# Patient Record
Sex: FEMALE | Race: WHITE | NOT HISPANIC OR LATINO | Employment: FULL TIME | URBAN - METROPOLITAN AREA
[De-identification: names, ages, dates, MRNs, and addresses within clinical notes are randomized per-mention and may not be internally consistent; named-entity substitution may affect disease eponyms.]

---

## 2017-02-14 ENCOUNTER — CONVERSION ENCOUNTER (OUTPATIENT)
Dept: GENERAL RADIOLOGY | Facility: HOSPITAL | Age: 62
End: 2017-02-14

## 2018-06-25 ENCOUNTER — OFFICE VISIT CONVERTED (OUTPATIENT)
Dept: INTERNAL MEDICINE | Facility: CLINIC | Age: 63
End: 2018-06-25
Attending: INTERNAL MEDICINE

## 2018-07-25 ENCOUNTER — OFFICE VISIT CONVERTED (OUTPATIENT)
Dept: INTERNAL MEDICINE | Facility: CLINIC | Age: 63
End: 2018-07-25
Attending: INTERNAL MEDICINE

## 2019-04-24 ENCOUNTER — HOSPITAL ENCOUNTER (OUTPATIENT)
Dept: URGENT CARE | Facility: CLINIC | Age: 64
Discharge: HOME | End: 2019-04-24
Attending: FAMILY MEDICINE

## 2019-05-30 ENCOUNTER — OFFICE VISIT CONVERTED (OUTPATIENT)
Dept: INTERNAL MEDICINE | Facility: CLINIC | Age: 64
End: 2019-05-30
Attending: INTERNAL MEDICINE

## 2019-06-03 ENCOUNTER — HOSPITAL ENCOUNTER (OUTPATIENT)
Dept: GENERAL RADIOLOGY | Facility: HOSPITAL | Age: 64
Discharge: HOME | End: 2019-06-03
Attending: INTERNAL MEDICINE

## 2019-06-03 LAB
CREAT BLD-MCNC: 1 MG/DL (ref 0.6–1.4)
GFR SERPLBLD BASED ON 1.73 SQ M-ARVRAT: >60 ML/MIN/{1.73_M2}

## 2019-07-16 ENCOUNTER — HOSPITAL ENCOUNTER (OUTPATIENT)
Dept: OTHER | Facility: HOSPITAL | Age: 64
Discharge: HOME | End: 2019-07-16
Attending: INTERNAL MEDICINE

## 2019-07-16 ENCOUNTER — OFFICE VISIT CONVERTED (OUTPATIENT)
Dept: INTERNAL MEDICINE | Facility: CLINIC | Age: 64
End: 2019-07-16
Attending: INTERNAL MEDICINE

## 2019-07-16 ENCOUNTER — CONVERSION ENCOUNTER (OUTPATIENT)
Dept: INTERNAL MEDICINE | Facility: CLINIC | Age: 64
End: 2019-07-16

## 2019-07-16 ENCOUNTER — CONVERSION ENCOUNTER (OUTPATIENT)
Dept: GASTROENTEROLOGY | Facility: CLINIC | Age: 64
End: 2019-07-16

## 2019-07-16 ENCOUNTER — OFFICE VISIT CONVERTED (OUTPATIENT)
Dept: GASTROENTEROLOGY | Facility: CLINIC | Age: 64
End: 2019-07-16
Attending: INTERNAL MEDICINE

## 2019-07-16 LAB
ALBUMIN SERPL-MCNC: 3.9 G/DL (ref 3.5–5)
ALBUMIN/GLOB SERPL: 1.1 {RATIO} (ref 1.4–2.6)
ALP SERPL-CCNC: 81 U/L (ref 43–160)
ALT SERPL-CCNC: 19 U/L (ref 10–40)
ANION GAP SERPL CALC-SCNC: 19 MMOL/L (ref 8–19)
AST SERPL-CCNC: 23 U/L (ref 15–50)
BASOPHILS # BLD AUTO: 0.03 10*3/UL (ref 0–0.2)
BASOPHILS NFR BLD AUTO: 0.5 % (ref 0–3)
BILIRUB SERPL-MCNC: 0.15 MG/DL (ref 0.2–1.3)
BUN SERPL-MCNC: 20 MG/DL (ref 5–25)
BUN/CREAT SERPL: 24 {RATIO} (ref 6–20)
CALCIUM SERPL-MCNC: 9.9 MG/DL (ref 8.7–10.4)
CHLORIDE SERPL-SCNC: 100 MMOL/L (ref 99–111)
CHOLEST SERPL-MCNC: 131 MG/DL (ref 107–200)
CHOLEST/HDLC SERPL: 2.8 {RATIO} (ref 3–6)
CONV ABS IMM GRAN: 0.03 10*3/UL (ref 0–0.2)
CONV CO2: 26 MMOL/L (ref 22–32)
CONV IMMATURE GRAN: 0.5 % (ref 0–1.8)
CONV TOTAL PROTEIN: 7.5 G/DL (ref 6.3–8.2)
CREAT UR-MCNC: 0.84 MG/DL (ref 0.5–0.9)
DEPRECATED RDW RBC AUTO: 49.4 FL (ref 36.4–46.3)
EOSINOPHIL # BLD AUTO: 0.21 10*3/UL (ref 0–0.7)
EOSINOPHIL # BLD AUTO: 3.5 % (ref 0–7)
ERYTHROCYTE [DISTWIDTH] IN BLOOD BY AUTOMATED COUNT: 15.8 % (ref 11.7–14.4)
GFR SERPLBLD BASED ON 1.73 SQ M-ARVRAT: >60 ML/MIN/{1.73_M2}
GLOBULIN UR ELPH-MCNC: 3.6 G/DL (ref 2–3.5)
GLUCOSE SERPL-MCNC: 63 MG/DL (ref 65–99)
HBA1C MFR BLD: 11.5 G/DL (ref 12–16)
HCT VFR BLD AUTO: 37.2 % (ref 37–47)
HDLC SERPL-MCNC: 47 MG/DL (ref 40–60)
LDLC SERPL CALC-MCNC: 55 MG/DL (ref 70–100)
LYMPHOCYTES # BLD AUTO: 2.43 10*3/UL (ref 1–5)
MCH RBC QN AUTO: 26.3 PG (ref 27–31)
MCHC RBC AUTO-ENTMCNC: 30.9 G/DL (ref 33–37)
MCV RBC AUTO: 85.1 FL (ref 81–99)
MONOCYTES # BLD AUTO: 0.5 10*3/UL (ref 0.2–1.2)
MONOCYTES NFR BLD AUTO: 8.2 % (ref 3–10)
NEUTROPHILS # BLD AUTO: 2.88 10*3/UL (ref 2–8)
NEUTROPHILS NFR BLD AUTO: 47.3 % (ref 30–85)
NRBC CBCN: 0 % (ref 0–0.7)
OSMOLALITY SERPL CALC.SUM OF ELEC: 291 MOSM/KG (ref 273–304)
PLATELET # BLD AUTO: 684 10*3/UL (ref 130–400)
PMV BLD AUTO: 9.3 FL (ref 9.4–12.3)
POTASSIUM SERPL-SCNC: 4.6 MMOL/L (ref 3.5–5.3)
RBC # BLD AUTO: 4.37 10*6/UL (ref 4.2–5.4)
SODIUM SERPL-SCNC: 140 MMOL/L (ref 135–147)
TRIGL SERPL-MCNC: 144 MG/DL (ref 40–150)
VARIANT LYMPHS NFR BLD MANUAL: 40 % (ref 20–45)
VLDLC SERPL-MCNC: 29 MG/DL (ref 5–37)
WBC # BLD AUTO: 6.08 10*3/UL (ref 4.8–10.8)

## 2019-09-03 ENCOUNTER — HOSPITAL ENCOUNTER (OUTPATIENT)
Dept: GENERAL RADIOLOGY | Facility: HOSPITAL | Age: 64
Discharge: HOME | End: 2019-09-03
Attending: INTERNAL MEDICINE

## 2019-09-09 ENCOUNTER — HOSPITAL ENCOUNTER (OUTPATIENT)
Dept: GASTROENTEROLOGY | Facility: HOSPITAL | Age: 64
Setting detail: HOSPITAL OUTPATIENT SURGERY
Discharge: HOME | End: 2019-09-09
Attending: INTERNAL MEDICINE

## 2019-09-30 ENCOUNTER — HOSPITAL ENCOUNTER (OUTPATIENT)
Dept: GENERAL RADIOLOGY | Facility: HOSPITAL | Age: 64
Discharge: HOME | End: 2019-09-30
Attending: INTERNAL MEDICINE

## 2020-03-08 ENCOUNTER — HOSPITAL ENCOUNTER (OUTPATIENT)
Dept: URGENT CARE | Facility: CLINIC | Age: 65
Discharge: HOME | End: 2020-03-08

## 2020-03-19 ENCOUNTER — OFFICE VISIT CONVERTED (OUTPATIENT)
Dept: INTERNAL MEDICINE | Facility: CLINIC | Age: 65
End: 2020-03-19
Attending: INTERNAL MEDICINE

## 2020-03-19 ENCOUNTER — HOSPITAL ENCOUNTER (OUTPATIENT)
Dept: OTHER | Facility: HOSPITAL | Age: 65
Discharge: HOME | End: 2020-03-19
Attending: INTERNAL MEDICINE

## 2020-03-19 LAB
ALBUMIN SERPL-MCNC: 3.7 G/DL (ref 3.5–5)
ALBUMIN/GLOB SERPL: 1 {RATIO} (ref 1.4–2.6)
ALP SERPL-CCNC: 75 U/L (ref 43–160)
ALT SERPL-CCNC: 14 U/L (ref 10–40)
ANION GAP SERPL CALC-SCNC: 20 MMOL/L (ref 8–19)
AST SERPL-CCNC: 21 U/L (ref 15–50)
BASOPHILS # BLD AUTO: 0.04 10*3/UL (ref 0–0.2)
BASOPHILS NFR BLD AUTO: 0.3 % (ref 0–3)
BILIRUB SERPL-MCNC: 0.24 MG/DL (ref 0.2–1.3)
BUN SERPL-MCNC: 11 MG/DL (ref 5–25)
BUN/CREAT SERPL: 15 {RATIO} (ref 6–20)
CALCIUM SERPL-MCNC: 10 MG/DL (ref 8.7–10.4)
CHLORIDE SERPL-SCNC: 100 MMOL/L (ref 99–111)
CONV ABS IMM GRAN: 0.07 10*3/UL (ref 0–0.2)
CONV CO2: 22 MMOL/L (ref 22–32)
CONV IMMATURE GRAN: 0.6 % (ref 0–1.8)
CONV TOTAL PROTEIN: 7.5 G/DL (ref 6.3–8.2)
CREAT UR-MCNC: 0.71 MG/DL (ref 0.5–0.9)
DEPRECATED RDW RBC AUTO: 45.7 FL (ref 36.4–46.3)
EOSINOPHIL # BLD AUTO: 0.08 10*3/UL (ref 0–0.7)
EOSINOPHIL # BLD AUTO: 0.6 % (ref 0–7)
ERYTHROCYTE [DISTWIDTH] IN BLOOD BY AUTOMATED COUNT: 14.6 % (ref 11.7–14.4)
GFR SERPLBLD BASED ON 1.73 SQ M-ARVRAT: >60 ML/MIN/{1.73_M2}
GLOBULIN UR ELPH-MCNC: 3.8 G/DL (ref 2–3.5)
GLUCOSE SERPL-MCNC: 83 MG/DL (ref 65–99)
HCT VFR BLD AUTO: 35.1 % (ref 37–47)
HGB BLD-MCNC: 11 G/DL (ref 12–16)
LYMPHOCYTES # BLD AUTO: 1.46 10*3/UL (ref 1–5)
LYMPHOCYTES NFR BLD AUTO: 11.6 % (ref 20–45)
MCH RBC QN AUTO: 27 PG (ref 27–31)
MCHC RBC AUTO-ENTMCNC: 31.3 G/DL (ref 33–37)
MCV RBC AUTO: 86 FL (ref 81–99)
MONOCYTES # BLD AUTO: 0.62 10*3/UL (ref 0.2–1.2)
MONOCYTES NFR BLD AUTO: 4.9 % (ref 3–10)
NEUTROPHILS # BLD AUTO: 10.27 10*3/UL (ref 2–8)
NEUTROPHILS NFR BLD AUTO: 82 % (ref 30–85)
NRBC CBCN: 0 % (ref 0–0.7)
OSMOLALITY SERPL CALC.SUM OF ELEC: 285 MOSM/KG (ref 273–304)
PLATELET # BLD AUTO: 671 10*3/UL (ref 130–400)
PMV BLD AUTO: 9.7 FL (ref 9.4–12.3)
POTASSIUM SERPL-SCNC: 3.9 MMOL/L (ref 3.5–5.3)
RBC # BLD AUTO: 4.08 10*6/UL (ref 4.2–5.4)
SODIUM SERPL-SCNC: 138 MMOL/L (ref 135–147)
T4 FREE SERPL-MCNC: 1.4 NG/DL (ref 0.9–1.8)
TSH SERPL-ACNC: 2.04 M[IU]/L (ref 0.27–4.2)
WBC # BLD AUTO: 12.54 10*3/UL (ref 4.8–10.8)

## 2020-03-30 ENCOUNTER — TELEMEDICINE CONVERTED (OUTPATIENT)
Dept: INTERNAL MEDICINE | Facility: CLINIC | Age: 65
End: 2020-03-30
Attending: PHYSICIAN ASSISTANT

## 2020-03-30 ENCOUNTER — HOSPITAL ENCOUNTER (OUTPATIENT)
Dept: OTHER | Facility: HOSPITAL | Age: 65
Discharge: HOME | End: 2020-03-30
Attending: PHYSICIAN ASSISTANT

## 2020-03-30 LAB
APPEARANCE UR: ABNORMAL
BILIRUB UR QL: NEGATIVE
COLOR UR: YELLOW
CONV BACTERIA: NEGATIVE
CONV COLLECTION SOURCE (UA): ABNORMAL
CONV CRYSTALS: ABNORMAL /[HPF]
CONV HYALINE CASTS IN URINE MICRO: ABNORMAL /[LPF]
CONV UROBILINOGEN IN URINE BY AUTOMATED TEST STRIP: 0.2 {EHRLICHU}/DL (ref 0.1–1)
GLUCOSE UR QL: NEGATIVE MG/DL
HGB UR QL STRIP: ABNORMAL
KETONES UR QL STRIP: NEGATIVE MG/DL
LEUKOCYTE ESTERASE UR QL STRIP: ABNORMAL
NITRITE UR QL STRIP: NEGATIVE
PH UR STRIP.AUTO: 5 [PH] (ref 5–8)
PROT UR QL: ABNORMAL MG/DL
RBC #/AREA URNS HPF: ABNORMAL /[HPF]
SP GR UR: 1.02 (ref 1–1.03)
SQUAMOUS SPT QL MICRO: ABNORMAL /[HPF]
WBC #/AREA URNS HPF: ABNORMAL /[HPF]

## 2020-04-01 ENCOUNTER — HOSPITAL ENCOUNTER (OUTPATIENT)
Dept: GENERAL RADIOLOGY | Facility: HOSPITAL | Age: 65
Discharge: HOME | End: 2020-04-01
Attending: INTERNAL MEDICINE

## 2020-04-01 LAB
BACTERIA UR CULT: NORMAL
CREAT BLD-MCNC: 1.1 MG/DL (ref 0.6–1.4)
GFR SERPLBLD BASED ON 1.73 SQ M-ARVRAT: 54 ML/MIN/{1.73_M2}

## 2020-04-08 ENCOUNTER — OFFICE VISIT CONVERTED (OUTPATIENT)
Dept: UROLOGY | Facility: CLINIC | Age: 65
End: 2020-04-08
Attending: SURGERY

## 2020-04-17 ENCOUNTER — HOSPITAL ENCOUNTER (OUTPATIENT)
Dept: GASTROENTEROLOGY | Facility: HOSPITAL | Age: 65
Setting detail: HOSPITAL OUTPATIENT SURGERY
Discharge: HOME | End: 2020-04-17
Attending: INTERNAL MEDICINE

## 2020-04-20 ENCOUNTER — HOSPITAL ENCOUNTER (OUTPATIENT)
Dept: OTHER | Facility: HOSPITAL | Age: 65
Discharge: HOME | End: 2020-04-20
Attending: INTERNAL MEDICINE

## 2020-04-20 ENCOUNTER — TELEMEDICINE CONVERTED (OUTPATIENT)
Dept: INTERNAL MEDICINE | Facility: CLINIC | Age: 65
End: 2020-04-20
Attending: INTERNAL MEDICINE

## 2020-04-22 ENCOUNTER — OFFICE VISIT CONVERTED (OUTPATIENT)
Dept: SURGERY | Facility: CLINIC | Age: 65
End: 2020-04-22
Attending: SURGERY

## 2020-05-15 ENCOUNTER — TELEMEDICINE CONVERTED (OUTPATIENT)
Dept: INTERNAL MEDICINE | Facility: CLINIC | Age: 65
End: 2020-05-15
Attending: INTERNAL MEDICINE

## 2020-05-20 ENCOUNTER — OFFICE VISIT CONVERTED (OUTPATIENT)
Dept: SURGERY | Facility: CLINIC | Age: 65
End: 2020-05-20
Attending: SURGERY

## 2020-05-20 ENCOUNTER — CONVERSION ENCOUNTER (OUTPATIENT)
Dept: SURGERY | Facility: CLINIC | Age: 65
End: 2020-05-20

## 2020-06-03 ENCOUNTER — OFFICE VISIT CONVERTED (OUTPATIENT)
Dept: SURGERY | Facility: CLINIC | Age: 65
End: 2020-06-03
Attending: SURGERY

## 2020-06-17 ENCOUNTER — OFFICE VISIT CONVERTED (OUTPATIENT)
Dept: SURGERY | Facility: CLINIC | Age: 65
End: 2020-06-17
Attending: SURGERY

## 2020-06-18 ENCOUNTER — CONVERSION ENCOUNTER (OUTPATIENT)
Dept: INTERNAL MEDICINE | Facility: CLINIC | Age: 65
End: 2020-06-18

## 2020-06-18 ENCOUNTER — OFFICE VISIT CONVERTED (OUTPATIENT)
Dept: INTERNAL MEDICINE | Facility: CLINIC | Age: 65
End: 2020-06-18
Attending: NURSE PRACTITIONER

## 2020-06-18 ENCOUNTER — HOSPITAL ENCOUNTER (OUTPATIENT)
Dept: OTHER | Facility: HOSPITAL | Age: 65
Discharge: HOME | End: 2020-06-18
Attending: NURSE PRACTITIONER

## 2020-07-14 ENCOUNTER — OFFICE VISIT CONVERTED (OUTPATIENT)
Dept: NEUROSURGERY | Facility: CLINIC | Age: 65
End: 2020-07-14
Attending: PHYSICIAN ASSISTANT

## 2020-08-04 ENCOUNTER — HOSPITAL ENCOUNTER (OUTPATIENT)
Dept: PREADMISSION TESTING | Facility: HOSPITAL | Age: 65
Discharge: HOME | End: 2020-08-04
Attending: NEUROLOGICAL SURGERY

## 2020-08-05 LAB — SARS-COV-2 RNA SPEC QL NAA+PROBE: NOT DETECTED

## 2020-08-07 ENCOUNTER — HOSPITAL ENCOUNTER (OUTPATIENT)
Dept: PERIOP | Facility: HOSPITAL | Age: 65
Setting detail: HOSPITAL OUTPATIENT SURGERY
Discharge: HOME | End: 2020-08-07
Attending: NEUROLOGICAL SURGERY

## 2020-08-07 LAB
ANION GAP SERPL CALC-SCNC: 18 MMOL/L (ref 8–19)
BUN SERPL-MCNC: 33 MG/DL (ref 5–25)
BUN/CREAT SERPL: 39 {RATIO} (ref 6–20)
CALCIUM SERPL-MCNC: 10.5 MG/DL (ref 8.7–10.4)
CHLORIDE SERPL-SCNC: 99 MMOL/L (ref 99–111)
CONV CO2: 26 MMOL/L (ref 22–32)
CREAT UR-MCNC: 0.85 MG/DL (ref 0.5–0.9)
GFR SERPLBLD BASED ON 1.73 SQ M-ARVRAT: >60 ML/MIN/{1.73_M2}
GLUCOSE SERPL-MCNC: 93 MG/DL (ref 65–99)
OSMOLALITY SERPL CALC.SUM OF ELEC: 295 MOSM/KG (ref 273–304)
POTASSIUM SERPL-SCNC: 3.8 MMOL/L (ref 3.5–5.3)
SODIUM SERPL-SCNC: 139 MMOL/L (ref 135–147)

## 2020-08-27 ENCOUNTER — OFFICE VISIT CONVERTED (OUTPATIENT)
Dept: NEUROSURGERY | Facility: CLINIC | Age: 65
End: 2020-08-27
Attending: PHYSICIAN ASSISTANT

## 2020-09-22 ENCOUNTER — HOSPITAL ENCOUNTER (OUTPATIENT)
Dept: OTHER | Facility: HOSPITAL | Age: 65
Setting detail: RECURRING SERIES
Discharge: HOME | End: 2021-01-05
Attending: NEUROLOGICAL SURGERY

## 2020-09-28 ENCOUNTER — OFFICE VISIT CONVERTED (OUTPATIENT)
Dept: INTERNAL MEDICINE | Facility: CLINIC | Age: 65
End: 2020-09-28
Attending: INTERNAL MEDICINE

## 2020-09-28 ENCOUNTER — CONVERSION ENCOUNTER (OUTPATIENT)
Dept: INTERNAL MEDICINE | Facility: CLINIC | Age: 65
End: 2020-09-28

## 2020-09-28 ENCOUNTER — HOSPITAL ENCOUNTER (OUTPATIENT)
Dept: OTHER | Facility: HOSPITAL | Age: 65
Discharge: HOME | End: 2020-09-28
Attending: INTERNAL MEDICINE

## 2020-09-28 LAB
ALBUMIN SERPL-MCNC: 4.3 G/DL (ref 3.5–5)
ALBUMIN/GLOB SERPL: 1.3 {RATIO} (ref 1.4–2.6)
ALP SERPL-CCNC: 87 U/L (ref 43–160)
ALT SERPL-CCNC: 19 U/L (ref 10–40)
ANION GAP SERPL CALC-SCNC: 19 MMOL/L (ref 8–19)
AST SERPL-CCNC: 22 U/L (ref 15–50)
BASOPHILS # BLD AUTO: 0.04 10*3/UL (ref 0–0.2)
BASOPHILS NFR BLD AUTO: 0.6 % (ref 0–3)
BILIRUB SERPL-MCNC: 0.29 MG/DL (ref 0.2–1.3)
BUN SERPL-MCNC: 25 MG/DL (ref 5–25)
BUN/CREAT SERPL: 28 {RATIO} (ref 6–20)
CALCIUM SERPL-MCNC: 9.8 MG/DL (ref 8.7–10.4)
CHLORIDE SERPL-SCNC: 100 MMOL/L (ref 99–111)
CHOLEST SERPL-MCNC: 163 MG/DL (ref 107–200)
CHOLEST/HDLC SERPL: 3 {RATIO} (ref 3–6)
CONV ABS IMM GRAN: 0.03 10*3/UL (ref 0–0.2)
CONV CO2: 25 MMOL/L (ref 22–32)
CONV IMMATURE GRAN: 0.4 % (ref 0–1.8)
CONV TOTAL PROTEIN: 7.5 G/DL (ref 6.3–8.2)
CREAT UR-MCNC: 0.9 MG/DL (ref 0.5–0.9)
DEPRECATED RDW RBC AUTO: 44.5 FL (ref 36.4–46.3)
EOSINOPHIL # BLD AUTO: 0.2 10*3/UL (ref 0–0.7)
EOSINOPHIL # BLD AUTO: 2.8 % (ref 0–7)
ERYTHROCYTE [DISTWIDTH] IN BLOOD BY AUTOMATED COUNT: 13.8 % (ref 11.7–14.4)
ERYTHROCYTE [SEDIMENTATION RATE] IN BLOOD: 20 MM/H (ref 0–30)
GFR SERPLBLD BASED ON 1.73 SQ M-ARVRAT: >60 ML/MIN/{1.73_M2}
GLOBULIN UR ELPH-MCNC: 3.2 G/DL (ref 2–3.5)
GLUCOSE SERPL-MCNC: 86 MG/DL (ref 65–99)
HCT VFR BLD AUTO: 39.7 % (ref 37–47)
HDLC SERPL-MCNC: 55 MG/DL (ref 40–60)
HGB BLD-MCNC: 12.3 G/DL (ref 12–16)
LDLC SERPL CALC-MCNC: 76 MG/DL (ref 70–100)
LYMPHOCYTES # BLD AUTO: 2.22 10*3/UL (ref 1–5)
LYMPHOCYTES NFR BLD AUTO: 31.4 % (ref 20–45)
MCH RBC QN AUTO: 27.5 PG (ref 27–31)
MCHC RBC AUTO-ENTMCNC: 31 G/DL (ref 33–37)
MCV RBC AUTO: 88.6 FL (ref 81–99)
MONOCYTES # BLD AUTO: 0.45 10*3/UL (ref 0.2–1.2)
MONOCYTES NFR BLD AUTO: 6.4 % (ref 3–10)
NEUTROPHILS # BLD AUTO: 4.14 10*3/UL (ref 2–8)
NEUTROPHILS NFR BLD AUTO: 58.4 % (ref 30–85)
NRBC CBCN: 0 % (ref 0–0.7)
OSMOLALITY SERPL CALC.SUM OF ELEC: 294 MOSM/KG (ref 273–304)
PLATELET # BLD AUTO: 401 10*3/UL (ref 130–400)
PMV BLD AUTO: 9.8 FL (ref 9.4–12.3)
POTASSIUM SERPL-SCNC: 4.1 MMOL/L (ref 3.5–5.3)
RBC # BLD AUTO: 4.48 10*6/UL (ref 4.2–5.4)
SODIUM SERPL-SCNC: 140 MMOL/L (ref 135–147)
TRIGL SERPL-MCNC: 159 MG/DL (ref 40–150)
URATE SERPL-MCNC: 8.3 MG/DL (ref 2.5–7.5)
VLDLC SERPL-MCNC: 32 MG/DL (ref 5–37)
WBC # BLD AUTO: 7.08 10*3/UL (ref 4.8–10.8)

## 2020-11-09 ENCOUNTER — OFFICE VISIT CONVERTED (OUTPATIENT)
Dept: INTERNAL MEDICINE | Facility: CLINIC | Age: 65
End: 2020-11-09
Attending: INTERNAL MEDICINE

## 2020-11-09 ENCOUNTER — HOSPITAL ENCOUNTER (OUTPATIENT)
Dept: OTHER | Facility: HOSPITAL | Age: 65
Discharge: HOME | End: 2020-11-09
Attending: INTERNAL MEDICINE

## 2020-11-16 ENCOUNTER — HOSPITAL ENCOUNTER (OUTPATIENT)
Dept: GENERAL RADIOLOGY | Facility: HOSPITAL | Age: 65
Discharge: HOME | End: 2020-11-16
Attending: INTERNAL MEDICINE

## 2020-11-19 ENCOUNTER — HOSPITAL ENCOUNTER (OUTPATIENT)
Dept: GENERAL RADIOLOGY | Facility: HOSPITAL | Age: 65
Discharge: HOME | End: 2020-11-19
Attending: INTERNAL MEDICINE

## 2020-12-03 ENCOUNTER — OFFICE VISIT CONVERTED (OUTPATIENT)
Dept: SURGERY | Facility: CLINIC | Age: 65
End: 2020-12-03
Attending: SURGERY

## 2020-12-03 ENCOUNTER — CONVERSION ENCOUNTER (OUTPATIENT)
Dept: SURGERY | Facility: CLINIC | Age: 65
End: 2020-12-03

## 2020-12-04 ENCOUNTER — OFFICE VISIT CONVERTED (OUTPATIENT)
Dept: ORTHOPEDIC SURGERY | Facility: CLINIC | Age: 65
End: 2020-12-04
Attending: ORTHOPAEDIC SURGERY

## 2020-12-16 ENCOUNTER — HOSPITAL ENCOUNTER (OUTPATIENT)
Dept: PREADMISSION TESTING | Facility: HOSPITAL | Age: 65
Discharge: HOME | End: 2020-12-16
Attending: SURGERY

## 2020-12-17 LAB — SARS-COV-2 RNA SPEC QL NAA+PROBE: NOT DETECTED

## 2020-12-21 ENCOUNTER — HOSPITAL ENCOUNTER (OUTPATIENT)
Dept: PERIOP | Facility: HOSPITAL | Age: 65
Setting detail: HOSPITAL OUTPATIENT SURGERY
Discharge: HOME | End: 2020-12-22
Attending: SURGERY

## 2020-12-21 LAB
ANION GAP SERPL CALC-SCNC: 13 MMOL/L (ref 8–19)
BUN SERPL-MCNC: 27 MG/DL (ref 5–25)
BUN/CREAT SERPL: 30 {RATIO} (ref 6–20)
CALCIUM SERPL-MCNC: 9.8 MG/DL (ref 8.7–10.4)
CHLORIDE SERPL-SCNC: 100 MMOL/L (ref 99–111)
CONV CO2: 27 MMOL/L (ref 22–32)
CREAT UR-MCNC: 0.91 MG/DL (ref 0.5–0.9)
GFR SERPLBLD BASED ON 1.73 SQ M-ARVRAT: >60 ML/MIN/{1.73_M2}
GLUCOSE SERPL-MCNC: 77 MG/DL (ref 65–99)
OSMOLALITY SERPL CALC.SUM OF ELEC: 286 MOSM/KG (ref 273–304)
POTASSIUM SERPL-SCNC: 3.9 MMOL/L (ref 3.5–5.3)
SODIUM SERPL-SCNC: 136 MMOL/L (ref 135–147)

## 2021-01-06 ENCOUNTER — OFFICE VISIT CONVERTED (OUTPATIENT)
Dept: SURGERY | Facility: CLINIC | Age: 66
End: 2021-01-06
Attending: SURGERY

## 2021-01-19 ENCOUNTER — HOSPITAL ENCOUNTER (OUTPATIENT)
Dept: GENERAL RADIOLOGY | Facility: HOSPITAL | Age: 66
Discharge: HOME | End: 2021-01-19
Attending: INTERNAL MEDICINE

## 2021-05-10 NOTE — H&P
History and Physical      Patient Name: India Gaytan   Patient ID: [id number]   Sex: Female   YOB: 1955    Primary Care Provider: Lisa Jones MD    Visit Date: April 8, 2020    Provider: Eliseo Cannon MD   Location: [facility]   Location Address: [address] [place] [address]  [id number]   Location Phone: [phone]          Chief Complaint  · Follow Up      History Of Present Illness  India Gaytan is a 64 year old /White female who presents to the office today for evaluation after hospitalization for diverticulitis. The patient reports since she has been taking the antibiotics, she does feel better. Her abdominal pain is less. She is still having some back pain but she also had a fall after her admission. She was readmitted and evaluated for syncope. She is being evaluated by her medical doctors for this. She now reports that she is having bloody and brown tinged discharge from her vagina. Her PCP initially thought this was from her urine and she did have a blood on a urinalysis but, based on her description, this is most likely contaminant. She reports that when she places paper or wipe up near the vagina, she gets return of blood. She also has some brown tinged discharge from the vagina but she reports it is not foul smelling. She is not having any air from her vagina. She is having regular bowel movements since discharge from the hospital.       Past Medical History  Anemia; Essential hypertension; Gout; High cholesterol; Hypertension, essential; Lower extremity edema; Mixed hyperlipidemia; Osteoarthritis of right hip; Pain of right hip joint; Radiculopathy Right Lower Extremity; Seasonal allergies         Past Surgical History  Colonoscopy; Hysterectomy; Ovaries Removed; Rotator Cuff repair         Medication List  Benicar HCT 20-12.5 mg oral tablet; Cipro 500 mg oral tablet; colchicine 0.6 mg oral tablet; dicyclomine 20 mg oral tablet; fenofibrate 150 mg  "oral capsule; Flagyl 500 mg oral tablet; fluticasone propionate 50 mcg/actuation nasal spray,suspension; furosemide 20 mg oral tablet; levofloxacin 750 mg oral tablet; potassium chloride 10 mEq oral capsule, extended release; Probiotic Acidophilus 1.5 mg (250 million cell) oral capsule; Singulair 10 mg oral tablet; Suprep Bowel Prep Kit 17.5-3.13-1.6 gram oral recon soln; Wellbutrin  mg oral tablet sustained-release 12 hr         Allergy List  NO KNOWN DRUG ALLERGIES         Family Medical History  Heart Disease; Family history of heart disease         Reproductive History   4 Para 4 0 0 0       Social History  Alcohol (Current some day); lives alone; Tobacco (Never); ; Working         Immunizations  Name Date Admin   Tdap          Review of Systems  · Constitutional  o Denies  o : chills, fever  · Gastrointestinal  o Denies  o : nausea, vomiting      Vitals  Date Time BP Position Site L\R Cuff Size HR RR TEMP (F) WT  HT  BMI kg/m2 BSA m2 O2 Sat        2020 09:11 AM       16  224lbs 2oz 5'  7\" 35.1 2.19           Physical Examination  · Constitutional  o Appearance  o : well developed, well-nourished, alert and in no acute distress  · Head and Face  o Head  o :   § Inspection  § : no deformities or lesions  · Eyes  o Conjunctivae  o : clear  o Sclerae  o : clear  · Neck  o Inspection/Palpation  o : normal appearance, no masses or tenderness, trachea midline  · Respiratory  o Respiratory Effort  o : breathing unlabored  o Inspection of Chest  o : normal appearance, no retractions  · Cardiovascular  o Heart  o : regular rate and rhythm  · Gastrointestinal  o Abdominal Examination  o : abdomen is soft  · Lymphatic  o Neck  o : no lymphadenopathy present  o Axilla  o : no lymphadenopathy present  o Groin  o : no lymphadenopathy present  · Skin and Subcutaneous Tissue  o General Inspection  o : no rashes present, no lesions present, no areas of discoloration  · Neurologic  o Cranial " Nerves  o : grossly intact  o Sensation  o : grossly intact  o Gait and Station  o :   § Gait Screening  § : normal gait, able to stand without diffculty  o Cerebellar Function  o : no obvious abnormalities  · Psychiatric  o Judgement and Insight  o : judgment and insight intact  o Mood and Affect  o : mood normal, affect appropriate          Assessment     Patient with recent hospitalization for microperfed diverticulitis with abscess and recent evaluation for syncope, now with complaints of vaginal discharge.     Problems Reconciled  Plan  · Medications  o Medications have been Reconciled  o Transition of Care or Provider Policy  · Instructions  o Electronically Identified Patient Education Materials Provided Electronically     Given her findings and symptoms, I think we have to seriously consider the potential that she could have or be developing a colovaginal fistula secondary to the diverticulitis. She seems to have recovered from any active infection with the diverticulitis. I don't think she requires further antibiotics at this time. Given the findings of the discharge from the vagina, I do think it is reasonable to have her talk with a GYN. She may need a vaginal exam or imaging. She has had a coloscopy within the last year or so and she reports that was normal. I do think it is reasonable to repeat her colonoscopy or at least a sigmoidoscopy to evaluate this area and see if there is any obvious evidence of fistula. I did discuss with her if we do find evidence of fistula, an operation would likely be the only way to treat the fistula although they do sometimes resolve on their own but an operation is the most reasonable recommendation. First, however, we need to establish that there is indeed a fistula there. I did discuss with her that this can sometimes be difficult to completely establish. Given the COVID restrictions, I cannot perform her sigmoidoscopy so we will try and get her in touch with  gastroenterology as well as GYN to see if we can get her evaluated for the diverticulitis as well as the possible fistula. I discussed all of this with the patient. All questions were answered.  She voiced understanding and agreed to proceed with the above plan.             Electronically Signed by: Ofelia Joiner-, -Author on April 13, 2020 09:43:09 AM  Electronically Co-signed by: Eliseo Cannon MD -Reviewer on April 13, 2020 11:24:44 AM

## 2021-05-10 NOTE — H&P
History and Physical      Patient Name: India Gaytan   Patient ID: [id number]   Sex: Female   YOB: 1955    Primary Care Provider: Lisa Jones MD    Visit Date: July 14, 2020    Provider: Sanam Sanderson PA-C   Location: [facility]   Location Address: [address] [place] [address]  [id number]   Location Phone: [phone]          Chief Complaint  · Back pain      History Of Present Illness  The patient is a 64 year old /White female, who presents on referral from Lisa Jones MD, for a neurosurgical evaluation T11 compression fracture. She had two syncopal episodes in March and back has been hurting since that time. Some pain around to the ribs and leg cramps worse at night.   She denies bowel or bladder dysfunction. The patient has no prior history of neck or back surgery.   RECENT INTERVENTIONS:  She has been previously treated with NSAIDs.   INFORMATION REVIEWED:  The following information was reviewed: radiology reports and images. MRI of the thoracic spine and from June 2020 at [facility] (loaded to PACS) revealed T11 compression fracture without retropulsion. Edema seen in the T11 vertebral body.      She had abdominal surgery in May 2020 and is not sure she would tolerate wearing a TLSO brace.       Past Medical History  Anemia; Essential hypertension; Gout; High cholesterol; Hyperlipidemia; Hypertension, Benign Essential; Hypertension, essential; Lower extremity edema; Mixed hyperlipidemia; Osteoarthritis of right hip; Pain of right hip joint; Radiculopathy Right Lower Extremity; Seasonal allergies         Past Surgical History  Colonoscopy; Hysterectomy; Ovaries Removed; Rotator Cuff repair         Medication List  cyclobenzaprine 7.5 mg oral tablet; fenofibrate 150 mg oral capsule; fluticasone propionate 50 mcg/actuation nasal spray,suspension; furosemide 20 mg oral tablet; olmesartan-hydrochlorothiazide 20-12.5 mg oral tablet; potassium  "chloride 10 mEq oral capsule, extended release; Singulair 10 mg oral tablet; TLSO Brace         Allergy List  levofloxacin       Allergies Reconciled  Family Medical History  Heart Disease; Family history of heart disease         Reproductive History   4 Para 4 0 0 0       Social History  Alcohol (Current some day); lives alone; Tobacco (Never); ; Working         Immunizations  Name Date Admin   Tdap          Review of Systems  · Constitutional  o Admits  o : sycope/passing out  o Denies  o : chills, excessive sweating, fatigue, fever, weight gain, weight loss  · Eyes  o Denies  o : changes in vision, blurry vision, double vision  · HENT  o Denies  o : loss of hearing, ringing in the ears, ear aches, sore throat, nasal congestion, sinus pain, nose bleeds, seasonal allergies  · Cardiovascular  o Denies  o : blood clots, swollen legs, anemia, easy burising or bleeding, transfusions  · Respiratory  o Denies  o : shortness of breath, dry cough, productive cough, pneumonia, COPD  · Gastrointestinal  o Denies  o : difficulty swallowing, reflux  · Genitourinary  o Denies  o : incontinence  · Neurologic  o Denies  o : headache, seizure, stroke, tremor, loss of balance, falls, dizziness/vertigo, difficulty with sleep, numbness/tingling/paresthesia , difficulty with coordination, difficulty with dexterity, weakness  · Musculoskeletal  o Admits  o : pain radiating in leg, low back pain, mid back pain  o Denies  o : neck stiffness/pain, swollen lymph nodes, muscle aches, joint pain, weakness, spasms, sciatica, pain radiating in arm  · Endocrine  o Denies  o : diabetes, thyroid disorder  · Psychiatric  o Denies  o : anxiety, depression  · All Others Negative      Vitals  Date Time BP Position Site L\R Cuff Size HR RR TEMP (F) WT  HT  BMI kg/m2 BSA m2 O2 Sat        2020 02:25 PM        97.3 228lbs 1oz 5'  7\" 35.72 2.21           Physical Examination  · Constitutional  o Appearance  o : well-nourished, well " developed, alert, in no acute distress  · Respiratory  o Respiratory Effort  o : breathing unlabored  · Cardiovascular  o Peripheral Vascular System  o :   § Extremities  § : no edema or cyanosis  · Musculoskeletal  o Spine  o :   § Inspection/Palpation  § : tenderness to palpation present in the midline around the T11 region  o Right Lower Extremity  o :   § Inspection/Palpation  § : no joint or limb tenderness to palpation, no edema present, no ecchymosis  § Joint Stability  § : joint stability within normal limits  § Range of Motion  § : range of motion normal, no joint crepitations present, no pain on motion, Yuan's test negative  o Left Lower Extremity  o :   § Inspection/Palpation  § : no joint or limb tenderness to palpation, no edema present, no ecchymosis  § Joint Stability  § : joint stability within normal limits  § Range of Motion  § : range of motion normal, no joint crepitations present, no pain on motion, Yuan's test negative  · Skin and Subcutaneous Tissue  o Extremities  o :   § Right Lower Extremity  § : no lesions or areas of discoloration  § Left Lower Extremity  § : no lesions or areas of discoloration  o Back  o : no lesions or areas of discoloration  · Neurologic  o Mental Status Examination  o :   § Orientation  § : alert and oriented to time, person, place and events  o Motor Examination  o :   § RLE Strength  § : strength normal  § RLE Motor Function  § : tone normal, no atrophy, no abnormal movements noted  § LLE Strength  § : strength normal  § LLE Motor Function  § : tone normal, no atrophy, no abnormal movements noted  o Reflexes  o :   § RLE  § : knee and ankle reflexes 2/4, SLR negative, no clonus  § LLE  § : knee and ankle reflexes 2/4, SLR negative, no clonus  o Sensation  o :   § Light Touch  § : sensation intact to light touch in extremities  o Gait and Station  o :   § Gait Screening  § : normal gait, able to stand without difficulty  · Psychiatric  o Mood and Affect  o :  mood normal, affect appropriate          Assessment  · Pre-op examination     V72.84/Z01.818  · Thoracic compression fracture     805.2/S22.000A  T11    Problems Reconciled  Plan  · Medications  o Medications have been Reconciled  o Transition of Care or Provider Policy  · Instructions  o ****Surgical Orders****  o Outpatient  o RISK AND BENEFITS:  o Possible risks/complications, benefits and alternatives to surgical or invasive procedure have been explained to the patient and/or legal guardian.  o Patient has been evaluated and can tolerate anesthesia and/or sedation. Risks, benefits, and alternatives to anesthesia and sedation have been explained to the patient and/or legal guardian.  o ***************  o PREP: Per protocol;  o IV: Per Anesthesia;  o *******************************************  o PRE- OP MEDICATION ORDER:  o *******************************************  o Kefzol 2 grams IV on call to OR.  o Date of Surgical Procedure:   o Please sign permit for: thoracic eleven vertebroplasty  o The above History and Physical must have been completed within 30 days of admission.  o Encouraged to follow-up with Primary Care Provider for preventative care.  o The ROS and the PFSH were reviewed at today's visit.  o Call or return to office if symptoms worsen or persist.  o Dr. Cid and myself discussed with her the option of vertebroplasty at T11. She would like to proceed with surgery. She is taking NSAIDS and having blood in urine. She is a  so would like to avoid narcotics.             Electronically Signed by: SYBIL Barnett-TENZIN -Author on July 14, 2020 03:50:40 PM  Electronically Co-signed by: Hung Cid MD -Reviewer on July 14, 2020 04:42:04 PM

## 2021-05-10 NOTE — H&P
History and Physical      Patient Name: India Gaytan   Patient ID: [id number]   Sex: Female   YOB: 1955    Primary Care Provider: Lisa Jones MD   Referring Provider: Lisa Jones MD    Visit Date: December 3, 2020    Provider: Eliseo Cannon MD   Location: [facility]   Location Address: [address] [place] [address]  [id number]   Location Phone: [phone]          Chief Complaint  · Hernia Consult      History Of Present Illness  India Gaytan is a 65 year old /White female who presents to the office today as a consult from Lisa Jones MD. She presents today for evaluation of an incisional hernia. I am familiar with Ms. 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 She had done well after the colovaginal fistula but she did have a postoperative wound infection. I believe that likely led to an incisional hernia. She was seen in Dr. Jones's office. A CT scan was obtained, which showed a hernia just above the umbilicus, which I believe is an incisional hernia. She has the sensation of bulging around the incision site. It is occasionally painful. She is not having any obstructive type symptoms. No overlying skin changes.       Past Medical History  Anemia; Essential hypertension; Gout; High cholesterol; Hyperlipidemia; Hypertension, Benign Essential; Hypertension, essential; Lower extremity edema; Mixed hyperlipidemia; Osteoarthritis of right hip; Pain of right hip joint; Radiculopathy Right Lower Extremity; Seasonal allergies         Past Surgical History  Colonoscopy; Hysterectomy; Ovaries Removed; Rotator Cuff repair; Vertebroplasty         Medication List  fluticasone propionate 50 mcg/actuation nasal spray,suspension; furosemide 20 mg oral tablet; olmesartan-hydrochlorothiazide 20-12.5 mg oral tablet; potassium chloride 10 mEq oral capsule, extended release; Singulair 10 mg oral tablet; Tricor 145 mg oral tablet  "        Allergy List  levofloxacin       Allergies Reconciled  Family Medical History  Heart Disease; Family history of heart disease         Reproductive History   4 Para 4 0 0 0       Social History  Alcohol (Current some day); lives alone; Tobacco (Never); ; Working         Immunizations  Name Date Admin   Tdap 2016         Review of Systems  · Gastrointestinal  o Denies  o : nausea, vomiting, diarrhea, constipation      Vitals  Date Time BP Position Site L\R Cuff Size HR RR TEMP (F) WT  HT  BMI kg/m2 BSA m2 O2 Sat FR L/min FiO2        2020 10:06 AM         238lbs 8oz 5'  7\" 37.35 2.26             Physical Examination  · Constitutional  o Appearance  o : well developed, well-nourished, alert and in no acute distress  · Head and Face  o Head  o :   § Inspection  § : no deformities or lesions  · Eyes  o Conjunctivae  o : clear  o Sclerae  o : clear  · Neck  o Inspection/Palpation  o : normal appearance, no masses or tenderness, trachea midline  · Respiratory  o Respiratory Effort  o : breathing unlabored  o Inspection of Chest  o : normal appearance, no retractions  · Cardiovascular  o Heart  o : regular rate and rhythm  · Gastrointestinal  o Abdominal Examination  o : abdomen is soft. She does seem to have a hernia in the upper portion of her incision. It is reducible and mildly tender.   · Lymphatic  o Neck  o : no lymphadenopathy present  o Axilla  o : no lymphadenopathy present  o Groin  o : no lymphadenopathy present  · Skin and Subcutaneous Tissue  o General Inspection  o : no rashes present, no lesions present, no areas of discoloration  · Neurologic  o Cranial Nerves  o : grossly intact  o Sensation  o : grossly intact  o Gait and Station  o :   § Gait Screening  § : normal gait, able to stand without diffculty  o Cerebellar Function  o : no obvious abnormalities  · Psychiatric  o Judgement and Insight  o : judgment and insight intact  o Mood and Affect  o : mood normal, affect " appropriate              Assessment  · Pre-Surgical Orders     V72.84  · Hernia, Incisional     553.21/K43.2  · Pre-op testing     V72.84/Z01.818       Patient with incisional hernia.       Plan  · Orders  o General Surgery Order (GENOR) - 553.21/K43.2 - 12/21/2020  o [facility] [place]Op Covid-19 Screening (61473) - V72.84/Z01.818 - 12/16/2020 12/16/20 @11:45am. [address]  · Medications  o Medications have been Reconciled  o Transition of Care or Provider Policy  · Instructions  o PLAN:   o Handouts Provided-Pre-Procedure Instructions including date and time and location of procedure.  o Surgical Facility: [facility]  o ****Patient Status****  o Outpatient  o ********************  o RISK AND BENEFITS:  o Consent for surgery: Given these options, the patient has verbally expressed an understanding of the risks of surgery and finds these risks acceptable. We will proceed with surgery as soon as possible.  o Consult Anesthesia for any post-operative block, or any pain management procedure deemed necessary by the anestesiologist for adequate post-operative pain control.   o O.R. PREP: Per protocol  o IV: Per Anesthesia  o IV: LR@ 75ml/hr  o PLEASE SIGN PERMIT FOR: Robotic Incisional Hernia Repair with possible mesh  o *__Kefzol 2 gram IV on call to OR.  o *___The above History and Physical Examination has been completed within 30 days of admission.  o Pre-Admission Testing Date: Phone Screen 12/16/20 @9:30am  o Electronically Identified Patient Education Materials Provided Electronically     I discussed options with the patient in regards to hernia repair including laparoscopic, open, and robotic hernia repairs. The pros and cons of all of the above were discussed extensively. I have recommended a robotic hernia repair, as I think I can get good primary closure as well as placement of a supplemental mesh in order to decrease the risk of recurrence. She has agreed to this plan. Risks, benefits, and  alternatives were discussed with the patient extensively. All questions were answered. The patient voiced understanding and agrees to proceed with the above plan.             Electronically Signed by: Ofelia Joiner-, -Author on December 4, 2020 09:06:03 AM  Electronically Co-signed by: Eliseo Cannon MD -Reviewer on December 5, 2020 12:19:57 PM

## 2021-05-10 NOTE — H&P
History and Physical      Patient Name: India Gaytan   Patient ID: [id number]   Sex: Female   YOB: 1955    Primary Care Provider: Lisa Jones MD   Referring Provider: Lisa Jones MD    Visit Date: December 4, 2020    Provider: Archana Tiwari MD   Location: [facility]   Location Address: [address] [place] [address]  [id number]   Location Phone: [phone]          Chief Complaint  · Left Knee Pain      History Of Present Illness  India Gaytan is a 65 year old /White female who presents today to [facility].      Patient presents today for an evaluation of left knee pain. Patient presents today with MRI results of her left knee. Patient was taking Levquin which caused her to call in the tub back in March. Ever since then she has been having left knee pain. She states she has had time where she has to assist picking up her knee because it felt heavy. The pain has improved some since then but she is still experiencing pain in her knee. Patient hasn't received any treatment for her left knee.       Past Medical History  Anemia; Essential hypertension; Gout; High cholesterol; Hyperlipidemia; Hypertension, Benign Essential; Hypertension, essential; Lower extremity edema; Mixed hyperlipidemia; Osteoarthritis of right hip; Pain of right hip joint; Radiculopathy Right Lower Extremity; Seasonal allergies         Past Surgical History  Colonoscopy; Hysterectomy; Ovaries Removed; Rotator Cuff repair; Vertebroplasty         Medication List  fluticasone propionate 50 mcg/actuation nasal spray,suspension; furosemide 20 mg oral tablet; olmesartan-hydrochlorothiazide 20-12.5 mg oral tablet; potassium chloride 10 mEq oral capsule, extended release; Singulair 10 mg oral tablet; Tricor 145 mg oral tablet; Voltaren 1 % topical gel         Allergy List  levofloxacin       Allergies Reconciled  Family Medical History  Heart Disease; Family history of heart disease  "        Reproductive History   4 Para 4 0 0 0       Social History  Alcohol (Current some day); lives alone; Tobacco (Never); ; Working         Immunizations  Name Date Admin   Tdap 2016         Review of Systems  · Constitutional  o Denies  o : fever, chills, weight loss  · Cardiovascular  o Denies  o : chest pain, shortness of breath  · Gastrointestinal  o Denies  o : liver disease, heartburn, nausea, blood in stools  · Genitourinary  o Denies  o : painful urination, blood in urine  · Integument  o Denies  o : rash, itching  · Neurologic  o Denies  o : headache, weakness, loss of consciousness  · Musculoskeletal  o Denies  o : painful, swollen joints  · Psychiatric  o Denies  o : drug/alcohol addiction, anxiety, depression      Vitals  Date Time BP Position Site L\R Cuff Size HR RR TEMP (F) WT  HT  BMI kg/m2 BSA m2 O2 Sat FR L/min FiO2        2020 10:10 AM         235lbs 0oz 5'  7\" 36.81 2.24             Physical Examination  · Constitutional  o Appearance  o : well developed, well-nourished, no obvious deformities present  · Head and Face  o Head  o :   § Inspection  § : normocephalic  o Face  o :   § Inspection  § : no facial lesions  · Eyes  o Conjunctivae  o : conjunctivae normal  o Sclerae  o : sclerae white  · Ears, Nose, Mouth and Throat  o Ears  o :   § External Ears  § : appearance within normal limits  § Hearing  § : intact  o Nose  o :   § External Nose  § : appearance normal  · Neck  o Inspection/Palpation  o : normal appearance  o Range of Motion  o : full range of motion  · Respiratory  o Respiratory Effort  o : breathing unlabored  o Inspection of Chest  o : normal appearance  o Auscultation of Lungs  o : no audible wheezing or rales  · Cardiovascular  o Heart  o : regular rate  · Gastrointestinal  o Abdominal Examination  o : soft and non-tender  · Skin and Subcutaneous Tissue  o General Inspection  o : intact, no rashes  · Psychiatric  o General  o : Alert and oriented " x3  o Judgement and Insight  o : judgment and insight intact  o Mood and Affect  o : mood normal, affect appropriate  · Left Knee  o Inspection  o : Sensation grossly intact. Neurovascular intact. Skin intact. Calf supple, non-tender. Dorsal Pedal Pulse 2+, posterior tibialis pulse 2+ . Tender medial joint line. Non-tender lateral joint line. No patellar tendon tenderness, no pain of MCL, no pain at LCL . Full weightbearing. Positive Apley's. Negative McMurrays. Negative Lachman. Mild swelling. No skin discoloration or atrophy. Full flexion and extension. Limping gait.   · Imaging  o Imaging  o : 11/20/20 MRI: 1. Fraying of the medial meniscus body and posterior horn inner free margin with a questionable small 3 mm posterior body meniscal flap flipped into the medial tibial gutter. 2. Moderate to advanced chondromalacia in the medial and patellofemoral compartments. 3. Small joint effusion with nonspecific anterior soft tissue edema and fluid.           Assessment  · Primary osteoarthritis of left knee     715.16/M17.12  · Left Knee MMT (medial meniscus tear)     836.0/S83.249A  · Left knee pain, unspecified chronicity     719.46/M25.562      Plan  · Medications  o Medications have been Reconciled  o Transition of Care or Provider Policy  · Instructions  o Dr. Tiwari saw and examined the patient and agrees with plan.   o MRI reviewed by Dr. Tiwari.  o Reviewed the patient's Past Medical, Social, and Family history as well as the ROS at today's visit, no changes.  o Call or return if worsening symptoms.  o Follow up in 6 weeks.  o This note was transcribed by Akosua Tse.   o Discussed diagnosis and treatment options with the patient. Discussed a scope vs conservative treatment. Patient has hernia surgery coming up on the 20th and wishes to try conservative treatments. Patient opted for PT            Electronically Signed by: Akosua Tse-, Other -Author on December 7, 2020 09:49:03  AM  Electronically Co-signed by: Archana Tiwari MD -Reviewer on December 8, 2020 08:00:14 AM

## 2021-05-10 NOTE — H&P
History and Physical      Patient Name: India Gaytan   Patient ID: [id number]   Sex: Female   YOB: 1955    Primary Care Provider: Lisa Jones MD    Visit Date: April 8, 2020    Provider: Mayela Massey MD   Location[facility]   Location Address: [address] [place] [address]  [id number]   Location Phone: [phone]          Chief Complaint  · [email or web address]      History Of Present Illness  The patient is a 64 year old /White female, who is a consultation from Eliseo Cannon MD and Lisa Jones MD, for the evaluation of microhematuria. The patient was found to have microhematuria on an urinalysis in 2020, this year.     She states the color of her urine has been grossly clear. The patient also reports vaginal bleeding or bleeding on the toilet paper after she wipes. It has been dark red and had some clots. She states she has never seen red urine in the toilet. She has microhematuria on urinalysis, however.. She denies frequency, urgency, and dysuria.     She reports she recently experienced trauma to the an episode of diverticulitis with a small contained ruptured diverticula with contained 1cm abscess. He was seen and treated for this at [facility] and is seeing Dr. Cannon today and Dr. Taylor tomorrow for follow up. The patient's past medical history is notable for hysterectomy and colon diverticuli and episodes of diverticulitis.     The patient has not been previously evaluated for hematuria.       Past Medical History  Anemia; Essential hypertension; Gout; High cholesterol; Hypertension, essential; Lower extremity edema; Mixed hyperlipidemia; Osteoarthritis of right hip; Pain of right hip joint; Radiculopathy Right Lower Extremity; Seasonal allergies         Past Surgical History  Colonoscopy; Hysterectomy; Ovaries Removed; Rotator Cuff repair         Medication List  Benicar HCT 20-12.5 mg oral tablet; Cipro 500 mg oral tablet; colchicine 0.6 mg oral tablet; dicyclomine  "20 mg oral tablet; fenofibrate 150 mg oral capsule; Flagyl 500 mg oral tablet; fluticasone propionate 50 mcg/actuation nasal spray,suspension; furosemide 20 mg oral tablet; levofloxacin 750 mg oral tablet; potassium chloride 10 mEq oral capsule, extended release; Probiotic Acidophilus 1.5 mg (250 million cell) oral capsule; Singulair 10 mg oral tablet; Wellbutrin  mg oral tablet sustained-release 12 hr         Allergy List  NO KNOWN DRUG ALLERGIES       Allergies Reconciled  Family Medical History  Heart Disease; Family history of heart disease         Reproductive History   4 Para 4 0 0 0       Social History  Alcohol (Current some day); lives alone; Tobacco (Never); ; Working         Immunizations  Name Date Admin   Tdap          Review of Systems  · Constitutional  o Denies  o : fever, chills  · Gastrointestinal  o Denies  o : nausea, vomiting      Vitals  Date Time BP Position Site L\R Cuff Size HR RR TEMP (F) WT  HT  BMI kg/m2 BSA m2 O2 Sat HC       2020 09:11 AM       16  224lbs 2oz 5'  7\" 35.1 2.19           Physical Examination  · Constitutional  o Appearance  o : Well nourished, well developed patient in no acute distress. Ambulating without difficulty.  · Head and Face  o Head  o :   § Inspection  § : atraumatic, normocephalic  o Face  o :   § Inspection  § : no facial lesions  · Eyes  o Sclerae  o : sclerae white  · Ears, Nose, Mouth and Throat  o Ears  o :   § External Ears  § : appearance within normal limits, no lesions present  o Nose  o :   § External Nose  § : appearance normal  · Neck  o Inspection/Palpation  o : normal appearance, no masses or tenderness, trachea midline  · Respiratory  o Respiratory Effort  o : breathing unlabored  · Gastrointestinal  o Abdominal Examination  o : abdomen nontender to palpation, tone normal without rigidity or guarding, no masses present, abdominal contour scaphoid  · Neurologic  o Mental Status Examination  o :   § Orientation  § : " grossly oriented to person, place and time  § Speech/Language  § : communication ability within normal limits  o Gait and Station  o : normal gait, able to stand without difficulty  · Psychiatric  o Judgement and Insight  o : judgment and insight intact, judgement for everyday activities and social situations within normal limits, insight intact  o Mood and Affect  o : mood normal, affect appropriate          Results  · In-Office Procedures  o Lab procedure  § Automated dipstick urinalysis with microscopy (72310)   § Color Ur: Yellow   § Clarity Ur: Slightly cloudy   § Glucose Ur Ql Strip: Negative   § Bilirub Ur Ql Strip: Negative   § Ketones Ur Ql Strip: Negative   § Sp Gr Ur Qn: 1.015   § Hgb Ur Ql Strip: Negative   § pH Ur-LsCnc: 5.5   § Prot Ur Ql Strip: Negative   § Urobilinogen Ur Strip-mCnc: 0.2 E.U./dL   § Nitrite Ur Ql Strip: Negative   § WBC Est Ur Ql Strip: Trace   § RBC UrnS Qn HPF: 5   § WBC UrnS Qn HPF: 0   § Bacteria UrnS Qn HPF: 0   § Crystals UrnS Qn HPF: 0   § Epithelial Cells (non renal): 0 /HPF  § Epithelial Cells (renal): 0       Assessment  · Microscopic hematuria     599.72/R31.29      Plan  · Orders  o Cystoscopy and bilateral retrograde pyelograms (91085) - 599.72/R31.29 - 06/08/2020  · Medications  o Medications have been Reconciled  o Transition of Care or Provider Policy  · Instructions  o DISCUSSION:  o We discussed that her gross blood when she wipes is likely vaginal more than coming from her bladder. However she has had trace blood in her urine on previous urinalysis. She is seeing Dr. Esthela Taylor tomorrow and will have a pelvic exam done then. I think this is likely to be more beneficial in terms of the source of her bleeding. However given that she is had microhematuria I do think it is prudent at some point to complete the work-up for hematuria. Obviously this may be deferred depending on what happens with her vaginal bleeding and/or diverticular abscess. We will plan on calling  her in a month or 2 to set up for cystoscopy and bilateral retrograde pyelograms at that time. She has had a CT scan which would did not reveal any kidney abnormalities.  o Schedule cystoscopy and bilateral retrograde pyelograms once were able to schedule procedures at the hospital.            Electronically Signed by: Mayela Massey MD -Author on April 8, 2020 12:19:34 PM

## 2021-05-10 NOTE — H&P
History and Physical      Patient Name: India Gaytan   Patient ID: [id number]   Sex: Female   YOB: 1955    Primary Care Provider: Lisa Jones MD    Visit Date: April 22, 2020    Provider: Eliseo Cannon MD   Location: [facility]   Location Address: [address] [place] [address]  [id number]   Location Phone: [phone]          Chief Complaint  · Outpatient History & Physical / Surgical Orders  · Colovaginal Fistula      History Of Present Illness  India Gaytan is a 64 year old /White female who recently underwent a colonoscopy by Dr. Rangel at . She had been having symptoms concerning for a colovaginal fistula secondary to diverticulitis. Dr. Rangel scoped her and didn't see any clear evidence of fistula but he did see a stricture in her sigmoid colon. She presents today for discussion of colectomy. Currently, her symptoms are slightly improved. She has been eating and drinking normally. She is having regular bowel movements but she is having some increased discharge from her vagina. She said over the course of the past week, it actually had gotten better but it is now slowly starting to worsen again. No fevers or chills. No nausea or vomiting.       Past Medical History  Anemia; Essential hypertension; Gout; High cholesterol; Hypertension, essential; Lower extremity edema; Mixed hyperlipidemia; Osteoarthritis of right hip; Pain of right hip joint; Radiculopathy Right Lower Extremity; Seasonal allergies         Past Surgical History  Colonoscopy; Hysterectomy; Ovaries Removed; Rotator Cuff repair         Medication List  Benicar HCT 20-12.5 mg oral tablet; fenofibrate 150 mg oral capsule; fluticasone propionate 50 mcg/actuation nasal spray,suspension; furosemide 20 mg oral tablet; metronidazole 500 mg oral tablet; neomycin 500 mg oral tablet; potassium chloride 10 mEq oral capsule, extended release; Singulair 10 mg oral tablet; Suprep Bowel Prep Kit  "17.5-3.13-1.6 gram oral recon soln         Allergy List  levofloxacin       Allergies Reconciled  Family Medical History  Heart Disease; Family history of heart disease         Reproductive History   4 Para 4 0 0 0       Social History  Alcohol (Current some day); lives alone; Tobacco (Never); ; Working         Immunizations  Name Date Admin   Tdap          Review of Systems  · Constitutional  o Denies  o : chills, fever  · Gastrointestinal  o Denies  o : nausea, vomiting      Vitals  Date Time BP Position Site L\R Cuff Size HR RR TEMP (F) WT  HT  BMI kg/m2 BSA m2 O2 Sat        2020 09:10 AM       12  222lbs 4oz 5'  7\" 34.81 2.18           Physical Examination  · Constitutional  o Appearance  o : well developed, well-nourished, alert and in no acute distress  · Head and Face  o Head  o :   § Inspection  § : no deformities or lesions  · Eyes  o Conjunctivae  o : clear  o Sclerae  o : clear  · Neck  o Inspection/Palpation  o : normal appearance, no masses or tenderness, trachea midline  · Respiratory  o Respiratory Effort  o : breathing unlabored  o Inspection of Chest  o : normal appearance, no retractions  · Cardiovascular  o Heart  o : regular rate and rhythm  · Gastrointestinal  o Abdominal Examination  o : abdomen is soft  · Lymphatic  o Neck  o : no lymphadenopathy present  o Axilla  o : no lymphadenopathy present  o Groin  o : no lymphadenopathy present  · Skin and Subcutaneous Tissue  o General Inspection  o : no rashes present, no lesions present, no areas of discoloration  · Neurologic  o Cranial Nerves  o : grossly intact  o Sensation  o : grossly intact  o Gait and Station  o :   § Gait Screening  § : normal gait, able to stand without diffculty  o Cerebellar Function  o : no obvious abnormalities  · Psychiatric  o Judgement and Insight  o : judgment and insight intact  o Mood and Affect  o : mood normal, affect " appropriate          Assessment  · Diverticulitis     562.11/K57.92  · Colovaginal fistula     619.1/N82.4       Patient with a likely colovaginal fistula, at the very least, a sigmoid stricture on colonoscopy.     Problems Reconciled  Plan  · Orders  o CMP (05202) - - 05/04/2020  o General Surgery Order (GENOR) - - 05/04/2020  · Medications  o neomycin 500 mg oral tablet   SIG: take 2 tab (1000 mg) at 1 P.M.; 2 tab (1000 mg) 2 PM and 2 tab (1000 mg) 11 PM the day before surgery   DISP: (6) tablets with 0 refills  Prescribed on 04/22/2020     o metronidazole 500 mg oral tablet   SIG: take 2 tab (1000 mg) 1 PM; 2 tab (1000 mg) 2pm; and 2 tab (1000mg) 11 PM-day before surgery   DISP: (6) tablets with 0 refills  Prescribed on 04/22/2020     o Medications have been Reconciled  o Transition of Care or Provider Policy  · Instructions  o PLAN: Sigmoid Colectomy and Takedown of Colovaginal Fistula   o ****Surgical Orders****  o Admit for INPATIENT services; Anticipated length of stay greater than two midnights  o RISK AND BENEFITS:  o Consent for surgery: Given these options, the patient has verbally expressed an understanding of the risks of surgery and finds these risks acceptable. We will proceed with surgery as soon as possible.  o Consult Anesthesia for any post-operative block, or any pain management procedure deemed necessary by the anestesiologist for adequate post-operative pain control.   o O.R. PREP: Per protocol  o PLEASE SIGN PERMIT FOR: Sigmoid Colectomy and Takedown of Colovaginal Fistula   o Entereg 12mg PO 30 min prior to PRE-OP.   o Cefazolin 2000mg IV x1 PRE-OP; Repeat Q4H during procedure AND Metronidazole 500 mg IV x 1 PRE-OP.   o *___The above History and Physical Examination has been completed within 30 days of admission.  o Pre-Admission Testing Date: April 29, 2020 @ 11am @ [facility]  o Evaluate for Pre-Op Epidural Placement  o Electronically Identified Patient Education Materials Provided  Electronically     I discussed with the patient I think it is quite likely she has a fistula. She has the potential to get ill from it. In addition to the stricture and episodes of diverticulitis, I think sigmoid colectomy is in her best interest. Risks and benefits of the procedure including bleeding, infection, injury to surrounding structures and leak as well as the possible need for a diverting ileostomy or colostomy were discussed with the patient extensively. All questions were answered. The patient voiced understanding and agrees to proceed with the above plan.             Electronically Signed by: Ofelia Joiner-, -Author on April 22, 2020 05:08:00 PM  Electronically Co-signed by: Eliseo Cannon MD -Reviewer on April 22, 2020 11:35:58 PM

## 2021-05-12 NOTE — PROGRESS NOTES
Quick Note      Patient Name: India Gaytan   Patient ID: [id number]   Sex: Female   YOB: 1955    Primary Care Provider: Lisa Jones MD    Visit Date: March 30, 2020    Provider: Suha Ferrell PA-C   Location: [facility]   Location Address: [address] [place] [address], [address]  [place], [place]  [id number]   Location Phone: [phone]          History Of Present Illness  TELEHEALTH VISIT  Chief Complaint: hematuria, constipation   India Gaytan is a 64 year old /White female who is presenting for evaluation via telehealth visit. Verbal consent obtained before beginning visit.   Provider spent 15 minutes with the patient during telehealth visit.   The following staff were present during this visit: Suha Ferrell PA-C   Past Medical History/Overview of Patient Symptoms     Patient was admitted 3/11/20 for diverticulitis with abscess and treated with IV antibiotics.  She was readmitted 3/25/20 for syncopal episodes likely secondary to Levaquin and dehydration.  She was switched to PO augmentin.  Patient states since that time she has had back pain, constipation and hematuria.  These symptoms have been present x 5 days.  She is having trouble walking due to the lower bilateral back pain.  It is constant.  She has also noticed light pink blood on the toilet paper when she urinates.  She has also had some urinary incontinence as well.  Patient has not had a bowel movement in 6 days.  She has taken a couple doses of miralax without any results.  No abdominal pain, no fever.     Patient does admit to direct exposure to COVID19 last week.  She has been self monitoring and quarantining.  She denies shortness of breath or fevers.    Admission records reviewed.           Assessment  · Hematuria     599.70/R31.9  · Constipation     564.00/K59.00  · Lumbago     724.2/M54.5  Will have patient walk in for KUB and u/a. Concern for UTI and possible bowel obstruction causing constipation.  Will likely treat for UTI and send urine for culture. Discussed with patient that I would have a low threshold for ordering a CT abd/pelvis but due to recent CT scans will hold off for now unless patient develops worsening symptoms, fevers or abd pain. Back pain likely secondary to fall, would expect this to improve in the next week. Continue pain medication/Tylenol/ibuprofen, make sure to drink plenty of water to stay hydrated. Patient will need to be evaluated in clinic if symptoms persist.    Problems Reconciled  Plan  · Orders  o Physician Telephone Evaluation, 11-20 minutes (50340) - 599.70/R31.9, 564.00/K59.00, 724.2/M54.5 - 03/30/2020  · Medications  o Bactrim -160 mg oral tablet   SIG: take 1 tablet by oral route every 12 hours for 5 days   DISP: (10) tablets with 0 refills  Prescribed on 03/30/2020     o Medications have been Reconciled  o Transition of Care or Provider Policy  · Instructions  o Plan Of Care:   o Chronic conditions reviewed and taken into consideration for today's treatment plan.  o Patient instructed to seek medical attention urgently for new or worsening symptoms.  o Patient was educated/instructed on their diagnosis, treatment and medications prior to discharge from the clinic today.  · Disposition  o Call or Return if symptoms worsen or persist.  o follow up as needed  o Discussed with Dr. Jones  o Medications sent electronically to pharmacy            Electronically Signed by: Suha Ferrell PA-C -Author on March 30, 2020 04:16:42 PM

## 2021-05-12 NOTE — PROGRESS NOTES
Progress Note      Patient Name: India Gaytan   Patient ID: [id number]   Sex: Female   YOB: 1955    Primary Care Provider: Lisa Jones MD    Visit Date: April 20, 2020    Provider: Lisa Jones MD   Location: [facility]   Location Address: [address] [place] [address], [address]  [place], [place]  [id number]   Location Phone: [phone]          Chief Complaint  · Left knee pain  · follow up      History Of Present Illness  Video Conferencing Visit  India Gaytan is a 64 year old /White female who is presenting for evaluation via video conferencing. Verbal consent obtained before beginning visit.   The following staff were present during this visit: Isabella Stack started call   India Gaytan is a 64 year old /White female who presents for evaluation and treatment of:      chronic issues.    Call done over facetime    states that she is doin gok right now  vaginal discharge has slightly improved    she isn't having abd pain  no fevers  finished abx about a week ago    reviewed note from urology agree with cytoscopy at a later date    she updated me on gyn visit where they told her they felt something firm       Past Medical History  Disease Name Date Onset Notes   Anemia --  --    Essential hypertension --  --    Gout 05/30/2019 greatly improved   High cholesterol --  --    Hypertension, essential --  --    Lower extremity edema 09/18/2017 cont compression stockings can do lymphedema therapy if she wishes in the future   Mixed hyperlipidemia --  --    Osteoarthritis of right hip 06/12/2015 --    Pain of right hip joint 09/18/2017 has chronic arthritis of that hip with tendonitis seen previously in MRI however she doesn't wish to do pt at this time, will try stretches that she has done in the past at home currently   Radiculopathy Right Lower Extremity 06/12/2015 --    Seasonal allergies --  --          Past Surgical History  Procedure Name Date Notes    Colonoscopy 2006 --    Hysterectomy  --    Ovaries Removed  --    Rotator Cuff repair  --          Medication List  Name Date Started Instructions   Benicar HCT 20-12.5 mg oral tablet 2020 take 1 tablet by oral route once daily   fenofibrate 150 mg oral capsule 2020 take 1 capsule (150 mg) by oral route once daily with food for 90 days   fluticasone propionate 50 mcg/actuation nasal spray,suspension 2019 spray 2 sprays (100 mcg) in each nostril by intranasal route once daily as needed for 30 days   furosemide 20 mg oral tablet 2020 take 1 tablet by oral route 2 times a day for 90 days   potassium chloride 10 mEq oral capsule, extended release 2019 take 1 capsule by oral route 2 times a day for 90 days   Singulair 10 mg oral tablet 2019 take 1 tablet (10 mg) by oral route once daily in the evening         Allergy List  Allergen Name Date Reaction Notes   levofloxacin --  --  --          Family Medical History  Disease Name Relative/Age Notes   Heart Disease Father/   --    Family history of heart disease Father/   Father         Reproductive History  Menstrual   Pregnancy Summary   Total Pregnancies: 4 Full Term: 4 Premature: 0   Ab Induced: 0 Ab Spontaneous: 0 Ectopics: 0   Multiples: 0 Livin         Social History  Finding Status Start/Stop Quantity Notes   Alcohol Current some day --/-- --  rarely drinks  social   lives alone --  --/-- --  --    Tobacco Never --/-- --  2017 -     --  --/-- --  --    Working --  --/-- --  --          Immunizations  NameDate Admin Mfg Trade Name Lot Number Route Inj VIS Given VIS Publication   Tdap2016 SKB Td K2D2T IM RD 2016   Comments: Patient tolerated well, left office in stable condition.         Review of Systems  · Constitutional  o Denies  o : fever, fatigue, weight loss, weight gain  · Cardiovascular  o Admits  o : lower extremity edema  o Denies  o : claudication, chest  pressure, palpitations  · Respiratory  o Denies  o : shortness of breath, wheezing, cough, hemoptysis, dyspnea on exertion  · Gastrointestinal  o Denies  o : nausea, vomiting, diarrhea, constipation, abdominal pain      Physical Examination     Gen: well-nourished, no acute distress  HENT: atraumatic, normocephalic  Eyes: extraocular movements intact, no scleral icterus  Lung: breathing comfortably, no cough  Skin: no visible rash, no lesions  Neuro: grossly oriented to person, place, and time. no facial droop   Psych: normal mood and affect    left leg does appear edematous  one small circular area of erythema anteriorly  most swelling is around knee rather than lower               Assessment  · Lower extremity edema     782.3/R60.0  · Knee pain, left     719.46/M25.562  · Diverticulitis     562.11/K57.92  · Colonic stricture     560.9/K56.699       will bring her in for knee xray  slightly worrisome for a blood clot  discussed this with her at [facility], as it is more around the knee and anterior rather than posterior and better today than yesterdya will hold off on VELE  however if symptoms worsen low threshold for venous evaluation  and or MRI as she did have trauma to the knee a few weeks ago    will call Dr. Cannon to see if we can get appt with him moved up a little    follow up with gyn and urology once gen surg addresses stricture    monitor very closely call back with any issues or concerns       Plan  · Orders  o ACO-39: Current medications updated and reviewed () - - 04/20/2020  o Knee (Left) 3 views X-Ray [facility] ([id number]) - 719.46/M25.562 - 04/20/2020  · Medications  o Medications have been Reconciled  o Transition of Care or Provider Policy  · Instructions  o Patient was educated/instructed on their diagnosis, treatment and medications prior to discharge from the clinic today.  o Electronically Identified Patient Education Materials Provided Electronically            Electronically  Signed by: Lisa Jones MD -Author on April 20, 2020 03:46:55 PM

## 2021-05-13 NOTE — PROGRESS NOTES
Progress Note      Patient Name: India Gaytan   Patient ID: [id number]   Sex: Female   YOB: 1955    Primary Care Provider: Lisa Jones MD    Visit Date: November 9, 2020    Provider: Lisa Jones MD   Location: [facility]   Location Address: [address] [place] [address], [address]  [place], [place]  [id number]   Location Phone: [phone]          Chief Complaint  · skin lesion removal left breast   · I want my flu, pneumonia and shingles shot today      History Of Present Illness  India Gaytan is a 65 year old /White female who presents for evaluation and treatment of:        Breast-reports skin lesion on left breast  would like lesion removed  it seems to be growing and darkening    GI-reports bulge in upper abdomen which is worse since her last diverticulitis flare  has an occasional stinging sensation, worse with lying back and sitting up       Past Medical History  Disease Name Date Onset Notes   Anemia --  --    Essential hypertension --  --    Gout 05/30/2019 greatly improved   High cholesterol --  --    Hyperlipidemia --  --    Hypertension, Benign Essential --  --    Hypertension, essential --  --    Lower extremity edema 09/18/2017 cont compression stockings can do lymphedema therapy if she wishes in the future   Mixed hyperlipidemia --  --    Osteoarthritis of right hip 06/12/2015 --    Pain of right hip joint 09/18/2017 has chronic arthritis of that hip with tendonitis seen previously in MRI however she doesn't wish to do pt at this time, will try stretches that she has done in the past at home currently   Radiculopathy Right Lower Extremity 06/12/2015 --    Seasonal allergies --  --          Past Surgical History  Procedure Name Date Notes   Colonoscopy 2006 2019 2020 --    Hysterectomy 1996 --    Ovaries Removed 1999 --    Rotator Cuff repair 2011 --    Vertebroplasty 8-7-20 T11         Medication List  Name Date Started Instructions   fluticasone  propionate 50 mcg/actuation nasal spray,suspension 2020 spray 2 sprays (100 mcg) in each nostril by intranasal route once daily as needed for 30 days   furosemide 20 mg oral tablet 2020 TAKE 1 TABLET BY MOUTH TWICE DAILY   olmesartan-hydrochlorothiazide 20-12.5 mg oral tablet 2020 TAKE 1 TABLET BY MOUTH EVERY DAY   potassium chloride 10 mEq oral capsule, extended release 2020 take 1 capsule by oral route 2 times a day for 90 days   Singulair 10 mg oral tablet 2020 take 1 tablet (10 mg) by oral route once daily in the evening   Tricor 145 mg oral tablet 2020 take 1 tablet (145 mg) by oral route once daily         Allergy List  Allergen Name Date Reaction Notes   levofloxacin --  --  --        Allergies Reconciled  Family Medical History  Disease Name Relative/Age Notes   Heart Disease Father/   --    Family history of heart disease Father/  Mother/   Mother; Father  Father         Reproductive History  Menstrual   Pregnancy Summary   Total Pregnancies: 4 Full Term: 4 Premature: 0   Ab Induced: 0 Ab Spontaneous: 0 Ectopics: 0   Multiples: 0 Livin         Social History  Finding Status Start/Stop Quantity Notes   Alcohol Current some day --/-- --  rarely drinks  rarely drinks  social   lives alone --  --/-- --  --    Tobacco Never --/-- --  never smoker  2017 -     --  --/-- --  --    Working --  --/-- --  --          Immunizations  NameDate Admin Mfg Trade Name Lot Number Route Inj VIS Given VIS Publication   Tdap2016 SKB Td K2D2T IM RD 2016   Comments: Patient tolerated well, left office in stable condition.         Review of Systems  · Constitutional  o Denies  o : fever, fatigue, weight loss, weight gain  · Breasts  o Admits  o : Other  · Cardiovascular  o Denies  o : lower extremity edema, claudication, chest pressure, palpitations  · Respiratory  o Denies  o : shortness of breath, wheezing, frequent cough, hemoptysis, dyspnea on  "exertion  · Gastrointestinal  o Admits  o : abdominal pain  o Denies  o : nausea, vomiting, diarrhea, constipation     reports skin lesion on left breast       Vitals  Date Time BP Position Site L\R Cuff Size HR RR TEMP (F) WT  HT  BMI kg/m2 BSA m2 O2 Sat FR L/min FiO2 HC       06/18/2020 03:00 /72 Sitting    55 - R  96.9 221lbs 4oz 5'  6\" 35.71 2.16 97 %  21%    09/28/2020 10:14 /78 Sitting    72 - R  97.1 232lbs 2oz 5'  7\" 36.36 2.23 97 %  21%    11/09/2020 10:55 /78 Sitting    61 - R 16 97.6 239lbs 4oz 5'  7\" 37.47 2.26 97 %  21%          Physical Examination  · Constitutional  o Appearance  o : no acute distress, well-nourished  · Head and Face  o Head  o :   § Inspection  § : atraumatic, normocephalic  · Eyes  o Eyes  o : extraocular movements intact, no scleral icterus, no conjunctival injection  · Respiratory  o Respiratory Effort  o : breathing comfortably, symmetric chest rise  o Auscultation of Lungs  o : clear to asculatation bilaterally, no wheezes, rales, or rhonchii  · Cardiovascular  o Heart  o :   § Auscultation of Heart  § : regular rate and rhythm, no murmurs, rubs, or gallops  o Peripheral Vascular System  o :   § Extremities  § : no edema  · Gastrointestinal  o Abdominal Examination  o :   § Abdomen  § : bulging in center, likely diastasis rectus  · Neurologic  o Mental Status Examination  o :   § Orientation  § : grossly oriented to person, place and time  o Gait and Station  o :   § Gait Screening  § : normal gait  · Psychiatric  o General  o : normal mood and affect     left breast with 1cm lesion  informred consent obtained  lesion injected with lidocaine about 2ml  removed by shave biopsy in two pieces, one large and the other small  minimal pinpoint bleeding               Assessment  · Screening for depression     V79.0/Z13.89  · Skin lesion of breast     611.9/N64.9  removed  will send for pathology  · Stomach pain     536.8/R10.9  will order ct abd as she does have " diverticulitis hx and has had several surgeries   differential is rectus diastasis, vs hernia at this point    Problems Reconciled  Plan  · Orders  o ACO-18: Negative screen for clinical depression using a standardized tool () - V79.0/Z13.89 - 11/09/2020  o ACO-39: Current medications updated and reviewed (, 1159F) - - 11/09/2020  o CT ABD&PELV with and without contrast (57372) - 536.8/R10.9 - 11/09/2020  o Shave biopsy of lesion of skin (31055) - 611.9/N64.9 - 11/09/2020   left breast  · Medications  o fluticasone propionate 50 mcg/actuation nasal spray,suspension   SIG: spray 2 sprays (100 mcg) in each nostril by intranasal route once daily as needed for 30 days   DISP: (1) Gram with 2 refills  Refilled on 11/09/2020     o furosemide 20 mg oral tablet   SIG: TAKE 1 TABLET BY MOUTH TWICE DAILY   DISP: (180) Tablet with 1 refills  Refilled on 11/09/2020     o olmesartan-hydrochlorothiazide 20-12.5 mg oral tablet   SIG: TAKE 1 TABLET BY MOUTH EVERY DAY   DISP: (90) Tablet with 1 refills  Refilled on 11/09/2020     o potassium chloride 10 mEq oral capsule, extended release   SIG: take 1 capsule by oral route 2 times a day for 90 days   DISP: (180) Capsule with 1 refills  Refilled on 11/09/2020     o Singulair 10 mg oral tablet   SIG: take 1 tablet (10 mg) by oral route once daily in the evening   DISP: (90) Tablet with 1 refills  Refilled on 11/09/2020     o Tricor 145 mg oral tablet   SIG: take 1 tablet (145 mg) by oral route once daily   DISP: (90) Tablet with 1 refills  Refilled on 11/09/2020     o indomethacin 50 mg oral capsule   SIG: take 1 capsule (50 mg) by oral route 3 times per day with food for 10 days   DISP: (30) Capsule with 0 refills  Discontinued on 11/09/2020     o Medications have been Reconciled  o Transition of Care or Provider Policy  · Instructions  o Depression Screen completed and scanned into the EMR under the designated folder within the patient's documents.  o Today's PHQ-9 result  is 0___  o Patient was educated/instructed on their diagnosis, treatment and medications prior to discharge from the clinic today.            Electronically Signed by: Lisa Jones MD -Author on November 9, 2020 02:30:15 PM

## 2021-05-13 NOTE — PROGRESS NOTES
Progress Note      Patient Name: India Gaytan   Patient ID: [id number]   Sex: Female   YOB: 1955    Primary Care Provider: Lisa Jones MD    Visit Date: 2020    Provider: Sanam Sanderson PA-C   Location: [facility]   Location Address: [address] [place] [address]  [id number]   Location Phone: [phone]          Chief Complaint  · Follow-up     2 week Post Op       History Of Present Illness  The patient is a 65 year old /White female who is in the office for followup appointment.      Here for first post op visit s/p T11 vertebroplasty.  Back pain has improved since surgery but back feels weak.       Past Medical History  Anemia; Essential hypertension; Gout; High cholesterol; Hyperlipidemia; Hypertension, Benign Essential; Hypertension, essential; Lower extremity edema; Mixed hyperlipidemia; Osteoarthritis of right hip; Pain of right hip joint; Radiculopathy Right Lower Extremity; Seasonal allergies         Past Surgical History  Colonoscopy; Hysterectomy; Ovaries Removed; Rotator Cuff repair; Vertebroplasty         Medication List  cyclobenzaprine 7.5 mg oral tablet; fluticasone propionate 50 mcg/actuation nasal spray,suspension; furosemide 20 mg oral tablet; olmesartan-hydrochlorothiazide 20-12.5 mg oral tablet; potassium chloride 10 mEq oral capsule, extended release; Singulair 10 mg oral tablet; TLSO Brace; Tricor 145 mg oral tablet         Allergy List  levofloxacin       Allergies Reconciled  Family Medical History  Heart Disease; Family history of heart disease         Reproductive History   4 Para 4 0 0 0       Social History  Alcohol (Current some day); lives alone; Tobacco (Never); ; Working         Immunizations  Name Date Admin   Tdap          Review of Systems  · Constitutional  o Denies  o : chills, excessive sweating, fatigue, fever, sycope/passing out, weight gain, weight loss  · Eyes  o Denies  o : changes in vision,  "blurry vision, double vision  · HENT  o Denies  o : loss of hearing, ringing in the ears, ear aches, sore throat, nasal congestion, sinus pain, nose bleeds, seasonal allergies  · Cardiovascular  o Denies  o : blood clots, swollen legs, anemia, easy burising or bleeding, transfusions  · Respiratory  o Denies  o : shortness of breath, dry cough, productive cough, pneumonia, COPD  · Gastrointestinal  o Denies  o : difficulty swallowing, reflux  · Genitourinary  o Denies  o : incontinence  · Neurologic  o Denies  o : headache, seizure, stroke, tremor, loss of balance, falls, dizziness/vertigo, difficulty with sleep, numbness/tingling/paresthesia , difficulty with coordination, difficulty with dexterity, weakness  · Musculoskeletal  o Admits  o : low back pain, mid back pain  o Denies  o : neck stiffness/pain, swollen lymph nodes, muscle aches, joint pain, weakness, spasms, sciatica, pain radiating in arm, pain radiating in leg  · Endocrine  o Denies  o : diabetes, thyroid disorder  · Psychiatric  o Denies  o : anxiety, depression  · All Others Negative      Vitals  Date Time BP Position Site L\R Cuff Size HR RR TEMP (F) WT  HT  BMI kg/m2 BSA m2 O2 Sat        08/27/2020 10:38 AM        97.5 229lbs 2oz 5'  7\" 35.89 2.22           Physical Examination  · Constitutional  o Appearance  o : well-nourished, well developed, alert, in no acute distress  · Respiratory  o Respiratory Effort  o : breathing unlabored  · Cardiovascular  o Peripheral Vascular System  o :   § Extremities  § : no cyanosis, clubbing or edema  · Neurologic  o Mental Status Examination  o :   § Orientation  § : grossly oriented to person, place and time  o Motor Examination  o :   § RLE Strength  § : strength normal  § RLE Motor Function  § : tone normal, no atrophy, no abnormal movements noted  § LLE Strength  § : strength normal  § LLE Motor Function  § : tone normal, no atrophy, no abnormal movements noted  o Reflexes  o :   § RLE  § : 2/4 knee and " ankle reflex  § LLE  § : 2/4 knee and ankle reflex  o Sensation  o :   § Light Touch  § : sensation intact to light touch in extremities  o Gait and Station  o :   § Gait Screening  § : gait within normal limits  · Psychiatric  o Mood and Affect  o : mood normal, affect appropriate     puncture wounds from procedure are healed in the lower thoracic region           Assessment  · Lumbago/low back pain     724.3/M54.40  · Thoracic compression fracture     805.2/S22.000A  T11 (now s/p vertebroplasty)    Problems Reconciled  Plan  · Orders  o PHYSICAL THERAPY CONSULTATION (PHYTH) - 805.2/S22.000A, 724.3/M54.40 - 08/27/2020   2 times a week x 4-6 weeks Evaluate and treat please focus on back strengthening She is s/p a T11 vertebroplasty  · Medications  o Medications have been Reconciled  o Transition of Care or Provider Policy  · Instructions  o The ROS and the PFSH were reviewed at today's visit.  o Call or return to office if symptoms worsen or persist.   o She is doing well s/p T11 vertebroplasty and I will order PT. F/U here as needed.             Electronically Signed by: Sanam Sanderson PA-C -Author on August 27, 2020 11:03:47 AM

## 2021-05-13 NOTE — PROGRESS NOTES
Progress Note      Patient Name: India Gaytan   Patient ID: [id number]   Sex: Female   YOB: 1955    Primary Care Provider: Lisa Jones MD    Visit Date: Mary 3, 2020    Provider: Eliseo Cannon MD   Location: [facility]   Location Address: [address] [place] [address]  [id number]   Location Phone: [phone]          Chief Complaint  · Follow Up Office Visit      History Of Present Illness  India Gaytan is a 64 year old /White female who presents today for a postoperative visit. 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 The patient reports she continues to do well. She is not reporting any abdominal pain. No nausea or vomiting. She is eating and drinking normally and having regular bowel movements. She is not having any drainage from her vagina. She continues to have an open wound in the lower portion of her abdomen that is getting packed and receiving local wound care but she doesn't report any signs of infection or other issues.       Past Medical History  Anemia; Essential hypertension; Gout; High cholesterol; Hypertension, essential; Lower extremity edema; Mixed hyperlipidemia; Osteoarthritis of right hip; Pain of right hip joint; Radiculopathy Right Lower Extremity; Seasonal allergies         Past Surgical History  Colonoscopy; Hysterectomy; Ovaries Removed; Rotator Cuff repair         Medication List  Benicar HCT 20-12.5 mg oral tablet; fenofibrate 150 mg oral capsule; fluticasone propionate 50 mcg/actuation nasal spray,suspension; furosemide 20 mg oral tablet; hydrocodone-acetaminophen 5-325 mg oral tablet; potassium chloride 10 mEq oral capsule, extended release; Singulair 10 mg oral tablet         Allergy List  levofloxacin       Allergies Reconciled  Family Medical History  Heart Disease; Family history of heart disease         Reproductive History   4 Para 4 0 0 0       Social History  Alcohol (Current some  "day); lives alone; Tobacco (Never); ; Working         Review of Systems  · Cardiovascular  o Denies  o : chest pain on exertion, shortness of breath, lower extremity swelling  · Respiratory  o Denies  o : shortness of breath, coughing up blood  · Gastrointestinal  o Denies  o : chronic abdominal pain      Vitals  Date Time BP Position Site L\R Cuff Size HR RR TEMP (F) WT  HT  BMI kg/m2 BSA m2 O2 Sat        06/03/2020 10:33 AM       12  216lbs 8oz 5'  6.5\" 34.42 2.15           Physical Examination  · Constitutional  o Appearance  o : well developed, well-nourished, alert and in no acute distress  · Head and Face  o Head  o :   § Inspection  § : no deformities or lesions  · Eyes  o Conjunctivae  o : clear  o Sclerae  o : nonicteric  · Neck  o Inspection/Palpation  o : normal appearance, no masses or tenderness, trachea midline  · Respiratory  o Respiratory Effort  o : breathing unlabored  o Inspection of Chest  o : normal appearance, no retractions  · Cardiovascular  o Heart  o : regular rate and rhythm  · Gastrointestinal  o Abdominal Examination  o :   § Abdomen  § : abdomen is soft. Incisions appear to be healing well. She still has a small cavity in the lower portion of the wound. The upper portion of the wound has a little bit of fibrinous material around the incision but it is very shallow. No signs of infection at either site.  · Lymphatic  o Neck  o : no lymphadenopathy present  o Axilla  o : no lymphadenopathy present  o Groin  o : no lymphadenopathy present  · Skin and Subcutaneous Tissue  o General Inspection  o : no rashes present, no lesions present, no areas of discoloration  · Neurologic  o Cranial Nerves  o : grossly intact  o Sensation  o : grossly intact  o Gait and Station  o :   § Gait Screening  § : normal gait, able to stand without diffculty  o Cerebellar Function  o : no obvious abnormalities  · Psychiatric  o Judgement and Insight  o : judgment and insight intact  o Mood and " Affect  o : mood normal, affect appropriate          Assessment  · Examination following surgery     V67.00/Z09       Patient status post resection of a colovaginal fistula with a midline wound.     Problems Reconciled  Plan  · Medications  o Medications have been Reconciled  o Transition of Care or Provider Policy     I think it is okay for the patient to stop packing her wound. I did address a couple of areas on the wounds with silver nitrate today. I will follow-up with her again in two weeks for reassessment but overall she looks great and is doing great. I think her wound should continue to improve and I don't think it requires further packing .  I discussed all of this with the patient. All questions were answered.  She voiced understanding and agreed to proceed with the above plan.             Electronically Signed by: Ofelia Joiner-, -Author on June 4, 2020 09:37:40 AM  Electronically Co-signed by: Eliseo Cannon MD -Reviewer on June 4, 2020 10:19:28 AM

## 2021-05-13 NOTE — PROGRESS NOTES
"   Progress Note      Patient Name: India Gaytan   Patient ID: [id number]   Sex: Female   YOB: 1955    Primary Care Provider: Lisa Jones MD    Visit Date: May 15, 2020    Provider: Lisa Jones MD   Location: [facility]   Location Address: [address] [place] [address], [address]  [place], [place]  [id number]   Location Phone: [phone]          Chief Complaint  · \"im doing a follow up from the hospital\"      History Of Present Illness  Video Conferencing Visit  India Gaytan is a 64 year old /White female who is presenting for evaluation via video conferencing. Verbal consent obtained before beginning visit.   The following staff were present during this visit: Lisa Jones MD   India Gaytan is a 64 year old /White female who presents for evaluation and treatment of:      done by zoom    doing well post op  states that her abdomen feels a lot better  no chest pain  no trouble breathing  is having some leg swelling, but the lasix seems to be helping with this    starting to get up and walk  having good bowel movements    bp running well  around 120's       Past Medical History  Disease Name Date Onset Notes   Anemia --  --    Essential hypertension --  --    Gout 05/30/2019 greatly improved   High cholesterol --  --    Hypertension, essential --  --    Lower extremity edema 09/18/2017 cont compression stockings can do lymphedema therapy if she wishes in the future   Mixed hyperlipidemia --  --    Osteoarthritis of right hip 06/12/2015 --    Pain of right hip joint 09/18/2017 has chronic arthritis of that hip with tendonitis seen previously in MRI however she doesn't wish to do pt at this time, will try stretches that she has done in the past at home currently   Radiculopathy Right Lower Extremity 06/12/2015 --    Seasonal allergies --  --          Past Surgical History  Procedure Name Date Notes   Colonoscopy 2006 2019 2020 --    Hysterectomy 1996 --  "   Ovaries Removed  --    Rotator Cuff repair  --          Medication List  Name Date Started Instructions   Benicar HCT 20-12.5 mg oral tablet 2020 take 1 tablet by oral route once daily   fenofibrate 150 mg oral capsule 2020 take 1 capsule (150 mg) by oral route once daily with food for 90 days   fluticasone propionate 50 mcg/actuation nasal spray,suspension 2019 spray 2 sprays (100 mcg) in each nostril by intranasal route once daily as needed for 30 days   furosemide 20 mg oral tablet 2020 take 1 tablet by oral route 2 times a day for 90 days   hydrocodone-acetaminophen 5-325 mg oral tablet  take 1-2 tablets by oral route every 4 hours as needed for pain   potassium chloride 10 mEq oral capsule, extended release 2019 take 1 capsule by oral route 2 times a day for 90 days   Singulair 10 mg oral tablet 2019 take 1 tablet (10 mg) by oral route once daily in the evening         Allergy List  Allergen Name Date Reaction Notes   levofloxacin --  --  --          Family Medical History  Disease Name Relative/Age Notes   Heart Disease Father/   --    Family history of heart disease Father/   Father         Reproductive History  Menstrual   Pregnancy Summary   Total Pregnancies: 4 Full Term: 4 Premature: 0   Ab Induced: 0 Ab Spontaneous: 0 Ectopics: 0   Multiples: 0 Livin         Social History  Finding Status Start/Stop Quantity Notes   Alcohol Current some day --/-- --  rarely drinks  social   lives alone --  --/-- --  --    Tobacco Never --/-- --  2017 -     --  --/-- --  --    Working --  --/-- --  --          Immunizations  NameDate Admin Mfg Trade Name Lot Number Route Inj VIS Given VIS Publication   Tdap2016 SKB Td K2D2T IM RD 2016   Comments: Patient tolerated well, left office in stable condition.         Review of Systems  · Constitutional  o Denies  o : fever, fatigue, weight loss, weight gain  · Cardiovascular  o Denies  o :  lower extremity edema, claudication, chest pressure, palpitations  · Respiratory  o Denies  o : shortness of breath, wheezing, cough, hemoptysis, dyspnea on exertion  · Gastrointestinal  o Denies  o : nausea, vomiting, diarrhea, constipation, abdominal pain      Physical Examination     Gen: well-nourished, no acute distress  HENT: atraumatic, normocephalic  Eyes: extraocular movements intact, no scleral icterus  Lung: breathing comfortably, no cough  Skin: no visible rash, no lesions  Neuro: grossly oriented to person, place, and time. no facial droop   Psych: normal mood and affect    abd with bandages, they have some serosanginous fluid, but overall appears to be healing well           Assessment  · Essential hypertension     401.9/I10  monitor closely, she will let me know if it is running high or low so we can adjust meds  · Lower extremity edema     782.3/R60.0  cont lasix prn with K+  · Colovaginal fistula     619.1/N82.4  doin much better  appears to be healin well    Problems Reconciled  Plan  · Orders  o ACO-39: Current medications updated and reviewed () - - 05/15/2020  · Medications  o Medications have been Reconciled  o Transition of Care or Provider Policy  · Instructions  o Patient was educated/instructed on their diagnosis, treatment and medications prior to discharge from the clinic today.  · Disposition  o 1 Month Follow Up            Electronically Signed by: Lisa Jones MD -Author on May 15, 2020 01:47:46 PM

## 2021-05-13 NOTE — PROGRESS NOTES
Progress Note      Patient Name: India Gaytan   Patient ID: [id number]   Sex: Female   YOB: 1955    Primary Care Provider: Lisa Jones MD    Visit Date: June 17, 2020    Provider: Eliseo Cannon MD   Location[facility]   Location Address: [address] [place] [address]  [id number]   Location Phone: [phone]          Chief Complaint  · Follow Up Office Visit S/P Surgery      History Of Present Illness  India Gaytan is a 64 year old /White female who presents today for a postoperative visit. She follows-up status post colectomy and takedown of a colovaginal fistula. The patient has been following up with me pretty consistently every couple of weeks and continues to do well. The last time I saw her, we removed all of her packing and I told her she didn't have to repack her wound anymore. She reports since then, her wounds have really begun to close. She has a very small amount of drainage at the wound near the umbilicus but otherwise everything is looking pretty good. She is eating and drinking normally and having regular bowel movements with no vaginal discharge.       Past Medical History  Anemia; Essential hypertension; Gout; High cholesterol; Hypertension, essential; Lower extremity edema; Mixed hyperlipidemia; Osteoarthritis of right hip; Pain of right hip joint; Radiculopathy Right Lower Extremity; Seasonal allergies         Past Surgical History  Colonoscopy; Hysterectomy; Ovaries Removed; Rotator Cuff repair         Medication List  Benicar HCT 20-12.5 mg oral tablet; fenofibrate 150 mg oral capsule; fluticasone propionate 50 mcg/actuation nasal spray,suspension; furosemide 20 mg oral tablet; hydrocodone-acetaminophen 5-325 mg oral tablet; olmesartan-hydrochlorothiazide 20-12.5 mg oral tablet; potassium chloride 10 mEq oral capsule, extended release; Singulair 10 mg oral tablet         Allergy List  levofloxacin       Allergies Reconciled  Family Medical  "History  Heart Disease; Family history of heart disease         Reproductive History   4 Para 4 0 0 0       Social History  Alcohol (Current some day); lives alone; Tobacco (Never); ; Working         Review of Systems  · Cardiovascular  o Denies  o : chest pain on exertion, shortness of breath, lower extremity swelling  · Respiratory  o Denies  o : shortness of breath, coughing up blood  · Gastrointestinal  o Denies  o : chronic abdominal pain      Vitals  Date Time BP Position Site L\R Cuff Size HR RR TEMP (F) WT  HT  BMI kg/m2 BSA m2 O2 Sat        2020 10:32 AM       14  216lbs 0oz 5'  6.5\" 34.34 2.14           Physical Examination  · Constitutional  o Appearance  o : well developed, well-nourished, alert and in no acute distress  · Head and Face  o Head  o :   § Inspection  § : no deformities or lesions  · Eyes  o Conjunctivae  o : clear  o Sclerae  o : nonicteric  · Neck  o Inspection/Palpation  o : normal appearance, no masses or tenderness, trachea midline  · Respiratory  o Respiratory Effort  o : breathing unlabored  o Inspection of Chest  o : normal appearance, no retractions  · Cardiovascular  o Heart  o : regular rate and rhythm  · Gastrointestinal  o Abdominal Examination  o :   § Abdomen  § : abdomen is soft. Incisions appear to be healing well. She does have a very minor amount of drainage from the upper portion of her open wound, which is closing.   · Lymphatic  o Neck  o : no lymphadenopathy present  o Axilla  o : no lymphadenopathy present  o Groin  o : no lymphadenopathy present  · Skin and Subcutaneous Tissue  o General Inspection  o : no rashes present, no lesions present, no areas of discoloration  · Neurologic  o Cranial Nerves  o : grossly intact  o Sensation  o : grossly intact  o Gait and Station  o :   § Gait Screening  § : normal gait, able to stand without diffculty  o Cerebellar Function  o : no obvious abnormalities  · Psychiatric  o Judgement and Insight  o : " judgment and insight intact  o Mood and Affect  o : mood normal, affect appropriate          Assessment  · Encounter for examination following surgery     V67.00/Z09       Patient status post colectomy for a colovaginal fistula.     Problems Reconciled  Plan  · Medications  o Medications have been Reconciled  o Transition of Care or Provider Policy     The patient is doing well and healing as expected. I am pleased with her overall progress. At this point in time, she doesn't require any further follow-up and she can follow-up with me as needed. We will send a reminder for repeat colonoscopy in about 18 months. She should call me if there area any concerning signs or issues sooner. I discussed all of this with the patient. All questions were answered.  She voiced understanding and agreed to proceed with the above plan.             Electronically Signed by: Ofelia Joiner-, -Author on June 17, 2020 01:55:00 PM  Electronically Co-signed by: Eliseo Cannon MD -Reviewer on June 17, 2020 05:46:11 PM

## 2021-05-13 NOTE — PROGRESS NOTES
"   Progress Note      Patient Name: India Gaytan   Patient ID: [id number]   Sex: Female   YOB: 1955    Primary Care Provider: Lisa Jones MD    Visit Date: September 28, 2020    Provider: Lisa Jones MD   Location: [facility]   Location Address: [address] [place] [address], [address]  [place], [place]  [id number]   Location Phone: [phone]          Chief Complaint  · \"Both of my knees hurt\"  · \"I have 2 spots on my left breat\"      History Of Present Illness  India Gaytan is a 65 year old /White female who presents for evaluation and treatment of:      chronic issues    Knee pain-has chronic bilateral knee pain  right knee has \"burning sensation\" that is new  had left knee injury in March 20 no improvement  takes ibuprofen for pain with on/off relief  left knee xray in spring showed arthritis    Breast-reports brown spot and knot on left breast, denies itching or pain  due for mammo    HTN-taking medication as prescribed  denies dizziness, chest pain  reports history of leg swelling unchanged    lesions between breasts, has been there a few weeks, in area of prior sebaceous cyst       Past Medical History  Disease Name Date Onset Notes   Anemia --  --    Essential hypertension --  --    Gout 05/30/2019 greatly improved   High cholesterol --  --    Hyperlipidemia --  --    Hypertension, Benign Essential --  --    Hypertension, essential --  --    Lower extremity edema 09/18/2017 cont compression stockings can do lymphedema therapy if she wishes in the future   Mixed hyperlipidemia --  --    Osteoarthritis of right hip 06/12/2015 --    Pain of right hip joint 09/18/2017 has chronic arthritis of that hip with tendonitis seen previously in MRI however she doesn't wish to do pt at this time, will try stretches that she has done in the past at home currently   Radiculopathy Right Lower Extremity 06/12/2015 --    Seasonal allergies --  --          Past Surgical " History  Procedure Name Date Notes   Colonoscopy 2006 --    Hysterectomy  --    Ovaries Removed  --    Rotator Cuff repair  --    Vertebroplasty 8-7-20 T11         Medication List  Name Date Started Instructions   fluticasone propionate 50 mcg/actuation nasal spray,suspension 2019 spray 2 sprays (100 mcg) in each nostril by intranasal route once daily as needed for 30 days   furosemide 20 mg oral tablet 2020 TAKE 1 TABLET BY MOUTH TWICE DAILY   olmesartan-hydrochlorothiazide 20-12.5 mg oral tablet 2020 TAKE 1 TABLET BY MOUTH EVERY DAY   potassium chloride 10 mEq oral capsule, extended release 2020 take 1 capsule by oral route 2 times a day for 90 days   Singulair 10 mg oral tablet 2019 take 1 tablet (10 mg) by oral route once daily in the evening   Tricor 145 mg oral tablet 2020 take 1 tablet (145 mg) by oral route once daily         Allergy List  Allergen Name Date Reaction Notes   levofloxacin --  --  --          Family Medical History  Disease Name Relative/Age Notes   Heart Disease Father/   --    Family history of heart disease Father/  Mother/   Mother; Father  Father         Reproductive History  Menstrual   Pregnancy Summary   Total Pregnancies: 4 Full Term: 4 Premature: 0   Ab Induced: 0 Ab Spontaneous: 0 Ectopics: 0   Multiples: 0 Livin         Social History  Finding Status Start/Stop Quantity Notes   Alcohol Current some day --/-- --  rarely drinks  rarely drinks  social   lives alone --  --/-- --  --    Tobacco Never --/-- --  never smoker  2017 -     --  --/-- --  --    Working --  --/-- --  --          Immunizations  NameDate Admin Mfg Trade Name Lot Number Route Inj VIS Given VIS Publication   Tdap2016 SKB Td K2D2T IM RD 2016   Comments: Patient tolerated well, left office in stable condition.         Review of Systems  · Constitutional  o Denies  o : fever, fatigue, weight loss, weight  "gain  · Cardiovascular  o Admits  o : lower extremity edema  o Denies  o : claudication, chest pressure, palpitations  · Respiratory  o Denies  o : shortness of breath, wheezing, frequent cough, hemoptysis, dyspnea on exertion  · Gastrointestinal  o Denies  o : nausea, vomiting, diarrhea, constipation, abdominal pain      Vitals  Date Time BP Position Site L\R Cuff Size HR RR TEMP (F) WT  HT  BMI kg/m2 BSA m2 O2 Sat HC       07/14/2020 02:25 PM        97.3 228lbs 1oz 5'  7\" 35.72 2.21     08/27/2020 10:38 AM        97.5 229lbs 2oz 5'  7\" 35.89 2.22     09/28/2020 10:14 /78 Sitting    72 - R  97.1 232lbs 2oz 5'  7\" 36.36 2.23 97 %          Physical Examination  · Constitutional  o Appearance  o : no acute distress, well-nourished  · Head and Face  o Head  o :   § Inspection  § : atraumatic, normocephalic  · Eyes  o Eyes  o : extraocular movements intact, no scleral icterus, no conjunctival injection  · Ears, Nose, Mouth and Throat  o Ears  o :   § External Ears  § : normal  o Nose  o :   § Intranasal Exam  § : nares patent  o Oral Cavity  o :   § Oral Mucosa  § : moist mucous membranes  · Respiratory  o Respiratory Effort  o : breathing comfortably, symmetric chest rise  o Auscultation of Lungs  o : clear to asculatation bilaterally, no wheezes, rales, or rhonchii  · Cardiovascular  o Heart  o :   § Auscultation of Heart  § : regular rate and rhythm, no murmurs, rubs, or gallops  o Peripheral Vascular System  o :   § Extremities  § : no edema  · Skin and Subcutaneous Tissue  o General Inspection  o : left outer breast with 1.5cm oval shaped brown lesion, left lower abd with 2.5cm oval brown shaped lesion  · Neurologic  o Mental Status Examination  o :   § Orientation  § : grossly oriented to person, place and time  o Gait and Station  o :   § Gait Screening  § : normal gait  · Psychiatric  o General  o : normal mood and affect     right knee with warmth, no erythema, slight swelling  left knee with crepitus, " but not warmth or erythema, slight swelling    left breast normal, other than skin lesion and 1.5cm somewhat compressible lesion between breasts               Assessment  · Gout     274.9/M10.9  · Mixed hyperlipidemia     272.2/E78.2  · Hypertension, essential     401.9/I10  stable  cont to monitor  · Knee pain     719.46/M25.569  worrisome for septic joing vs gout   will check labs  determine treatment vs ortho referral asap  · Screening mammogram, encounter for     V76.12/Z12.31  · Breast lesion     611.9/N64.9  skin lesion appears to be seb keratosis  will schedule for possible biopsy    central lesion likely sebaceous cyst, will get mammo first and proceed from there  · Right knee pain     719.46/M25.561    Problems Reconciled  Plan  · Orders  o CBC with Auto Diff [facility] (00737) - 274.9/M10.9, 719.46/M25.569 - 09/28/2020  o CMP [facility] (85061) - 401.9/I10 - 09/28/2020  o Lipid Panel [facility] (90662) - 272.2/E78.2 - 09/28/2020  o ACO-39: Current medications updated and reviewed (1159F, ) - - 09/28/2020  o Sed rate (38600) - 274.9/M10.9, 719.46/M25.569 - 09/28/2020  o Uric Acid Serum [facility] (70363) - 274.9/M10.9, 719.46/M25.569 - 09/28/2020  o Mammogram breast screening 3D digital bilateral (07448, , 10624) - V76.12/Z12.31 - 09/28/2020  o Knee (Right) 3 views X-Ray [facility] Preferred View (17316-JB) - 719.46/M25.561 - 09/28/2020   done in office  · Medications  o Medications have been Reconciled  o Transition of Care or Provider Policy  · Instructions  o Patient was educated/instructed on their diagnosis, treatment and medications prior to discharge from the clinic today.            Electronically Signed by: Lisa Jones MD -Author on September 28, 2020 10:36:23 AM

## 2021-05-13 NOTE — PROGRESS NOTES
Progress Note      Patient Name: India Gaytan   Patient ID: [id number]   Sex: Female   YOB: 1955    Primary Care Provider: Lisa Jones MD    Visit Date: May 20, 2020    Provider: Eliseo Cannon MD   Location[facility]   Location Address: [address] [place] [address]  [id number]   Location Phone: [phone]          Chief Complaint  · Follow Up Surgery      History Of Present Illness  India Gaytan is a 64 year old /White female who presents today for a postoperative visit. He presents today for a follow-up status post colon resection for a colovaginal fistula. The patient has done well after her procedure. She reports that her appetite is a little bit poor but seems to be getting better. She is not reporting any unexpected symptoms. No nausea or vomiting. No fevers or chills. Her abdominal pain is getting better. She still has some incisional pain but seems to be improving. She is not reporting any drainage from her vagina. She is having regular bowel movements.       Past Medical History  Anemia; Essential hypertension; Gout; High cholesterol; Hypertension, essential; Lower extremity edema; Mixed hyperlipidemia; Osteoarthritis of right hip; Pain of right hip joint; Radiculopathy Right Lower Extremity; Seasonal allergies         Past Surgical History  Colonoscopy; Hysterectomy; Ovaries Removed; Rotator Cuff repair         Medication List  Benicar HCT 20-12.5 mg oral tablet; fenofibrate 150 mg oral capsule; fluticasone propionate 50 mcg/actuation nasal spray,suspension; furosemide 20 mg oral tablet; hydrocodone-acetaminophen 5-325 mg oral tablet; potassium chloride 10 mEq oral capsule, extended release; Singulair 10 mg oral tablet         Allergy List  levofloxacin       Allergies Reconciled  Family Medical History  Heart Disease; Family history of heart disease         Reproductive History   4 Para 4 0 0 0       Social History  Alcohol (Current some  "day); lives alone; Tobacco (Never); ; Working         Immunizations  Name Date Admin   Tdap          Review of Systems  · Constitutional  o Denies  o : chills, fever  · Gastrointestinal  o Denies  o : nausea, vomiting      Vitals  Date Time BP Position Site L\R Cuff Size HR RR TEMP (F) WT  HT  BMI kg/m2 BSA m2 O2 Sat HC       05/20/2020 11:12 AM       16  214lbs 16oz 5'  6.5\" 34.18 2.14           Physical Examination  · Constitutional  o Appearance  o : well developed, well-nourished, alert and in no acute distress  · Head and Face  o Head  o :   § Inspection  § : no deformities or lesions  · Eyes  o Conjunctivae  o : clear  o Sclerae  o : nonicteric  · Neck  o Inspection/Palpation  o : normal appearance, no masses or tenderness, trachea midline  · Respiratory  o Respiratory Effort  o : breathing unlabored  o Inspection of Chest  o : normal appearance, no retractions  · Cardiovascular  o Heart  o : regular rate and rhythm  · Gastrointestinal  o Abdominal Examination  o :   § Abdomen  § : abdomen is soft. Incision appears to be healing well. She did suffer a minor wound infection in the hospital. She has been packing her midline wound and those areas appear to be healing well. Incision otherwise looks good. No signs of erythema or drainage. I removed her staples today and repacked the wound.  · Lymphatic  o Neck  o : no lymphadenopathy present  o Axilla  o : no lymphadenopathy present  o Groin  o : no lymphadenopathy present  · Skin and Subcutaneous Tissue  o General Inspection  o : no rashes present, no lesions present, no areas of discoloration  · Neurologic  o Cranial Nerves  o : grossly intact  o Sensation  o : grossly intact  o Gait and Station  o :   § Gait Screening  § : normal gait, able to stand without diffculty  o Cerebellar Function  o : no obvious abnormalities  · Psychiatric  o Judgement and Insight  o : judgment and insight intact  o Mood and Affect  o : mood normal, affect " appropriate          Assessment  · Postoperative Exam Following Surgery     V67.00       Patient status post excision of a colovaginal fistula. Her pathology revealed diverticulosis, serosal erosions, and viable margins with two benign lymph nodes.     Problems Reconciled  Plan  · Medications  o Medications have been Reconciled  o Transition of Care or Provider Policy  · Instructions  o Electronically Identified Patient Education Materials Provided Electronically     The patient is doing well and healing as expected. I am pleased with her overall progress. I will follow-up with her again in a couple of weeks to recheck the wound and make sure the areas that are getting packed are healing appropriately. Otherwise, I am very pleased with her progress. When I see her in two weeks, she will need a six month recommendation follow-up for a repeat colonoscopy. Otherwise, she is overall doing quite well. I discussed all of this with the patient. All questions were answered.  She voiced understanding and agreed to proceed with the above plan.             Electronically Signed by: Ofelia Joiner-, -Author on May 27, 2020 08:20:14 AM  Electronically Co-signed by: Eliseo Cannon MD -Reviewer on May 27, 2020 09:06:38 AM

## 2021-05-13 NOTE — PROGRESS NOTES
Progress Note      Patient Name: India Gaytan   Patient ID: [id number]   Sex: Female   YOB: 1955    Primary Care Provider: Lisa Jones MD    Visit Date: June 18, 2020    Provider: MARISELA Goff   Location: [facility]   Location Address: [address] [place] [address], [address]  [place], [place]  [id number]   Location Phone: [phone]          Chief Complaint  · Back pain       History Of Present Illness  India Gaytan is a 64 year old /White female who presents for evaluation and treatment of:      Patient reports a fall back in March where she injured her lower back. Patient was on Levaquin and passed out in the hospital at that time.     Pain is constant and diffuse across the lower back and feels like a deep pain.   no loss of bowel/bladder     Taking Tylenol 500 mg every 6 hours, Ibuprofen 800 mg   Iceyhot to the back.   Ice to the back every 3-4 hours.           Past Medical History  Disease Name Date Onset Notes   Anemia --  --    Essential hypertension --  --    Gout 05/30/2019 greatly improved   High cholesterol --  --    Hypertension, essential --  --    Lower extremity edema 09/18/2017 cont compression stockings can do lymphedema therapy if she wishes in the future   Mixed hyperlipidemia --  --    Osteoarthritis of right hip 06/12/2015 --    Pain of right hip joint 09/18/2017 has chronic arthritis of that hip with tendonitis seen previously in MRI however she doesn't wish to do pt at this time, will try stretches that she has done in the past at home currently   Radiculopathy Right Lower Extremity 06/12/2015 --    Seasonal allergies --  --          Past Surgical History  Procedure Name Date Notes   Colonoscopy 2006 2019 2020 --    Hysterectomy 1996 --    Ovaries Removed 1999 --    Rotator Cuff repair 2011 --          Medication List  Name Date Started Instructions   fenofibrate 150 mg oral capsule 04/16/2020 take 1 capsule (150 mg) by oral route once daily  with food for 90 days   fluticasone propionate 50 mcg/actuation nasal spray,suspension 2019 spray 2 sprays (100 mcg) in each nostril by intranasal route once daily as needed for 30 days   furosemide 20 mg oral tablet 06/15/2020 take 1 tablet by oral route 2 times a day for 90 days   olmesartan-hydrochlorothiazide 20-12.5 mg oral tablet 06/15/2020 TAKE 1 TABLET BY MOUTH EVERY DAY   potassium chloride 10 mEq oral capsule, extended release 2019 take 1 capsule by oral route 2 times a day for 90 days   Singulair 10 mg oral tablet 2019 take 1 tablet (10 mg) by oral route once daily in the evening         Allergy List  Allergen Name Date Reaction Notes   levofloxacin --  --  --          Family Medical History  Disease Name Relative/Age Notes   Heart Disease Father/   --    Family history of heart disease Father/   Father         Reproductive History  Menstrual   Pregnancy Summary   Total Pregnancies: 4 Full Term: 4 Premature: 0   Ab Induced: 0 Ab Spontaneous: 0 Ectopics: 0   Multiples: 0 Livin         Social History  Finding Status Start/Stop Quantity Notes   Alcohol Current some day --/-- --  rarely drinks  social   lives alone --  --/-- --  --    Tobacco Never --/-- --  2017 -     --  --/-- --  --    Working --  --/-- --  --          Immunizations  NameDate Admin Mfg Trade Name Lot Number Route Inj VIS Given VIS Publication   Tdap2016 SKB Td K2D2T IM RD 2016   Comments: Patient tolerated well, left office in stable condition.         Review of Systems  · Constitutional  o Denies  o : fever, fatigue, weight loss, weight gain  · Cardiovascular  o Denies  o : lower extremity edema, claudication, chest pressure, palpitations  · Respiratory  o Denies  o : shortness of breath, wheezing, cough, hemoptysis, dyspnea on exertion  · Gastrointestinal  o Denies  o : nausea, vomiting, diarrhea, constipation, abdominal pain  · Musculoskeletal  o Admits  o : back  "pain      Vitals  Date Time BP Position Site L\R Cuff Size HR RR TEMP (F) WT  HT  BMI kg/m2 BSA m2 O2 Sat HC       06/03/2020 10:33 AM       12  216lbs 8oz 5'  6.5\" 34.42 2.15     06/17/2020 10:32 AM       14  216lbs 0oz 5'  6.5\" 34.34 2.14     06/18/2020 03:00 /72 Sitting    55 - R  96.9 221lbs 4oz 5'  6\" 35.71 2.16 97 %          Physical Examination  · Constitutional  o Appearance  o : no acute distress, well-nourished  · Head and Face  o Head  o :   § Inspection  § : atraumatic, normocephalic  · Ears, Nose, Mouth and Throat  o Ears  o :   § External Ears  § : normal  o Nose  o :   § Intranasal Exam  § : nares patent  o Oral Cavity  o :   § Oral Mucosa  § : moist mucous membranes  · Respiratory  o Respiratory Effort  o : breathing comfortably, symmetric chest rise  o Auscultation of Lungs  o : clear to asculatation bilaterally, no wheezes, rales, or rhonchii  · Cardiovascular  o Heart  o :   § Auscultation of Heart  § : regular rate and rhythm, no murmurs, rubs, or gallops  o Peripheral Vascular System  o :   § Extremities  § : no edema  · Neurologic  o Mental Status Examination  o :   § Orientation  § : grossly oriented to person, place and time  o Gait and Station  o :   § Gait Screening  § : normal gait  · Back  o Inspection  o : no redness, no deformities  o Palpation  o : no tenderness, no swelling, midline NTTP, paraspinal NTTP  o Range of Motion  o : decreased ROM by 50% due to pain   o Reflexes  o : patellar reflex normal  o Gait  o : normal gait              Assessment  · Lumbago     724.2/M54.5  · Compression fracture of T11 vertebra     805.2/S22.080A  xray displays progression of compression fracture of T11. Discussed with patient. Will do MRI and order neurosurgery consult. Discussed correct dosing of ibuprofen and Tylenol and to ensure she is drinking plenty of fluids due to kidney function.     Problems Reconciled  Plan  · Orders  o Lumbar Spine Complete [facility] ([id number]) - 724.2/M54.5 - " 06/18/2020  o ACO-39: Current medications updated and reviewed () - - 06/18/2020  o MRI thoracic spine w/o contrst (56820) - 724.2/M54.5, 805.2/S22.080A - 06/18/2020   compression fracture T11, pt request this to be after July 6th  o NEUROSURGEON CONSULTATION ([facility]) - - 06/18/2020   , patient would like it to be after July 6th   · Medications  o cyclobenzaprine 7.5 mg oral tablet   SIG: take 1 tablet (7.5 mg) by oral route 3 times per day   DISP: (15) tablets with 0 refills  Prescribed on 06/18/2020     o Medications have been Reconciled  o Transition of Care or Provider Policy  · Instructions  o Patient was educated/instructed on their diagnosis, treatment and medications prior to discharge from the clinic today.  o Call the office with any concerns or questions.  o Electronically Identified Patient Education Materials Provided Electronically  · Disposition  o Call or Return if symptoms worsen or persist.  o Meds sent to pharmacy  o X-rays here  o Care Transition  o MEGAN Sent            Electronically Signed by: Georgia Herrera, APRN -Author on June 18, 2020 05:24:49 PM

## 2021-05-14 VITALS — HEIGHT: 67 IN | WEIGHT: 238.5 LBS | BODY MASS INDEX: 37.43 KG/M2

## 2021-05-14 VITALS
TEMPERATURE: 97.6 F | RESPIRATION RATE: 16 BRPM | SYSTOLIC BLOOD PRESSURE: 136 MMHG | HEART RATE: 61 BPM | HEIGHT: 67 IN | BODY MASS INDEX: 37.55 KG/M2 | OXYGEN SATURATION: 97 % | WEIGHT: 239.25 LBS | DIASTOLIC BLOOD PRESSURE: 78 MMHG

## 2021-05-14 VITALS — BODY MASS INDEX: 35.96 KG/M2 | HEIGHT: 67 IN | TEMPERATURE: 97.5 F | WEIGHT: 229.12 LBS

## 2021-05-14 VITALS
OXYGEN SATURATION: 97 % | SYSTOLIC BLOOD PRESSURE: 132 MMHG | DIASTOLIC BLOOD PRESSURE: 78 MMHG | TEMPERATURE: 97.1 F | BODY MASS INDEX: 36.43 KG/M2 | HEART RATE: 72 BPM | HEIGHT: 67 IN | WEIGHT: 232.12 LBS

## 2021-05-14 VITALS — WEIGHT: 238.37 LBS | HEIGHT: 66 IN | BODY MASS INDEX: 38.31 KG/M2

## 2021-05-14 VITALS — HEIGHT: 67 IN | BODY MASS INDEX: 36.88 KG/M2 | WEIGHT: 235 LBS

## 2021-05-14 NOTE — PROGRESS NOTES
Progress Note      Patient Name: India Gaytan   Patient ID: [id number]   Sex: Female   YOB: 1955    Primary Care Provider: Lisa Jones MD   Referring Provider: Lisa Jones MD    Visit Date: January 6, 2021    Provider: Eliseo Cannon MD   Location: [facility]   Location Address: [address] [place] [address]  [id number]   Location Phone: [phone]          Chief Complaint  · Follow Up Surgery      History Of Present Illness  India Gaytan is a 65 year old /White female who presents today for a postoperative visit. She follows-up status post incisional hernia repair. The patient reports she has done well after her procedure. She is not reporting any unexpected signs or symptoms. She is eating and drinking normally and having regular bowel movements. The pain is resolving. She seems to be doing better. She reports that one time when she sat down, she had a little pulling sensation at her upper port site incision. Otherwise, no complaints.       Past Medical History  Allergic rhinitis, chronic; Anemia; Essential hypertension; Gout; High cholesterol; Hyperlipidemia; Hypertension; Hypertension, Benign Essential; Hypertension, essential; Lower extremity edema; Mixed hyperlipidemia; Osteoarthritis of right hip; Pain of right hip joint; Radiculopathy Right Lower Extremity; Seasonal allergies         Past Surgical History  Back; Colon; Colonoscopy; Hernia; Hysterectomy; Hysterectomy-Abdominal; Ovaries Removed; Rotator Cuff repair; Vertebroplasty         Medication List  fluticasone propionate 50 mcg/actuation nasal spray,suspension; furosemide 20 mg oral tablet; olmesartan-hydrochlorothiazide 20-12.5 mg oral tablet; potassium chloride 10 mEq oral capsule, extended release; Singulair 10 mg oral tablet; Tricor 145 mg oral tablet; Voltaren 1 % topical gel         Allergy List  levofloxacin         Family Medical History  Heart Disease; Family history of  "heart disease         Reproductive History   4 Para 4 0 0 0       Social History  Alcohol Use; lives alone; Recreational Drug Use (Never); Tobacco (Never); ; ; Working         Immunizations  Name Date Admin   Tdap 2016         Review of Systems  · Cardiovascular  o Denies  o : chest pain on exertion, shortness of breath, lower extremity swelling  · Respiratory  o Denies  o : shortness of breath, coughing up blood  · Gastrointestinal  o Denies  o : chronic abdominal pain      Vitals  Date Time BP Position Site L\R Cuff Size HR RR TEMP (F) WT  HT  BMI kg/m2 BSA m2 O2 Sat FR L/min FiO2 HC       2021 01:40 PM         238lbs 6oz 5'  6\" 38.47 2.24             Physical Examination  · Constitutional  o Appearance  o : well developed, well-nourished, alert and in no acute distress  · Head and Face  o Head  o :   § Inspection  § : no deformities or lesions  · Eyes  o Conjunctivae  o : clear  o Sclerae  o : nonicteric  · Neck  o Inspection/Palpation  o : normal appearance, no masses or tenderness, trachea midline  · Respiratory  o Respiratory Effort  o : breathing unlabored  o Inspection of Chest  o : normal appearance, no retractions  · Cardiovascular  o Heart  o : regular rate and rhythm  · Gastrointestinal  o Abdominal Examination  o :   § Abdomen  § : abdomen is soft. Incisions appear to be healing appropriately. No signs of recurrence on exam.   · Lymphatic  o Neck  o : no lymphadenopathy present  o Axilla  o : no lymphadenopathy present  o Groin  o : no lymphadenopathy present  · Skin and Subcutaneous Tissue  o General Inspection  o : no rashes present, no lesions present, no areas of discoloration  · Neurologic  o Cranial Nerves  o : grossly intact  o Sensation  o : grossly intact  o Gait and Station  o :   § Gait Screening  § : normal gait, able to stand without diffculty  o Cerebellar Function  o : no obvious abnormalities  · Psychiatric  o Judgement and Insight  o : judgment and insight " intact  o Mood and Affect  o : mood normal, affect appropriate          Assessment  · Encounter for examination following surgery     V67.00/Z09       Patient status post incisional hernia repair.       Plan  · Medications  o Medications have been Reconciled  o Transition of Care or Provider Policy  · Instructions  o Follow up as needed.  o Electronically Identified Patient Education Materials Provided Electronically     The patient is doing well and healing as expected. I am pleased with her overall progress. At this point time, she can follow-up with me as needed. Call with any questions or concerns. I discussed all of this with the patient. All questions were answered.  She voiced understanding and agreed to proceed with the above plan.             Electronically Signed by: Ofelia Joiner-, -Author on January 7, 2021 10:54:44 AM  Electronically Co-signed by: Eliseo Cannon MD -Reviewer on January 8, 2021 05:18:27 PM

## 2021-05-15 VITALS
RESPIRATION RATE: 16 BRPM | WEIGHT: 228.37 LBS | HEART RATE: 73 BPM | DIASTOLIC BLOOD PRESSURE: 79 MMHG | HEIGHT: 67 IN | TEMPERATURE: 97.9 F | SYSTOLIC BLOOD PRESSURE: 123 MMHG | BODY MASS INDEX: 35.84 KG/M2 | OXYGEN SATURATION: 99 %

## 2021-05-15 VITALS
DIASTOLIC BLOOD PRESSURE: 74 MMHG | TEMPERATURE: 97.8 F | HEIGHT: 67 IN | OXYGEN SATURATION: 94 % | SYSTOLIC BLOOD PRESSURE: 132 MMHG | HEART RATE: 73 BPM | BODY MASS INDEX: 36.88 KG/M2 | WEIGHT: 235 LBS

## 2021-05-15 VITALS
DIASTOLIC BLOOD PRESSURE: 72 MMHG | WEIGHT: 221.25 LBS | BODY MASS INDEX: 35.56 KG/M2 | OXYGEN SATURATION: 97 % | SYSTOLIC BLOOD PRESSURE: 120 MMHG | TEMPERATURE: 96.9 F | HEART RATE: 55 BPM | HEIGHT: 66 IN

## 2021-05-15 VITALS — HEIGHT: 67 IN | TEMPERATURE: 97.3 F | WEIGHT: 228.06 LBS | BODY MASS INDEX: 35.8 KG/M2

## 2021-05-15 VITALS — WEIGHT: 216.5 LBS | HEIGHT: 66 IN | BODY MASS INDEX: 34.79 KG/M2 | RESPIRATION RATE: 12 BRPM

## 2021-05-15 VITALS
DIASTOLIC BLOOD PRESSURE: 64 MMHG | OXYGEN SATURATION: 99 % | SYSTOLIC BLOOD PRESSURE: 137 MMHG | RESPIRATION RATE: 16 BRPM | BODY MASS INDEX: 35.94 KG/M2 | HEIGHT: 67 IN | WEIGHT: 229 LBS | HEART RATE: 60 BPM

## 2021-05-15 VITALS — HEIGHT: 67 IN | WEIGHT: 224.12 LBS | BODY MASS INDEX: 35.18 KG/M2 | RESPIRATION RATE: 16 BRPM

## 2021-05-15 VITALS — RESPIRATION RATE: 12 BRPM | BODY MASS INDEX: 34.88 KG/M2 | HEIGHT: 67 IN | WEIGHT: 222.25 LBS

## 2021-05-15 VITALS
DIASTOLIC BLOOD PRESSURE: 72 MMHG | OXYGEN SATURATION: 95 % | HEIGHT: 67 IN | HEART RATE: 99 BPM | TEMPERATURE: 96.1 F | SYSTOLIC BLOOD PRESSURE: 142 MMHG | WEIGHT: 232.25 LBS | BODY MASS INDEX: 36.45 KG/M2

## 2021-05-15 VITALS — HEIGHT: 66 IN | RESPIRATION RATE: 14 BRPM | WEIGHT: 216 LBS | BODY MASS INDEX: 34.72 KG/M2

## 2021-05-15 VITALS — WEIGHT: 215 LBS | RESPIRATION RATE: 16 BRPM | BODY MASS INDEX: 34.55 KG/M2 | HEIGHT: 66 IN

## 2021-05-16 VITALS
HEIGHT: 67 IN | BODY MASS INDEX: 34.1 KG/M2 | RESPIRATION RATE: 16 BRPM | WEIGHT: 217.25 LBS | OXYGEN SATURATION: 97 % | DIASTOLIC BLOOD PRESSURE: 68 MMHG | TEMPERATURE: 97.2 F | SYSTOLIC BLOOD PRESSURE: 113 MMHG | HEART RATE: 85 BPM

## 2021-05-16 VITALS
HEIGHT: 67 IN | RESPIRATION RATE: 16 BRPM | OXYGEN SATURATION: 96 % | HEART RATE: 69 BPM | DIASTOLIC BLOOD PRESSURE: 68 MMHG | TEMPERATURE: 96.7 F | BODY MASS INDEX: 34.12 KG/M2 | SYSTOLIC BLOOD PRESSURE: 119 MMHG | WEIGHT: 217.37 LBS

## 2021-06-07 ENCOUNTER — CLINICAL SUPPORT (OUTPATIENT)
Dept: INTERNAL MEDICINE | Facility: CLINIC | Age: 66
End: 2021-06-07

## 2021-06-07 DIAGNOSIS — J30.9 ALLERGIC RHINITIS, UNSPECIFIED SEASONALITY, UNSPECIFIED TRIGGER: Primary | ICD-10-CM

## 2021-06-07 PROCEDURE — 95117 IMMUNOTHERAPY INJECTIONS: CPT | Performed by: INTERNAL MEDICINE

## 2021-06-09 ENCOUNTER — CLINICAL SUPPORT (OUTPATIENT)
Dept: INTERNAL MEDICINE | Facility: CLINIC | Age: 66
End: 2021-06-09

## 2021-06-09 DIAGNOSIS — Z29.8 IMMUNOTHERAPY: Primary | ICD-10-CM

## 2021-06-09 DIAGNOSIS — J30.9 ALLERGIC RHINITIS, UNSPECIFIED SEASONALITY, UNSPECIFIED TRIGGER: ICD-10-CM

## 2021-06-09 PROCEDURE — 95117 IMMUNOTHERAPY INJECTIONS: CPT | Performed by: INTERNAL MEDICINE

## 2021-06-11 ENCOUNTER — CLINICAL SUPPORT (OUTPATIENT)
Dept: INTERNAL MEDICINE | Facility: CLINIC | Age: 66
End: 2021-06-11

## 2021-06-11 DIAGNOSIS — J30.1 SEASONAL ALLERGIC RHINITIS DUE TO POLLEN: Primary | ICD-10-CM

## 2021-06-11 PROCEDURE — 95117 IMMUNOTHERAPY INJECTIONS: CPT | Performed by: INTERNAL MEDICINE

## 2021-06-21 ENCOUNTER — CLINICAL SUPPORT (OUTPATIENT)
Dept: INTERNAL MEDICINE | Facility: CLINIC | Age: 66
End: 2021-06-21

## 2021-06-21 DIAGNOSIS — J30.1 SEASONAL ALLERGIC RHINITIS DUE TO POLLEN: Primary | ICD-10-CM

## 2021-06-21 PROCEDURE — 95117 IMMUNOTHERAPY INJECTIONS: CPT | Performed by: INTERNAL MEDICINE

## 2021-06-23 ENCOUNTER — PRIOR AUTHORIZATION (OUTPATIENT)
Dept: INTERNAL MEDICINE | Facility: CLINIC | Age: 66
End: 2021-06-23

## 2021-06-23 ENCOUNTER — OFFICE VISIT (OUTPATIENT)
Dept: INTERNAL MEDICINE | Facility: CLINIC | Age: 66
End: 2021-06-23

## 2021-06-23 VITALS
DIASTOLIC BLOOD PRESSURE: 76 MMHG | HEIGHT: 66 IN | WEIGHT: 240.6 LBS | OXYGEN SATURATION: 97 % | BODY MASS INDEX: 38.67 KG/M2 | TEMPERATURE: 97 F | HEART RATE: 85 BPM | SYSTOLIC BLOOD PRESSURE: 120 MMHG

## 2021-06-23 DIAGNOSIS — I10 ESSENTIAL HYPERTENSION: Primary | ICD-10-CM

## 2021-06-23 DIAGNOSIS — E66.9 OBESITY (BMI 35.0-39.9 WITHOUT COMORBIDITY): ICD-10-CM

## 2021-06-23 DIAGNOSIS — J30.9 ALLERGIC RHINITIS, UNSPECIFIED SEASONALITY, UNSPECIFIED TRIGGER: ICD-10-CM

## 2021-06-23 DIAGNOSIS — K43.2 INCISIONAL HERNIA, WITHOUT OBSTRUCTION OR GANGRENE: ICD-10-CM

## 2021-06-23 DIAGNOSIS — Z11.59 NEED FOR HEPATITIS C SCREENING TEST: ICD-10-CM

## 2021-06-23 DIAGNOSIS — E78.2 MIXED HYPERLIPIDEMIA: ICD-10-CM

## 2021-06-23 DIAGNOSIS — J30.2 SEASONAL ALLERGIC RHINITIS, UNSPECIFIED TRIGGER: ICD-10-CM

## 2021-06-23 PROBLEM — M10.9 GOUT: Status: ACTIVE | Noted: 2019-05-30

## 2021-06-23 PROBLEM — R60.0 LOWER EXTREMITY EDEMA: Status: ACTIVE | Noted: 2017-09-18

## 2021-06-23 PROBLEM — D64.9 ANEMIA: Status: ACTIVE | Noted: 2021-06-23

## 2021-06-23 LAB
ALBUMIN SERPL-MCNC: 4.4 G/DL (ref 3.5–5.2)
ALBUMIN/GLOB SERPL: 2.3 G/DL
ALP SERPL-CCNC: 87 U/L (ref 39–117)
ALT SERPL W P-5'-P-CCNC: 23 U/L (ref 1–33)
ANION GAP SERPL CALCULATED.3IONS-SCNC: 11.3 MMOL/L (ref 5–15)
AST SERPL-CCNC: 25 U/L (ref 1–32)
BASOPHILS # BLD AUTO: 0.05 10*3/MM3 (ref 0–0.2)
BASOPHILS NFR BLD AUTO: 0.9 % (ref 0–1.5)
BILIRUB SERPL-MCNC: 0.2 MG/DL (ref 0–1.2)
BUN SERPL-MCNC: 26 MG/DL (ref 8–23)
BUN/CREAT SERPL: 28.3 (ref 7–25)
CALCIUM SPEC-SCNC: 9.7 MG/DL (ref 8.6–10.5)
CHLORIDE SERPL-SCNC: 101 MMOL/L (ref 98–107)
CHOLEST SERPL-MCNC: 144 MG/DL (ref 0–200)
CO2 SERPL-SCNC: 27.7 MMOL/L (ref 22–29)
CREAT SERPL-MCNC: 0.92 MG/DL (ref 0.57–1)
DEPRECATED RDW RBC AUTO: 43.7 FL (ref 37–54)
EOSINOPHIL # BLD AUTO: 0.28 10*3/MM3 (ref 0–0.4)
EOSINOPHIL NFR BLD AUTO: 5.1 % (ref 0.3–6.2)
ERYTHROCYTE [DISTWIDTH] IN BLOOD BY AUTOMATED COUNT: 13.9 % (ref 12.3–15.4)
GFR SERPL CREATININE-BSD FRML MDRD: 62 ML/MIN/1.73
GLOBULIN UR ELPH-MCNC: 1.9 GM/DL
GLUCOSE SERPL-MCNC: 97 MG/DL (ref 65–99)
HCT VFR BLD AUTO: 40.6 % (ref 34–46.6)
HCV AB SER DONR QL: NORMAL
HDLC SERPL-MCNC: 47 MG/DL (ref 40–60)
HGB BLD-MCNC: 13.1 G/DL (ref 12–15.9)
IMM GRANULOCYTES # BLD AUTO: 0.02 10*3/MM3 (ref 0–0.05)
IMM GRANULOCYTES NFR BLD AUTO: 0.4 % (ref 0–0.5)
LDLC SERPL CALC-MCNC: 72 MG/DL (ref 0–100)
LDLC/HDLC SERPL: 1.46 {RATIO}
LYMPHOCYTES # BLD AUTO: 1.89 10*3/MM3 (ref 0.7–3.1)
LYMPHOCYTES NFR BLD AUTO: 34.2 % (ref 19.6–45.3)
MCH RBC QN AUTO: 27.7 PG (ref 26.6–33)
MCHC RBC AUTO-ENTMCNC: 32.3 G/DL (ref 31.5–35.7)
MCV RBC AUTO: 85.8 FL (ref 79–97)
MONOCYTES # BLD AUTO: 0.45 10*3/MM3 (ref 0.1–0.9)
MONOCYTES NFR BLD AUTO: 8.2 % (ref 5–12)
NEUTROPHILS NFR BLD AUTO: 2.83 10*3/MM3 (ref 1.7–7)
NEUTROPHILS NFR BLD AUTO: 51.2 % (ref 42.7–76)
NRBC BLD AUTO-RTO: 0 /100 WBC (ref 0–0.2)
PLATELET # BLD AUTO: 389 10*3/MM3 (ref 140–450)
PMV BLD AUTO: 10.6 FL (ref 6–12)
POTASSIUM SERPL-SCNC: 4.7 MMOL/L (ref 3.5–5.2)
PROT SERPL-MCNC: 6.3 G/DL (ref 6–8.5)
RBC # BLD AUTO: 4.73 10*6/MM3 (ref 3.77–5.28)
SODIUM SERPL-SCNC: 140 MMOL/L (ref 136–145)
TRIGL SERPL-MCNC: 143 MG/DL (ref 0–150)
VLDLC SERPL-MCNC: 25 MG/DL (ref 5–40)
WBC # BLD AUTO: 5.52 10*3/MM3 (ref 3.4–10.8)

## 2021-06-23 PROCEDURE — 80053 COMPREHEN METABOLIC PANEL: CPT | Performed by: INTERNAL MEDICINE

## 2021-06-23 PROCEDURE — 85025 COMPLETE CBC W/AUTO DIFF WBC: CPT | Performed by: INTERNAL MEDICINE

## 2021-06-23 PROCEDURE — 80061 LIPID PANEL: CPT | Performed by: INTERNAL MEDICINE

## 2021-06-23 PROCEDURE — 86803 HEPATITIS C AB TEST: CPT | Performed by: INTERNAL MEDICINE

## 2021-06-23 PROCEDURE — 99214 OFFICE O/P EST MOD 30 MIN: CPT | Performed by: INTERNAL MEDICINE

## 2021-06-23 PROCEDURE — 95117 IMMUNOTHERAPY INJECTIONS: CPT | Performed by: INTERNAL MEDICINE

## 2021-06-23 RX ORDER — FUROSEMIDE 20 MG/1
20 TABLET ORAL 2 TIMES DAILY
COMMUNITY
Start: 2021-03-25 | End: 2021-07-08 | Stop reason: SDUPTHER

## 2021-06-23 RX ORDER — LEVOCETIRIZINE DIHYDROCHLORIDE 5 MG/1
5 TABLET, FILM COATED ORAL DAILY
COMMUNITY
Start: 2021-05-19 | End: 2021-07-22

## 2021-06-23 RX ORDER — ALBUTEROL SULFATE 90 UG/1
2 AEROSOL, METERED RESPIRATORY (INHALATION) EVERY 4 HOURS PRN
COMMUNITY
Start: 2021-05-19

## 2021-06-23 RX ORDER — FENOFIBRATE 145 MG/1
145 TABLET, COATED ORAL DAILY
COMMUNITY
Start: 2021-05-13 | End: 2021-08-30

## 2021-06-23 RX ORDER — OLMESARTAN MEDOXOMIL AND HYDROCHLOROTHIAZIDE 20/12.5 20; 12.5 MG/1; MG/1
1 TABLET ORAL DAILY
COMMUNITY
Start: 2021-03-25 | End: 2021-06-23 | Stop reason: SDUPTHER

## 2021-06-23 RX ORDER — AZELASTINE 1 MG/ML
SPRAY, METERED NASAL
COMMUNITY
Start: 2021-05-19

## 2021-06-23 RX ORDER — MONTELUKAST SODIUM 10 MG/1
TABLET ORAL
COMMUNITY
Start: 2021-05-13

## 2021-06-23 RX ORDER — OLOPATADINE HYDROCHLORIDE 7 MG/ML
1 SOLUTION OPHTHALMIC DAILY PRN
COMMUNITY
Start: 2021-05-19

## 2021-06-23 RX ORDER — FLUTICASONE PROPIONATE 50 MCG
SPRAY, SUSPENSION (ML) NASAL
COMMUNITY
Start: 2021-05-19

## 2021-06-23 RX ORDER — POTASSIUM CHLORIDE 750 MG/1
10 CAPSULE, EXTENDED RELEASE ORAL 2 TIMES DAILY
COMMUNITY
Start: 2021-03-25 | End: 2021-07-06 | Stop reason: SDUPTHER

## 2021-06-23 RX ORDER — OLMESARTAN MEDOXOMIL AND HYDROCHLOROTHIAZIDE 20/12.5 20; 12.5 MG/1; MG/1
1 TABLET ORAL DAILY
Qty: 90 TABLET | Refills: 0 | Status: SHIPPED | OUTPATIENT
Start: 2021-06-23 | End: 2021-09-28

## 2021-06-23 NOTE — ASSESSMENT & PLAN NOTE
Will send in another referral for Dr Cannon for possible recurrent abdominal hernia  Will order CT abdomen and pelvis without contrast per his request after discussing with him

## 2021-06-23 NOTE — ASSESSMENT & PLAN NOTE
She has been working on her diet changes and exercising. She is still having difficulty losing weight. Will try Saxenda for added help.   Concerned extra weight is related to her recurrent abdominal hernias.

## 2021-06-23 NOTE — PROGRESS NOTES
"Chief Complaint  possible hernia    Subjective          India Gaytan presents to [facility]  History of Present Illness    HTN-  Feels fine  No lows    Leg swelling is improved  Mostly swell when sitting with her feet down  Swelling decreases with walking     Possible Hernia -   When she eats she has abdominal discomfort (feels like something is sticking in her stomach)   Just noticed her upper stomach being distended  Feels like she has a \"pooch\" on her upper abdomen  Had a hernia repair in December, with Dr Alexandra  Feels like it did with the prior hernia  No severe abdominal pain  Having BMs regularly    Still struggling getting her weight off -  Has been working out to try to lose weight at the gym as well as walking  Started in February   Has been counting calories, and decreasing her sugar intake    No chest pain  Occasional trouble breathing with bending over due to pressure on her upper abdomen    Spot on back of her left leg -  Not bothering her  Just noticed it   Doesn't catch it on anything    Objective   Vital Signs:   /76   Pulse 85   Temp 97 °F (36.1 °C)   Ht 167.6 cm (66\")   Wt 109 kg (240 lb 9.6 oz)   SpO2 97%   BMI 38.83 kg/m²     Physical Exam  Vitals reviewed.   Constitutional:       Appearance: Normal appearance. She is well-developed.   HENT:      Head: Normocephalic and atraumatic.      Right Ear: External ear normal.      Left Ear: External ear normal.      Mouth/Throat:      Pharynx: No oropharyngeal exudate.   Eyes:      Conjunctiva/sclera: Conjunctivae normal.      Pupils: Pupils are equal, round, and reactive to light.   Cardiovascular:      Rate and Rhythm: Normal rate and regular rhythm.      Heart sounds: No murmur heard.   No friction rub. No gallop.    Pulmonary:      Effort: Pulmonary effort is normal.      Breath sounds: Normal breath sounds. No wheezing or rhonchi.   Abdominal:      Comments: Approximately 5 cm x " 4 cm ventral hernia in central abdomen just below prior incision site for prior hernia   Skin:     General: Skin is warm and dry.   Neurological:      Mental Status: She is alert and oriented to person, place, and time.      Cranial Nerves: No cranial nerve deficit.   Psychiatric:         Mood and Affect: Affect normal.         Behavior: Behavior normal.         Thought Content: Thought content normal.     Skin lesion looks like a seborrheic keratosis    Result Review :     Common labs    Common Labsle 9/28/20 9/28/20 9/28/20 9/28/20 12/21/20 6/23/21 6/23/21 6/23/21    1018 1018 1018 1018  1000 1000 1000   Glucose        97   Glucose   86  77      BUN   25  27 (A)   26 (A)   Creatinine   0.90  0.91 (A)   0.92   eGFR Non African Am        62   Sodium   140  136   140   Potassium   4.1  3.9   4.7   Chloride   100  100   101   Calcium   9.8  9.8   9.7   Albumin   4.3     4.40   Total Bilirubin   0.29     0.2   Alkaline Phosphatase   87     87   AST (SGOT)   22     25   ALT (SGPT)   19     23   WBC 7.08     5.52     Hemoglobin 12.3     13.1     Hematocrit 39.7     40.6     Platelets 401 (A)     389     Total Cholesterol       144    Total Cholesterol    163       Triglycerides    159 (A)   143    HDL Cholesterol    55   47    LDL Cholesterol     76   72    Uric Acid  8.3 (A)         (A) Abnormal value       Comments are available for some flowsheets but are not being displayed.              Procedures      Assessment and Plan    Diagnoses and all orders for this visit:    1. Essential hypertension (Primary)  Assessment & Plan:  Doing well on current medications  No leg cramps    Orders:  -     olmesartan-hydrochlorothiazide (BENICAR HCT) 20-12.5 MG per tablet; Take 1 tablet by mouth Daily.  Dispense: 90 tablet; Refill: 0  -     CBC w AUTO Differential; Future  -     Comprehensive metabolic panel; Future  -     CBC w AUTO Differential  -     Comprehensive metabolic panel    2. Incisional hernia, without obstruction or  gangrene  Assessment & Plan:  Will send in another referral for Dr Cannon for possible recurrent abdominal hernia  Will order CT abdomen and pelvis without contrast per his request after discussing with him    Orders:  -     Ambulatory Referral to General Surgery  -     Liraglutide (SAXENDA) 18 MG/3ML injection pen; Inject 0.6mg under skin daily for week one, THEN 1.2mg daily for week two, THEN 1.8mg daily for week three, then 2.4mg daily for week four.  Dispense: 3 pen; Refill: 0  -     CT Abdomen Pelvis Without Contrast; Future    3. Body mass index (BMI) of 38.0 to 38.9 in adult  Assessment & Plan:  She has been working on her diet changes and exercising. She is still having difficulty losing weight. Will try Saxenda for added help.   Concerned extra weight is related to her recurrent abdominal hernias.     Orders:  -     Liraglutide (SAXENDA) 18 MG/3ML injection pen; Inject 0.6mg under skin daily for week one, THEN 1.2mg daily for week two, THEN 1.8mg daily for week three, then 2.4mg daily for week four.  Dispense: 3 pen; Refill: 0  -     CBC w AUTO Differential; Future  -     CBC w AUTO Differential    4. Need for hepatitis C screening test  -     Hepatitis C Antibody; Future  -     Hepatitis C Antibody    5. Mixed hyperlipidemia  Assessment & Plan:  Will draw labs this visit  Still taking Tricor    Orders:  -     Lipid panel; Future  -     Lipid panel    6. Allergic rhinitis, unspecified seasonality, unspecified trigger  -     patient supplied allergy injection    7. Seasonal allergic rhinitis, unspecified trigger    8. Obesity (BMI 35.0-39.9 without comorbidity)      Follow Up   Return in about 3 months (around 9/23/2021).  Patient was given instructions and counseling regarding her condition or for health maintenance advice. Please see specific information pulled into the AVS if appropriate.

## 2021-06-25 ENCOUNTER — CLINICAL SUPPORT (OUTPATIENT)
Dept: INTERNAL MEDICINE | Facility: CLINIC | Age: 66
End: 2021-06-25

## 2021-06-25 DIAGNOSIS — J30.9 ALLERGIC RHINITIS, UNSPECIFIED SEASONALITY, UNSPECIFIED TRIGGER: Primary | ICD-10-CM

## 2021-06-25 PROCEDURE — 95117 IMMUNOTHERAPY INJECTIONS: CPT | Performed by: INTERNAL MEDICINE

## 2021-06-28 ENCOUNTER — CLINICAL SUPPORT (OUTPATIENT)
Dept: INTERNAL MEDICINE | Facility: CLINIC | Age: 66
End: 2021-06-28

## 2021-06-28 ENCOUNTER — TELEPHONE (OUTPATIENT)
Dept: INTERNAL MEDICINE | Facility: CLINIC | Age: 66
End: 2021-06-28

## 2021-06-28 DIAGNOSIS — J30.9 ALLERGIC RHINITIS, UNSPECIFIED SEASONALITY, UNSPECIFIED TRIGGER: Primary | ICD-10-CM

## 2021-06-28 PROCEDURE — 95117 IMMUNOTHERAPY INJECTIONS: CPT | Performed by: INTERNAL MEDICINE

## 2021-06-28 NOTE — TELEPHONE ENCOUNTER
Caller: India Gaytan    Relationship: Self    Best call back number: [phone]     What is the best time to reach you: ANY    What was the call regarding: PATIENT CALLED TO FIND OUT WHEN HER CATSCAN WILL BE SCHEDULED, PLEASE ADVISE, THANK YOU.    Do you require a callback: YES

## 2021-06-29 ENCOUNTER — TELEPHONE (OUTPATIENT)
Dept: INTERNAL MEDICINE | Facility: CLINIC | Age: 66
End: 2021-06-29

## 2021-06-30 RX ORDER — PEN NEEDLE, DIABETIC 30 GX3/16"
1 NEEDLE, DISPOSABLE MISCELLANEOUS AS NEEDED
COMMUNITY
End: 2021-06-30 | Stop reason: SDUPTHER

## 2021-07-01 RX ORDER — PEN NEEDLE, DIABETIC 30 GX3/16"
1 NEEDLE, DISPOSABLE MISCELLANEOUS AS NEEDED
Qty: 50 EACH | Refills: 2 | Status: SHIPPED | OUTPATIENT
Start: 2021-07-01 | End: 2021-07-22

## 2021-07-02 ENCOUNTER — CLINICAL SUPPORT (OUTPATIENT)
Dept: INTERNAL MEDICINE | Facility: CLINIC | Age: 66
End: 2021-07-02

## 2021-07-02 ENCOUNTER — HOSPITAL ENCOUNTER (OUTPATIENT)
Dept: CT IMAGING | Facility: HOSPITAL | Age: 66
Discharge: HOME | End: 2021-07-02
Admitting: INTERNAL MEDICINE

## 2021-07-02 DIAGNOSIS — K43.2 INCISIONAL HERNIA, WITHOUT OBSTRUCTION OR GANGRENE: ICD-10-CM

## 2021-07-02 DIAGNOSIS — J30.9 ALLERGIC RHINITIS, UNSPECIFIED SEASONALITY, UNSPECIFIED TRIGGER: Primary | ICD-10-CM

## 2021-07-02 PROCEDURE — 95117 IMMUNOTHERAPY INJECTIONS: CPT | Performed by: INTERNAL MEDICINE

## 2021-07-02 PROCEDURE — 74176 CT ABD & PELVIS W/O CONTRAST: CPT

## 2021-07-06 RX ORDER — POTASSIUM CHLORIDE 750 MG/1
10 CAPSULE, EXTENDED RELEASE ORAL 2 TIMES DAILY
Qty: 180 CAPSULE | Refills: 0 | Status: SHIPPED | OUTPATIENT
Start: 2021-07-06 | End: 2021-11-05 | Stop reason: SDUPTHER

## 2021-07-07 ENCOUNTER — PREP FOR SURGERY (OUTPATIENT)
Dept: OTHER | Facility: HOSPITAL | Age: 66
End: 2021-07-07

## 2021-07-07 ENCOUNTER — CLINICAL SUPPORT (OUTPATIENT)
Dept: INTERNAL MEDICINE | Facility: CLINIC | Age: 66
End: 2021-07-07

## 2021-07-07 ENCOUNTER — OFFICE VISIT (OUTPATIENT)
Dept: SURGERY | Facility: CLINIC | Age: 66
End: 2021-07-07

## 2021-07-07 VITALS — RESPIRATION RATE: 14 BRPM | HEIGHT: 67 IN | BODY MASS INDEX: 37.57 KG/M2 | WEIGHT: 239.4 LBS

## 2021-07-07 DIAGNOSIS — K43.2 VENTRAL INCISIONAL HERNIA: Primary | ICD-10-CM

## 2021-07-07 DIAGNOSIS — J30.9 ALLERGIC RHINITIS, UNSPECIFIED SEASONALITY, UNSPECIFIED TRIGGER: Primary | ICD-10-CM

## 2021-07-07 PROCEDURE — 99213 OFFICE O/P EST LOW 20 MIN: CPT | Performed by: SURGERY

## 2021-07-07 PROCEDURE — 95117 IMMUNOTHERAPY INJECTIONS: CPT | Performed by: INTERNAL MEDICINE

## 2021-07-07 RX ORDER — SODIUM CHLORIDE 0.9 % (FLUSH) 0.9 %
10 SYRINGE (ML) INJECTION AS NEEDED
Status: CANCELLED | OUTPATIENT
Start: 2021-07-07

## 2021-07-07 RX ORDER — SODIUM CHLORIDE, SODIUM LACTATE, POTASSIUM CHLORIDE, CALCIUM CHLORIDE 600; 310; 30; 20 MG/100ML; MG/100ML; MG/100ML; MG/100ML
100 INJECTION, SOLUTION INTRAVENOUS CONTINUOUS
Status: CANCELLED | OUTPATIENT
Start: 2021-07-07

## 2021-07-07 RX ORDER — CEFAZOLIN SODIUM 2 G/100ML
2 INJECTION, SOLUTION INTRAVENOUS ONCE
Status: CANCELLED | OUTPATIENT
Start: 2021-07-07 | End: 2021-07-07

## 2021-07-07 RX ORDER — SODIUM CHLORIDE 0.9 % (FLUSH) 0.9 %
10 SYRINGE (ML) INJECTION EVERY 12 HOURS SCHEDULED
Status: CANCELLED | OUTPATIENT
Start: 2021-07-07

## 2021-07-07 NOTE — PROGRESS NOTES
Chief Complaint: Hernia    Subjective         History of Present Illness  India Gaytan is a 65 y.o. female presents to [facility] to be seen for follow-up after hernia repair.  I am familiar with Ms. Gaytan after multiple surgeries and most recently a ventral hernia repair.  She reports she has been having a lot of bulging at the upper portion of her prior hernia repair.  She reports the feeling of some bloating.  She reports no overlying skin changes or signs of infection.  She has no obstructive type symptoms.  She went to her PCP with these complaints and a CT scan was obtained.  The CT shows no evidence of buddy recurrence of the hernia but some bulging in her upper abdomen.    Objective     Past Medical History:   Diagnosis Date   • Anemia    • Benign essential hypertension    • Chronic allergic rhinitis    • Essential hypertension    • Gout 05/30/2019    greatly improved   • High cholesterol    • Hyperlipidemia    • Lower extremity edema 09/18/2017    cont compression stockings can do lymphedema therapy if she wishes in the future   • Lumbar back pain with radiculopathy affecting right lower extremity 06/12/2015   • Mixed hyperlipidemia    • Osteoarthritis of right hip 06/12/2015   • Pain of right hip joint 09/18/2017    has chronic arthritis of that hip with tendonitits seen previously in MRI however she doesn't wish to do pt at this time, will try stretches that she has done in the past at home currently   • Seasonal allergies        Past Surgical History:   Procedure Laterality Date   • ABDOMINAL HYSTERECTOMY     • BACK SURGERY     • COLON SURGERY     • COLONOSCOPY  2006, 2019, 2020   • HERNIA REPAIR     • HYSTERECTOMY  1996   • OOPHORECTOMY  1999   • ROTATOR CUFF REPAIR  2011   • VERTEBROPLASTY  08/07/2020    T11         Current Outpatient Medications:   •  albuterol sulfate  (90 Base) MCG/ACT inhaler, inhale TWO puffs BY MOUTH EVERY FOUR TO SIX Hours AS  NEEDED for cough, wheeze, SHORTNESS OF BREATH. USE WITH A spacer, Disp: , Rfl:   •  azelastine (ASTELIN) 0.1 % nasal spray, SPRAY ONE TO TWO SPRAYS IN each nostril TWICE DAILY, Disp: , Rfl:   •  fenofibrate (TRICOR) 145 MG tablet, take 1 tablet (145 mg) by oral route once daily, Disp: , Rfl:   •  fluticasone (FLONASE) 50 MCG/ACT nasal spray, SPRAY TWO SPRAY IN each nostril EVERY DAY, Disp: , Rfl:   •  furosemide (LASIX) 20 MG tablet, Take 20 mg by mouth 2 (Two) Times a Day., Disp: , Rfl:   •  Insulin Pen Needle (Pen Needles) 31G X 5 MM misc, 1 each As Needed (subcu injection). To be used with Saxenda., Disp: 50 each, Rfl: 2  •  levocetirizine (XYZAL) 5 MG tablet, Take 5 mg by mouth Daily., Disp: , Rfl:   •  montelukast (SINGULAIR) 10 MG tablet, take 1 tablet (10 mg) by oral route once daily in the evening, Disp: , Rfl:   •  olmesartan-hydrochlorothiazide (BENICAR HCT) 20-12.5 MG per tablet, Take 1 tablet by mouth Daily., Disp: 90 tablet, Rfl: 0  •  Pataday 0.7 % solution, instill ONE drop IN THE affected EYE EVERY DAY, Disp: , Rfl:   •  potassium chloride (MICRO-K) 10 MEQ CR capsule, Take 1 capsule by mouth 2 (Two) Times a Day., Disp: 180 capsule, Rfl: 0  •  Liraglutide (SAXENDA) 18 MG/3ML injection pen, Inject 0.6mg under skin daily for week one, THEN 1.2mg daily for week two, THEN 1.8mg daily for week three, then 2.4mg daily for week four., Disp: 3 pen, Rfl: 0  No current facility-administered medications for this visit.    Allergies   Allergen Reactions   • Levofloxacin Itching        Family History   Problem Relation Age of Onset   • Heart disease Mother    • Heart disease Father        Social History     Socioeconomic History   • Marital status:      Spouse name: Not on file   • Number of children: Not on file   • Years of education: Not on file   • Highest education level: Not on file   Tobacco Use   • Smoking status: Never Smoker   • Smokeless tobacco: Never Used   Substance and Sexual Activity   •  Alcohol use: Yes     Comment: Rarely drinks, less than 1 drink per day   • Drug use: Never        Physical Exam  Constitutional:       Appearance: Normal appearance.   Cardiovascular:      Rate and Rhythm: Normal rate.   Pulmonary:      Effort: Pulmonary effort is normal.   Abdominal:      Palpations: Abdomen is soft.        Patient with some bulging in the upper abdomen no signs of buddy recurrence.  No overlying skin changes      Result Review :               Assessment and Plan    Diagnoses and all orders for this visit:    1. Ventral incisional hernia (Primary)        Follow Up   No follow-ups on file.  Patient was given instructions and counseling regarding her condition or for health maintenance advice. Please see specific information pulled into the AVS if appropriate.     CT shows no evidence of buddy recurrence of the hernia.  But she does have definite bulging in the upper abdomen.  I suspect that she has had her fascial closure disrupted but the intraperitoneal mesh is intact.  I feel it is likely that the intraperitoneal mesh is bulging into the open fascia.  This is not a true hernia but she is having symptoms.  Unfortunately the only way to repair this is to try and get another fascial closure.  I discussed doing this with the robot.  We would have to likely excise the previously placed mesh shortly is part of it, reattempt fascial closure I then replaced the mesh.  Risks benefits of the operation were discussed extensively including the possibility that this happens again.  All questions were answered patient does desire to proceed with operation.  We will plan for operation in the near future    Discussed with the patient - all questions were answered they voiced understanding and agreed to proceed with above plan

## 2021-07-08 NOTE — TELEPHONE ENCOUNTER
Caller: India Gaytan    Relationship: Self    Best call back number: [phone]    Medication needed:   Requested Prescriptions     Pending Prescriptions Disp Refills   • furosemide (LASIX) 20 MG tablet       Sig: Take 1 tablet by mouth 2 (Two) Times a Day.       When do you need the refill by: ASAP    What additional details did the patient provide when requesting the medication:   PHARMACY INFORMED PATIENT THAT THEY REQUESTED THIS PRESCRIPTION FOR HER AND THEY HAVE NOT RECEIVED IT YET    Does the patient have less than a 3 day supply:  [] Yes  [x] No    What is the patient's preferred pharmacy: [facility] - [place], [place] - [address] [facility] - [phone]  - [phone] FX

## 2021-07-09 ENCOUNTER — CLINICAL SUPPORT (OUTPATIENT)
Dept: INTERNAL MEDICINE | Facility: CLINIC | Age: 66
End: 2021-07-09

## 2021-07-09 DIAGNOSIS — J30.9 ALLERGIC RHINITIS, UNSPECIFIED SEASONALITY, UNSPECIFIED TRIGGER: Primary | ICD-10-CM

## 2021-07-09 PROCEDURE — 95117 IMMUNOTHERAPY INJECTIONS: CPT | Performed by: INTERNAL MEDICINE

## 2021-07-09 RX ORDER — FUROSEMIDE 20 MG/1
20 TABLET ORAL 2 TIMES DAILY
Qty: 180 TABLET | Refills: 0 | Status: SHIPPED | OUTPATIENT
Start: 2021-07-09 | End: 2021-09-29 | Stop reason: SDUPTHER

## 2021-07-14 ENCOUNTER — CLINICAL SUPPORT (OUTPATIENT)
Dept: INTERNAL MEDICINE | Facility: CLINIC | Age: 66
End: 2021-07-14

## 2021-07-14 DIAGNOSIS — J30.9 ALLERGIC RHINITIS, UNSPECIFIED SEASONALITY, UNSPECIFIED TRIGGER: Primary | ICD-10-CM

## 2021-07-14 PROCEDURE — 95117 IMMUNOTHERAPY INJECTIONS: CPT | Performed by: INTERNAL MEDICINE

## 2021-07-16 ENCOUNTER — CLINICAL SUPPORT (OUTPATIENT)
Dept: INTERNAL MEDICINE | Facility: CLINIC | Age: 66
End: 2021-07-16

## 2021-07-16 DIAGNOSIS — J30.9 ALLERGIC RHINITIS, UNSPECIFIED SEASONALITY, UNSPECIFIED TRIGGER: Primary | ICD-10-CM

## 2021-07-16 PROCEDURE — 95117 IMMUNOTHERAPY INJECTIONS: CPT | Performed by: INTERNAL MEDICINE

## 2021-07-21 ENCOUNTER — CLINICAL SUPPORT (OUTPATIENT)
Dept: INTERNAL MEDICINE | Facility: CLINIC | Age: 66
End: 2021-07-21

## 2021-07-21 DIAGNOSIS — J30.9 ALLERGIC RHINITIS, UNSPECIFIED SEASONALITY, UNSPECIFIED TRIGGER: Primary | ICD-10-CM

## 2021-07-21 PROCEDURE — 95117 IMMUNOTHERAPY INJECTIONS: CPT | Performed by: INTERNAL MEDICINE

## 2021-07-23 ENCOUNTER — CLINICAL SUPPORT (OUTPATIENT)
Dept: INTERNAL MEDICINE | Facility: CLINIC | Age: 66
End: 2021-07-23

## 2021-07-23 DIAGNOSIS — J30.9 ALLERGIC RHINITIS, UNSPECIFIED SEASONALITY, UNSPECIFIED TRIGGER: Primary | ICD-10-CM

## 2021-07-23 PROCEDURE — 95117 IMMUNOTHERAPY INJECTIONS: CPT | Performed by: INTERNAL MEDICINE

## 2021-07-27 ENCOUNTER — CLINICAL SUPPORT (OUTPATIENT)
Dept: INTERNAL MEDICINE | Facility: CLINIC | Age: 66
End: 2021-07-27

## 2021-07-27 DIAGNOSIS — J30.89 ALLERGIC RHINITIS DUE TO OTHER ALLERGIC TRIGGER, UNSPECIFIED SEASONALITY: Primary | ICD-10-CM

## 2021-07-27 PROCEDURE — 95117 IMMUNOTHERAPY INJECTIONS: CPT | Performed by: INTERNAL MEDICINE

## 2021-08-01 ENCOUNTER — ANESTHESIA EVENT (OUTPATIENT)
Dept: PERIOP | Facility: HOSPITAL | Age: 66
End: 2021-08-01

## 2021-08-01 NOTE — ANESTHESIA PREPROCEDURE EVALUATION
Anesthesia Evaluation     Patient summary reviewed and Nursing notes reviewed   no history of anesthetic complications:  NPO Solid Status: > 8 hours  NPO Liquid Status: > 2 hours           Airway   No difficulty expected  Dental      Pulmonary - negative pulmonary ROS and normal exam    breath sounds clear to auscultation  Cardiovascular - normal exam  Exercise tolerance: good (4-7 METS)    Rhythm: regular    (+) hypertension, hyperlipidemia,       Neuro/Psych- negative ROS  GI/Hepatic/Renal/Endo - negative ROS     Musculoskeletal (-) negative ROS    Abdominal    Substance History - negative use     OB/GYN negative ob/gyn ROS         Other - negative ROS                       Anesthesia Plan    ASA 2     general   (Patient understands anesthesia not responsible for dental damage.)  intravenous induction     Anesthetic plan, all risks, benefits, and alternatives have been provided, discussed and informed consent has been obtained with: patient.  Use of blood products discussed with patient .   Plan discussed with CRNA.

## 2021-08-02 ENCOUNTER — HOSPITAL ENCOUNTER (OUTPATIENT)
Facility: HOSPITAL | Age: 66
Setting detail: HOSPITAL OUTPATIENT SURGERY
Discharge: HOME | End: 2021-08-02
Attending: SURGERY | Admitting: SURGERY

## 2021-08-02 ENCOUNTER — TELEPHONE (OUTPATIENT)
Dept: SURGERY | Facility: CLINIC | Age: 66
End: 2021-08-02

## 2021-08-02 ENCOUNTER — ANESTHESIA (OUTPATIENT)
Dept: PERIOP | Facility: HOSPITAL | Age: 66
End: 2021-08-02

## 2021-08-02 VITALS
OXYGEN SATURATION: 95 % | RESPIRATION RATE: 12 BRPM | TEMPERATURE: 97.4 F | DIASTOLIC BLOOD PRESSURE: 68 MMHG | SYSTOLIC BLOOD PRESSURE: 144 MMHG | HEIGHT: 66 IN | HEART RATE: 68 BPM | WEIGHT: 234.13 LBS | BODY MASS INDEX: 37.63 KG/M2

## 2021-08-02 DIAGNOSIS — K43.2 INCISIONAL HERNIA, WITHOUT OBSTRUCTION OR GANGRENE: Primary | ICD-10-CM

## 2021-08-02 DIAGNOSIS — K43.2 VENTRAL INCISIONAL HERNIA: ICD-10-CM

## 2021-08-02 DIAGNOSIS — R11.2 NAUSEA AND VOMITING, INTRACTABILITY OF VOMITING NOT SPECIFIED, UNSPECIFIED VOMITING TYPE: Primary | ICD-10-CM

## 2021-08-02 LAB
ANION GAP SERPL CALCULATED.3IONS-SCNC: 9.9 MMOL/L (ref 5–15)
BUN SERPL-MCNC: 31 MG/DL (ref 8–23)
BUN/CREAT SERPL: 31 (ref 7–25)
CALCIUM SPEC-SCNC: 9.9 MG/DL (ref 8.6–10.5)
CHLORIDE SERPL-SCNC: 102 MMOL/L (ref 98–107)
CO2 SERPL-SCNC: 27.1 MMOL/L (ref 22–29)
CREAT SERPL-MCNC: 1 MG/DL (ref 0.57–1)
GFR SERPL CREATININE-BSD FRML MDRD: 56 ML/MIN/1.73
GLUCOSE SERPL-MCNC: 121 MG/DL (ref 65–99)
POTASSIUM SERPL-SCNC: 3.8 MMOL/L (ref 3.5–5.2)
SODIUM SERPL-SCNC: 139 MMOL/L (ref 136–145)

## 2021-08-02 PROCEDURE — 25010000003 LIDOCAINE 1 % SOLUTION 20 ML VIAL: Performed by: SURGERY

## 2021-08-02 PROCEDURE — 25010000002 HYDROMORPHONE 1 MG/ML SOLUTION: Performed by: NURSE ANESTHETIST, CERTIFIED REGISTERED

## 2021-08-02 PROCEDURE — C1889 IMPLANT/INSERT DEVICE, NOC: HCPCS | Performed by: SURGERY

## 2021-08-02 PROCEDURE — 25010000002 MIDAZOLAM PER 1MG: Performed by: ANESTHESIOLOGY

## 2021-08-02 PROCEDURE — 80048 BASIC METABOLIC PNL TOTAL CA: CPT | Performed by: ANESTHESIOLOGY

## 2021-08-02 PROCEDURE — 49656 PR LAP, RECURRENT INCISIONAL HERNIA REPAIR,REDUCIBLE: CPT | Performed by: SURGERY

## 2021-08-02 PROCEDURE — 25010000002 KETOROLAC TROMETHAMINE PER 15 MG: Performed by: NURSE ANESTHETIST, CERTIFIED REGISTERED

## 2021-08-02 PROCEDURE — 93005 ELECTROCARDIOGRAM TRACING: CPT | Performed by: ANESTHESIOLOGY

## 2021-08-02 PROCEDURE — 25010000002 ONDANSETRON PER 1 MG: Performed by: NURSE ANESTHETIST, CERTIFIED REGISTERED

## 2021-08-02 PROCEDURE — 25010000002 DEXAMETHASONE PER 1 MG: Performed by: NURSE ANESTHETIST, CERTIFIED REGISTERED

## 2021-08-02 PROCEDURE — 25010000003 CEFAZOLIN IN DEXTROSE 2-4 GM/100ML-% SOLUTION: Performed by: SURGERY

## 2021-08-02 PROCEDURE — 49656 PR LAP, RECURRENT INCISIONAL HERNIA REPAIR,REDUCIBLE: CPT | Performed by: SPECIALIST/TECHNOLOGIST, OTHER

## 2021-08-02 PROCEDURE — C1781 MESH (IMPLANTABLE): HCPCS | Performed by: SURGERY

## 2021-08-02 PROCEDURE — 25010000002 PROPOFOL 10 MG/ML EMULSION: Performed by: NURSE ANESTHETIST, CERTIFIED REGISTERED

## 2021-08-02 PROCEDURE — 25010000002 FENTANYL CITRATE (PF) 50 MCG/ML SOLUTION: Performed by: NURSE ANESTHETIST, CERTIFIED REGISTERED

## 2021-08-02 DEVICE — ABSORBABLE WOUND CLOSURE DEVICE
Type: IMPLANTABLE DEVICE | Site: ABDOMEN | Status: FUNCTIONAL
Brand: V-LOC 180

## 2021-08-02 DEVICE — VENTRALIGHT ST MESH
Type: IMPLANTABLE DEVICE | Site: ABDOMEN | Status: FUNCTIONAL
Brand: VENTRALIGHT ST

## 2021-08-02 RX ORDER — KETOROLAC TROMETHAMINE 30 MG/ML
INJECTION, SOLUTION INTRAMUSCULAR; INTRAVENOUS AS NEEDED
Status: DISCONTINUED | OUTPATIENT
Start: 2021-08-02 | End: 2021-08-02 | Stop reason: SURG

## 2021-08-02 RX ORDER — LIDOCAINE HYDROCHLORIDE 20 MG/ML
INJECTION, SOLUTION INFILTRATION; PERINEURAL AS NEEDED
Status: DISCONTINUED | OUTPATIENT
Start: 2021-08-02 | End: 2021-08-02 | Stop reason: SURG

## 2021-08-02 RX ORDER — SODIUM CHLORIDE, SODIUM LACTATE, POTASSIUM CHLORIDE, CALCIUM CHLORIDE 600; 310; 30; 20 MG/100ML; MG/100ML; MG/100ML; MG/100ML
100 INJECTION, SOLUTION INTRAVENOUS CONTINUOUS
Status: DISCONTINUED | OUTPATIENT
Start: 2021-08-02 | End: 2021-08-02 | Stop reason: HOSPADM

## 2021-08-02 RX ORDER — FENTANYL CITRATE 50 UG/ML
INJECTION, SOLUTION INTRAMUSCULAR; INTRAVENOUS AS NEEDED
Status: DISCONTINUED | OUTPATIENT
Start: 2021-08-02 | End: 2021-08-02 | Stop reason: SURG

## 2021-08-02 RX ORDER — ROCURONIUM BROMIDE 10 MG/ML
INJECTION, SOLUTION INTRAVENOUS AS NEEDED
Status: DISCONTINUED | OUTPATIENT
Start: 2021-08-02 | End: 2021-08-02 | Stop reason: SURG

## 2021-08-02 RX ORDER — SODIUM CHLORIDE 0.9 % (FLUSH) 0.9 %
10 SYRINGE (ML) INJECTION EVERY 12 HOURS SCHEDULED
Status: DISCONTINUED | OUTPATIENT
Start: 2021-08-02 | End: 2021-08-02 | Stop reason: HOSPADM

## 2021-08-02 RX ORDER — HYDROCODONE BITARTRATE AND ACETAMINOPHEN 5; 325 MG/1; MG/1
1 TABLET ORAL ONCE AS NEEDED
Status: DISCONTINUED | OUTPATIENT
Start: 2021-08-02 | End: 2021-08-02 | Stop reason: HOSPADM

## 2021-08-02 RX ORDER — HYDROCODONE BITARTRATE AND ACETAMINOPHEN 5; 325 MG/1; MG/1
1-2 TABLET ORAL EVERY 4 HOURS PRN
Qty: 20 TABLET | Refills: 0 | Status: SHIPPED | OUTPATIENT
Start: 2021-08-02 | End: 2021-10-27

## 2021-08-02 RX ORDER — DOCUSATE SODIUM 100 MG/1
100 CAPSULE, LIQUID FILLED ORAL DAILY PRN
Qty: 10 CAPSULE | Refills: 1 | Status: SHIPPED | OUTPATIENT
Start: 2021-08-02 | End: 2021-09-29

## 2021-08-02 RX ORDER — PROMETHAZINE HYDROCHLORIDE 12.5 MG/1
25 TABLET ORAL ONCE AS NEEDED
Status: DISCONTINUED | OUTPATIENT
Start: 2021-08-02 | End: 2021-08-02 | Stop reason: HOSPADM

## 2021-08-02 RX ORDER — PROPOFOL 10 MG/ML
VIAL (ML) INTRAVENOUS AS NEEDED
Status: DISCONTINUED | OUTPATIENT
Start: 2021-08-02 | End: 2021-08-02 | Stop reason: SURG

## 2021-08-02 RX ORDER — GLYCOPYRROLATE 0.2 MG/ML
INJECTION INTRAMUSCULAR; INTRAVENOUS AS NEEDED
Status: DISCONTINUED | OUTPATIENT
Start: 2021-08-02 | End: 2021-08-02 | Stop reason: SURG

## 2021-08-02 RX ORDER — OXYCODONE HYDROCHLORIDE 5 MG/1
5 TABLET ORAL
Status: DISCONTINUED | OUTPATIENT
Start: 2021-08-02 | End: 2021-08-02 | Stop reason: HOSPADM

## 2021-08-02 RX ORDER — MEPERIDINE HYDROCHLORIDE 25 MG/ML
12.5 INJECTION INTRAMUSCULAR; INTRAVENOUS; SUBCUTANEOUS
Status: DISCONTINUED | OUTPATIENT
Start: 2021-08-02 | End: 2021-08-02 | Stop reason: HOSPADM

## 2021-08-02 RX ORDER — DEXAMETHASONE SODIUM PHOSPHATE 4 MG/ML
INJECTION, SOLUTION INTRA-ARTICULAR; INTRALESIONAL; INTRAMUSCULAR; INTRAVENOUS; SOFT TISSUE AS NEEDED
Status: DISCONTINUED | OUTPATIENT
Start: 2021-08-02 | End: 2021-08-02 | Stop reason: SURG

## 2021-08-02 RX ORDER — ONDANSETRON 4 MG/1
4 TABLET, FILM COATED ORAL EVERY 8 HOURS PRN
Qty: 15 TABLET | Refills: 0 | Status: SHIPPED | OUTPATIENT
Start: 2021-08-02 | End: 2021-08-11 | Stop reason: SDUPTHER

## 2021-08-02 RX ORDER — ACETAMINOPHEN 500 MG
1000 TABLET ORAL ONCE
Status: COMPLETED | OUTPATIENT
Start: 2021-08-02 | End: 2021-08-02

## 2021-08-02 RX ORDER — ONDANSETRON 2 MG/ML
INJECTION INTRAMUSCULAR; INTRAVENOUS AS NEEDED
Status: DISCONTINUED | OUTPATIENT
Start: 2021-08-02 | End: 2021-08-02 | Stop reason: SURG

## 2021-08-02 RX ORDER — MIDAZOLAM HYDROCHLORIDE 2 MG/2ML
2 INJECTION, SOLUTION INTRAMUSCULAR; INTRAVENOUS ONCE
Status: COMPLETED | OUTPATIENT
Start: 2021-08-02 | End: 2021-08-02

## 2021-08-02 RX ORDER — SODIUM CHLORIDE, SODIUM LACTATE, POTASSIUM CHLORIDE, CALCIUM CHLORIDE 600; 310; 30; 20 MG/100ML; MG/100ML; MG/100ML; MG/100ML
9 INJECTION, SOLUTION INTRAVENOUS CONTINUOUS PRN
Status: DISCONTINUED | OUTPATIENT
Start: 2021-08-02 | End: 2021-08-02 | Stop reason: HOSPADM

## 2021-08-02 RX ORDER — CEFAZOLIN SODIUM 2 G/100ML
2 INJECTION, SOLUTION INTRAVENOUS ONCE
Status: COMPLETED | OUTPATIENT
Start: 2021-08-02 | End: 2021-08-02

## 2021-08-02 RX ORDER — PROMETHAZINE HYDROCHLORIDE 25 MG/1
25 SUPPOSITORY RECTAL ONCE AS NEEDED
Status: DISCONTINUED | OUTPATIENT
Start: 2021-08-02 | End: 2021-08-02 | Stop reason: HOSPADM

## 2021-08-02 RX ORDER — SODIUM CHLORIDE 0.9 % (FLUSH) 0.9 %
10 SYRINGE (ML) INJECTION AS NEEDED
Status: DISCONTINUED | OUTPATIENT
Start: 2021-08-02 | End: 2021-08-02 | Stop reason: HOSPADM

## 2021-08-02 RX ORDER — ONDANSETRON 2 MG/ML
4 INJECTION INTRAMUSCULAR; INTRAVENOUS ONCE AS NEEDED
Status: DISCONTINUED | OUTPATIENT
Start: 2021-08-02 | End: 2021-08-02 | Stop reason: HOSPADM

## 2021-08-02 RX ADMIN — ROCURONIUM BROMIDE 50 MG: 10 INJECTION INTRAVENOUS at 07:19

## 2021-08-02 RX ADMIN — PROPOFOL 160 MG: 10 INJECTION, EMULSION INTRAVENOUS at 07:19

## 2021-08-02 RX ADMIN — CEFAZOLIN SODIUM 2 G: 2 INJECTION, SOLUTION INTRAVENOUS at 07:16

## 2021-08-02 RX ADMIN — SODIUM CHLORIDE, POTASSIUM CHLORIDE, SODIUM LACTATE AND CALCIUM CHLORIDE: 600; 310; 30; 20 INJECTION, SOLUTION INTRAVENOUS at 08:35

## 2021-08-02 RX ADMIN — FENTANYL CITRATE 100 MCG: 50 INJECTION INTRAMUSCULAR; INTRAVENOUS at 07:19

## 2021-08-02 RX ADMIN — MIDAZOLAM HYDROCHLORIDE 2 MG: 1 INJECTION, SOLUTION INTRAMUSCULAR; INTRAVENOUS at 07:08

## 2021-08-02 RX ADMIN — FENTANYL CITRATE 50 MCG: 50 INJECTION INTRAMUSCULAR; INTRAVENOUS at 07:45

## 2021-08-02 RX ADMIN — ONDANSETRON 4 MG: 2 INJECTION INTRAMUSCULAR; INTRAVENOUS at 09:16

## 2021-08-02 RX ADMIN — HYDROCODONE BITARTRATE AND ACETAMINOPHEN 1 TABLET: 5; 325 TABLET ORAL at 11:52

## 2021-08-02 RX ADMIN — HYDROMORPHONE HYDROCHLORIDE 0.5 MG: 1 INJECTION, SOLUTION INTRAMUSCULAR; INTRAVENOUS; SUBCUTANEOUS at 09:58

## 2021-08-02 RX ADMIN — DEXAMETHASONE SODIUM PHOSPHATE 8 MG: 4 INJECTION INTRA-ARTICULAR; INTRALESIONAL; INTRAMUSCULAR; INTRAVENOUS; SOFT TISSUE at 07:25

## 2021-08-02 RX ADMIN — ACETAMINOPHEN 1000 MG: 500 TABLET ORAL at 07:08

## 2021-08-02 RX ADMIN — OXYCODONE HYDROCHLORIDE 5 MG: 5 TABLET ORAL at 10:11

## 2021-08-02 RX ADMIN — ROCURONIUM BROMIDE 10 MG: 10 INJECTION INTRAVENOUS at 09:10

## 2021-08-02 RX ADMIN — GLYCOPYRROLATE 0.2 MCG: 0.2 INJECTION INTRAMUSCULAR; INTRAVENOUS at 07:30

## 2021-08-02 RX ADMIN — FENTANYL CITRATE 50 MCG: 50 INJECTION INTRAMUSCULAR; INTRAVENOUS at 08:12

## 2021-08-02 RX ADMIN — HYDROMORPHONE HYDROCHLORIDE 0.5 MG: 1 INJECTION, SOLUTION INTRAMUSCULAR; INTRAVENOUS; SUBCUTANEOUS at 09:51

## 2021-08-02 RX ADMIN — LIDOCAINE HYDROCHLORIDE 80 MG: 20 INJECTION, SOLUTION INFILTRATION; PERINEURAL at 07:19

## 2021-08-02 RX ADMIN — SODIUM CHLORIDE, POTASSIUM CHLORIDE, SODIUM LACTATE AND CALCIUM CHLORIDE 9 ML/HR: 600; 310; 30; 20 INJECTION, SOLUTION INTRAVENOUS at 07:04

## 2021-08-02 RX ADMIN — KETOROLAC TROMETHAMINE 30 MG: 30 INJECTION, SOLUTION INTRAMUSCULAR; INTRAVENOUS at 09:21

## 2021-08-02 RX ADMIN — SUGAMMADEX 200 MG: 100 INJECTION, SOLUTION INTRAVENOUS at 09:18

## 2021-08-02 NOTE — OP NOTE
Preoperative diagnosis: Recurrent ventral hernia    Postoperative diagnosis: Same    Procedure: Robotic recurrent ventral hernia repair    Surgeon: Davis    Anesthesia: General    Assistant: Milla Lopez    EBL: 11    Specimens: None    Complications: None    Indications: 66-year-old female with bulging and pain in the upper abdomen consistent with recurrent ventral hernia    Procedures     Patient was identified in the preoperative holding area, risks, benefits, alternatives were again discussed patient voiced understanding and agreed to proceed.     Patient was brought back to the operating room, placed supine on the operating room table.  Monitoring devices Dilip stockings were placed.  After induction anesthesia patient was successfully intubated.     After intubation, patient was prepped draped in standard surgical fashion.  After prepping and draping timeout was performed to verify both correct patient and correct procedure.     We began by injecting local anesthesia in the left upper quadrant, a small incision was made to accommodate a 12 mm trocar.  The Veress needle was inserted through this incision, intra-abdominal placement was verified with a normal saline drop test.  After insufflation, we removed the Veress needle and then using the Visiport technique the abdomen was entered in the left upper quadrant.    Once the abdomen was entered we reinserted the laparoscope looked underneath her Visiport and Veress needle entry site and saw no obvious underlying injury.  We inspected the abdomen.  She had multiple adhesions to the anterior abdominal wall from her previous hernia repair.  We then went about placing her subsequent trochars.  After injection of local anesthesia and under direct visualization an 8 mm trochars placed in the left upper, left mid, and left lower abdomen.  We then brought in and docked the robot.  Once the robot was in appropriate position we began by taking down the adhesions to  the anterior abdominal wall.  These adhesions were all omental adhesions.  They came down without difficulty, we did have some small areas of bleeding.  The lysis of adhesions took about 20 to 30 minutes.  Once we had the mesh completely exposed and the anterior abdominal wall completely exposed, we were able to see that she had a recurrent hernia at the superior portion of her prior hernia repair.  The superior portion of the mesh had invaginated into the hernia.  At this point in time I felt it necessary to go ahead and remove her previously placed mesh.  We excised the mesh with blunt dissection and electrocautery it was completely removed.  The mesh was then removed from the body.  I then closed the hernia defect with multiple running 0 V-Loc sutures.  Once the hernia defects were closed, we brought in and another piece of mesh.  This mesh was a ventral light ST and based on the size of abdominal wall was cut to 24 x 16 cm.  The mesh was then sewn in circumferentially 360 degrees.  Once the mesh was completely sewn in with no gaps or folds, we inspected the abdomen.  We had no obvious signs of injury and the operative field appeared to be hemostatic.  We then remove the 12 mm trocar in that site was closed with a Javier Huffman device and 0 Vicryl suture.  The remaining trochars were removed under direct visualization and the abdomen was desufflated.    Skin incisions were closed with subcuticular 4-0 Vicryl stitch and dressed with skin affix skin glue.  Patient was then aroused anesthesia taken off the OR table and taken to PACU in stable condition.  Sponge needle instrument counts were correct x2.  Milla Lopez was present and assisted in all portions of the procedure.          Diagnoses and all orders for this visit:    1. Incisional hernia, without obstruction or gangrene (Primary)  Overview:  Followed by Dr Cannon    Orders:  -     HYDROcodone-acetaminophen (NORCO) 5-325 MG per tablet; Take 1-2 tablets by  mouth Every 4 (Four) Hours As Needed (Pain).  Dispense: 20 tablet; Refill: 0    2. Ventral incisional hernia  Overview:  Added automatically from request for surgery [id number]    Orders:  -     lactated ringers infusion  -     sodium chloride 0.9 % flush 10 mL  -     sodium chloride 0.9 % flush 10 mL  -     ceFAZolin in dextrose (ANCEF) IVPB solution 2 g    Other orders  -     Basic Metabolic Panel; Standing  -     ECG 12 Lead; Standing  -     Vital Signs - Per Anesthesia Protocol; Standing  -     Oxygen Therapy- Nasal Cannula; Titrate for SPO2: equal to or greater than, 90%; Standing  -     Pulse Oximetry, Continuous; Standing  -     Notify Anesthesiologist - Acute Changes in Patient Condition; Standing  -     Notify Anesthesiologist - Unrelieved Pain; Standing  -     Insert Peripheral IV; Standing  -     Cancel: Saline Lock & Maintain IV Access; Standing  -     sodium chloride 0.9 % flush 10 mL  -     sodium chloride 0.9 % flush 10 mL  -     lactated ringers infusion  -     acetaminophen (TYLENOL) tablet 1,000 mg  -     Midazolam HCl (PF) (VERSED) injection 2 mg  -     Follow Anesthesia Guidelines / Protocol; Standing  -     Insert Peripheral IV; Standing  -     Saline Lock & Maintain IV Access; Standing  -     Verify / Perform Chlorhexidine Skin Prep; Standing  -     Verify / Perform Chlorhexidine Skin Prep if Indicated (If Not Already Completed); Standing  -     Basic Metabolic Panel  -     ECG 12 Lead  -     Oxygen Therapy- Nasal Cannula; Titrate for SPO2: equal to or greater than, 90%  -     Pulse Oximetry, Continuous  -     Notify Anesthesiologist - Acute Changes in Patient Condition  -     Notify Anesthesiologist - Unrelieved Pain  -     Insert Peripheral IV  -     Cancel: Saline Lock & Maintain IV Access  -     Follow Anesthesia Guidelines / Protocol  -     Insert Peripheral IV  -     Saline Lock & Maintain IV Access  -     Verify / Perform Chlorhexidine Skin Prep  -     Verify / Perform Chlorhexidine Skin  Prep if Indicated (If Not Already Completed)  -     bupivacaine-EPINEPHrine PF 20 mL, lidocaine 1 % 20 mL mixture  -     Oxygen Therapy- Nasal Cannula; Titrate for SPO2: 90% - 95%; Standing  -     Pulse Oximetry, Continuous; Standing  -     POC Glucose STAT; Standing  -     Cardiac Monitoring; Standing  -     Vital Signs Every 5 Minutes for 15 Minutes, Every 15 Minutes Thereafter.; Standing  -     Apply Warming [place]; Standing  -     Suction; Standing  -     Notify Anesthesia of Any Acute Changes in Patient Condition; Standing  -     Notify Anesthesia for Unrelieved Pain; Standing  -     oxyCODONE (ROXICODONE) immediate release tablet 5 mg  -     HYDROmorphone (DILAUDID) injection 0.25 mg  -     HYDROmorphone (DILAUDID) injection 0.5 mg  -     ondansetron (ZOFRAN) injection 4 mg  -     promethazine (PHENERGAN) suppository 25 mg  -     promethazine (PHENERGAN) tablet 25 mg  -     meperidine (DEMEROL) injection 12.5 mg  -     Once Discharge Criteria to Floor Met, Follow Surgeon Orders; Standing  -     Discharge Patient From PACU When Discharge Criteria Met; Standing  -     Discharge patient; Standing  -     Discharge to Care of  When Patient Meets Criteria; Standing  -     Activity As Tolerated; Standing  -     Vital Signs; Standing  -     HYDROcodone-acetaminophen (NORCO) 5-325 MG per tablet 1 tablet  -     Discharge Follow-up with Specified Provider: Davis; 2 Weeks  -     docusate sodium (Colace) 100 MG capsule; Take 1 capsule by mouth Daily As Needed for Constipation.  Dispense: 10 capsule; Refill: 1  -     Maintain IV Access Until Discharge; Standing  -     Other Instructions:; Standing  -     Ambulate Patient Prior to Discharge; Standing  -     Give Patient Post-Op Discharge Instructions Per Surgeon; Standing  -     Notify Physician or Go To The ED For the Following Conditions  -     Other Restrictions (Specify)  -     Wound Care  -     Discharge patient  -     Discharge to Care of  When  Patient Meets Criteria            This document has been electronically signed by Eliseo Cannon MD  August 2, 2021 09:42 EDT

## 2021-08-02 NOTE — DISCHARGE INSTRUCTIONS
DISCHARGE INSTRUCTIONS  HERNIA      ? For your surgery you had:  ? General anesthesia (you may have a sore throat for the first 24 hours)    ? You received a medicated patch for nausea prevention today (behind your ear). It is recommended that you remove it 24-48 hours post-operatively. It must be removed within 72 hours.   ? You received an anesthesia medication today that can cause hormonal forms of birth control to be ineffective. You should use a different form of birth control (to prevent pregnancy) for 7 days.  ? You may experience dizziness, drowsiness, or light-headedness for several hours following surgery/procedure.  ? Do not stay alone today or tonight.  ? Limit your activity for 24 hours.  ? Resume your diet slowly.  Follow whatever special dietary instructions you may have been given by your doctor.  ? You should not drive, operate machinery, drink alcohol, or sign legally binding documents for 24 hours or while you are taking pain medication.  Last dose of pain medication was given at: 1152  .  NOTIFY YOUR DOCTOR IF YOU EXPERIENCE ANY OF THE FOLLOWING:  ? Temperature greater than 101 degrees Fahrenheit  ? Shaking Chills  ? Redness or excessive drainage from incision  ? Nausea, vomiting and/or pain that is not controlled by prescribed medications  ? Increase in bleeding or bleeding that is excessive  ? Unable to urinate in 6 hours after surgery  ? If unable to reach your doctor, please go to the closest Emergency Room [] You may remove dressing:      [] Other: leave skin glue in place   [x] You may shower or bathe: 24 hours   ? Apply an ice pack for 24-48 hours.  ? Do not do any heavy lifting, pushing or pulling.  ? You may walk up and down stairs.  ? You may ride in a car but do not drive until instructed by your physician.  ? Avoid constipation.  ? If unable to urinate in 6 to 8 hours after surgery or urinating frequently in small amounts, notify your doctor or go to the nearest Emergency  Room.  ? Medications per physician instructions as indicated on Discharge Medication Information Sheet.  You should see   for follow-up care   on call office tomorrow for 2 week follow up  .  Phone number:  [phone]     SPECIAL INSTRUCTIONS:                 I have read and received the above instructions.     Patient/Responsible Party's Signature Date/Time     RN Signature Date/Time

## 2021-08-02 NOTE — BRIEF OP NOTE
VENTRAL HERNIA REPAIR LAPAROSCOPIC WITH DAVINCI ROBOT  Progress Note    India Gaytan  8/2/2021    Pre-op Diagnosis:   Ventral incisional hernia [K43.2]- recurrent       Post-Op Diagnosis Codes:     * Ventral incisional hernia [K43.2]    Procedure/CPT® Codes:        Procedure(s):  VENTRAL HERNIA REPAIR LAPAROSCOPIC WITH DAVINCI ROBOT    Surgeon(s):  Eliseo Cannon MD    Anesthesia: General    Staff:   Circulator: Paula Chan RN  Scrub Person: Yaniv Harvey  Assistant: Milla Lopez CSA  Other: Yadiel Real RN  Assistant: Milla Lopez CSA      Estimated Blood Loss: 11    Urine Voided: * No values recorded between 8/2/2021  7:16 AM and 8/2/2021  9:29 AM *    Specimens:                None          Drains: * No LDAs found *    Findings: Recurrent ventral hernia    Complications: None    Assistant: Milla Lopez CSA  was responsible for performing the following activities: Retraction, Suction, Irrigation, Suturing, Closing, Placing Dressing and Held/Positioned Camera and their skilled assistance was necessary for the success of this case.    Eliseo Cannon MD     Date: 8/2/2021  Time: 09:35 EDT

## 2021-08-02 NOTE — ANESTHESIA POSTPROCEDURE EVALUATION
Patient: India Gaytan    Procedure Summary     Date: 08/02/21 Room / Location: [facility] OR 08 / [facility] MAIN OR    Anesthesia Start: 0716 Anesthesia Stop: 0944    Procedure: VENTRAL HERNIA REPAIR LAPAROSCOPIC WITH DAVINCI ROBOT (N/A Abdomen) Diagnosis:       Ventral incisional hernia      (Ventral incisional hernia [K43.2])    Surgeons: Eliseo Cannon MD Provider: Goldberg, Michael, MD    Anesthesia Type: general ASA Status: 2          Anesthesia Type: general    Vitals  Vitals Value Taken Time   /69 08/02/21 0946   Temp 36.6 °C (97.9 °F) 08/02/21 0938   Pulse 86 08/02/21 0948   Resp 18 08/02/21 0938   SpO2 99 % 08/02/21 0948   Vitals shown include unvalidated device data.        Post Anesthesia Care and Evaluation    Patient location during evaluation: bedside  Patient participation: complete - patient participated  Level of consciousness: awake  Pain score: 0  Pain management: adequate  Airway patency: patent  Anesthetic complications: No anesthetic complications  PONV Status: none  Cardiovascular status: acceptable and stable  Respiratory status: acceptable and room air  Hydration status: acceptable    Comments: An Anesthesiologist personally participated in the most demanding procedures (including induction and emergence if applicable) in the anesthesia plan, monitored the course of anesthesia administration at frequent intervals and remained physically present and available for immediate diagnosis and treatment of emergencies.

## 2021-08-02 NOTE — ADDENDUM NOTE
Addendum  created 08/02/21 0954 by Goldberg, Michael, MD    Attestation recorded in Intraprocedure, Intraprocedure Attestations filed

## 2021-08-02 NOTE — TELEPHONE ENCOUNTER
Patient daughter called asking for nausea medication for patient please. Pharmacy correct in chart.

## 2021-08-06 ENCOUNTER — CLINICAL SUPPORT (OUTPATIENT)
Dept: INTERNAL MEDICINE | Facility: CLINIC | Age: 66
End: 2021-08-06

## 2021-08-06 DIAGNOSIS — J30.89 ALLERGIC RHINITIS DUE TO OTHER ALLERGIC TRIGGER, UNSPECIFIED SEASONALITY: Primary | ICD-10-CM

## 2021-08-06 PROCEDURE — 95117 IMMUNOTHERAPY INJECTIONS: CPT | Performed by: INTERNAL MEDICINE

## 2021-08-08 ENCOUNTER — HOSPITAL ENCOUNTER (EMERGENCY)
Facility: HOSPITAL | Age: 66
Discharge: LEFT BEFORE DISCHARGE | End: 2021-08-08

## 2021-08-08 VITALS
BODY MASS INDEX: 36.26 KG/M2 | RESPIRATION RATE: 18 BRPM | WEIGHT: 231.04 LBS | HEART RATE: 57 BPM | DIASTOLIC BLOOD PRESSURE: 59 MMHG | HEIGHT: 67 IN | TEMPERATURE: 98.5 F | OXYGEN SATURATION: 98 % | SYSTOLIC BLOOD PRESSURE: 147 MMHG

## 2021-08-08 PROCEDURE — 99211 OFF/OP EST MAY X REQ PHY/QHP: CPT

## 2021-08-08 RX ORDER — SODIUM CHLORIDE 0.9 % (FLUSH) 0.9 %
10 SYRINGE (ML) INJECTION AS NEEDED
Status: DISCONTINUED | OUTPATIENT
Start: 2021-08-08 | End: 2021-08-08 | Stop reason: HOSPADM

## 2021-08-08 RX ORDER — SODIUM CHLORIDE 0.9 % (FLUSH) 0.9 %
10 SYRINGE (ML) INJECTION AS NEEDED
Status: DISCONTINUED | OUTPATIENT
Start: 2021-08-08 | End: 2021-08-08

## 2021-08-09 ENCOUNTER — TELEPHONE (OUTPATIENT)
Dept: INTERNAL MEDICINE | Facility: CLINIC | Age: 66
End: 2021-08-09

## 2021-08-09 ENCOUNTER — HOSPITAL ENCOUNTER (OUTPATIENT)
Dept: CT IMAGING | Facility: HOSPITAL | Age: 66
Discharge: HOME | End: 2021-08-09
Admitting: NURSE PRACTITIONER

## 2021-08-09 ENCOUNTER — HOSPITAL ENCOUNTER (EMERGENCY)
Facility: HOSPITAL | Age: 66
Discharge: HOME | End: 2021-08-10
Attending: EMERGENCY MEDICINE | Admitting: EMERGENCY MEDICINE

## 2021-08-09 ENCOUNTER — TELEPHONE (OUTPATIENT)
Dept: SURGERY | Facility: CLINIC | Age: 66
End: 2021-08-09

## 2021-08-09 DIAGNOSIS — R10.9 ABDOMINAL PAIN, UNSPECIFIED ABDOMINAL LOCATION: ICD-10-CM

## 2021-08-09 DIAGNOSIS — K29.80 DUODENITIS: Primary | ICD-10-CM

## 2021-08-09 DIAGNOSIS — Z98.890 S/P REPAIR OF VENTRAL HERNIA: Primary | ICD-10-CM

## 2021-08-09 DIAGNOSIS — Z87.19 S/P REPAIR OF VENTRAL HERNIA: Primary | ICD-10-CM

## 2021-08-09 DIAGNOSIS — R11.2 NON-INTRACTABLE VOMITING WITH NAUSEA, UNSPECIFIED VOMITING TYPE: ICD-10-CM

## 2021-08-09 DIAGNOSIS — R11.2 NAUSEA AND VOMITING, INTRACTABILITY OF VOMITING NOT SPECIFIED, UNSPECIFIED VOMITING TYPE: ICD-10-CM

## 2021-08-09 LAB
BACTERIA UR QL AUTO: ABNORMAL /HPF
BILIRUB UR QL STRIP: NEGATIVE
CLARITY UR: CLEAR
COLOR UR: YELLOW
GLUCOSE UR STRIP-MCNC: NEGATIVE MG/DL
HGB UR QL STRIP.AUTO: NEGATIVE
HYALINE CASTS UR QL AUTO: ABNORMAL /LPF
KETONES UR QL STRIP: ABNORMAL
LEUKOCYTE ESTERASE UR QL STRIP.AUTO: ABNORMAL
MUCOUS THREADS URNS QL MICRO: ABNORMAL /HPF
NITRITE UR QL STRIP: NEGATIVE
PH UR STRIP.AUTO: 5.5 [PH] (ref 5–8)
PROT UR QL STRIP: NEGATIVE
RBC # UR: ABNORMAL /HPF
REF LAB TEST METHOD: ABNORMAL
SP GR UR STRIP: >=1.03 (ref 1–1.03)
SQUAMOUS #/AREA URNS HPF: ABNORMAL /HPF
UROBILINOGEN UR QL STRIP: ABNORMAL
WBC UR QL AUTO: ABNORMAL /HPF

## 2021-08-09 PROCEDURE — 81001 URINALYSIS AUTO W/SCOPE: CPT

## 2021-08-09 PROCEDURE — 96374 THER/PROPH/DIAG INJ IV PUSH: CPT

## 2021-08-09 PROCEDURE — 96375 TX/PRO/DX INJ NEW DRUG ADDON: CPT

## 2021-08-09 PROCEDURE — 99283 EMERGENCY DEPT VISIT LOW MDM: CPT

## 2021-08-09 PROCEDURE — 74176 CT ABD & PELVIS W/O CONTRAST: CPT

## 2021-08-09 RX ORDER — ONDANSETRON 2 MG/ML
4 INJECTION INTRAMUSCULAR; INTRAVENOUS ONCE
Status: COMPLETED | OUTPATIENT
Start: 2021-08-09 | End: 2021-08-10

## 2021-08-09 RX ORDER — SODIUM CHLORIDE 0.9 % (FLUSH) 0.9 %
10 SYRINGE (ML) INJECTION AS NEEDED
Status: DISCONTINUED | OUTPATIENT
Start: 2021-08-09 | End: 2021-08-10 | Stop reason: HOSPADM

## 2021-08-09 NOTE — TELEPHONE ENCOUNTER
April has ordered  Patient a Ct Scan ab and pelvis W & W/O Contrast. CT Scheduled today @[facility] @1:15pm Arrive @12:15pm spoke to elli. Patient has been notified and auth#[id number] good from 8/9/21 to 9/7/21 given to john in scheduling.

## 2021-08-09 NOTE — TELEPHONE ENCOUNTER
Dora ANTONIO already placed orders for  A CT abd with and without contrast pelvis and without contrast. Wanted you aware and what we needed to do or follow up with patient.  Please advise.

## 2021-08-09 NOTE — TELEPHONE ENCOUNTER
I have called and spoke with this patient and informed her that her ct scan showed some thickening of duodenum. Nothing r/t to her hernia repair. I offered an appt. With Dr. cannon for tomorrow and patient stated she could not wait that long. I instructed her to proceed to the   ER, if she feels like she cant wait till tomorrow to see Dr. Cannon. Patient verbalizes understanding.

## 2021-08-09 NOTE — TELEPHONE ENCOUNTER
Hernia repair 08/02.  Nausea and vomiting the past 3 - 4 days.  Hard to eat anything as she vomit, and vomits liquids. Chills and shaking. Incisions look fine.  Able to have bowel movements.  Went to ER at [facility] last night, and left after 6 hours.  Taking Zofran, but it only helps a short while.

## 2021-08-09 NOTE — TELEPHONE ENCOUNTER
Patient called and said she is still having vomiting and nausea, she had talked to you over the weekend after sitting in the ED for a prolonged period of time. She has placed a call into Dr. Cannon's office also. Please advise what CT you are wanting for this patient or if you are wanting to wait and see what Dr. Cannon says. Please advise.

## 2021-08-09 NOTE — TELEPHONE ENCOUNTER
Caller: PRADEEP RAMÍREZ    Relationship to patient: Emergency Contact    Best call back number: [phone]  Patient is needing: TO BE SEEN AS SOON AS POSSIBLE.  DAUGHTER CALLED AND FEELS LIKE SHE IS EXTREMELY DEHYDRATED.  WAITED AT ER YESTERDAY FOR 6 HOURS AND NEVER WAS SEEN.

## 2021-08-10 ENCOUNTER — TELEPHONE (OUTPATIENT)
Dept: INTERNAL MEDICINE | Facility: CLINIC | Age: 66
End: 2021-08-10

## 2021-08-10 VITALS
SYSTOLIC BLOOD PRESSURE: 134 MMHG | TEMPERATURE: 99.3 F | HEART RATE: 55 BPM | BODY MASS INDEX: 36.02 KG/M2 | WEIGHT: 229.5 LBS | RESPIRATION RATE: 18 BRPM | HEIGHT: 67 IN | DIASTOLIC BLOOD PRESSURE: 64 MMHG | OXYGEN SATURATION: 95 %

## 2021-08-10 LAB
ALBUMIN SERPL-MCNC: 4 G/DL (ref 3.5–5.2)
ALBUMIN/GLOB SERPL: 1.3 G/DL
ALP SERPL-CCNC: 74 U/L (ref 39–117)
ALT SERPL W P-5'-P-CCNC: 28 U/L (ref 1–33)
ANION GAP SERPL CALCULATED.3IONS-SCNC: 11.5 MMOL/L (ref 5–15)
AST SERPL-CCNC: 23 U/L (ref 1–32)
BASOPHILS # BLD AUTO: 0.03 10*3/MM3 (ref 0–0.2)
BASOPHILS NFR BLD AUTO: 0.3 % (ref 0–1.5)
BILIRUB SERPL-MCNC: 0.2 MG/DL (ref 0–1.2)
BUN SERPL-MCNC: 23 MG/DL (ref 8–23)
BUN/CREAT SERPL: 28 (ref 7–25)
CALCIUM SPEC-SCNC: 9.4 MG/DL (ref 8.6–10.5)
CHLORIDE SERPL-SCNC: 103 MMOL/L (ref 98–107)
CO2 SERPL-SCNC: 25.5 MMOL/L (ref 22–29)
CREAT SERPL-MCNC: 0.82 MG/DL (ref 0.57–1)
DEPRECATED RDW RBC AUTO: 45.2 FL (ref 37–54)
EOSINOPHIL # BLD AUTO: 0.45 10*3/MM3 (ref 0–0.4)
EOSINOPHIL NFR BLD AUTO: 4.3 % (ref 0.3–6.2)
ERYTHROCYTE [DISTWIDTH] IN BLOOD BY AUTOMATED COUNT: 14.6 % (ref 12.3–15.4)
GFR SERPL CREATININE-BSD FRML MDRD: 70 ML/MIN/1.73
GLOBULIN UR ELPH-MCNC: 3 GM/DL
GLUCOSE SERPL-MCNC: 103 MG/DL (ref 65–99)
HCT VFR BLD AUTO: 34.2 % (ref 34–46.6)
HGB BLD-MCNC: 11.1 G/DL (ref 12–15.9)
HOLD SPECIMEN: NORMAL
HOLD SPECIMEN: NORMAL
IMM GRANULOCYTES # BLD AUTO: 0.05 10*3/MM3 (ref 0–0.05)
IMM GRANULOCYTES NFR BLD AUTO: 0.5 % (ref 0–0.5)
LIPASE SERPL-CCNC: 23 U/L (ref 13–60)
LYMPHOCYTES # BLD AUTO: 2.23 10*3/MM3 (ref 0.7–3.1)
LYMPHOCYTES NFR BLD AUTO: 21.5 % (ref 19.6–45.3)
MCH RBC QN AUTO: 27.6 PG (ref 26.6–33)
MCHC RBC AUTO-ENTMCNC: 32.5 G/DL (ref 31.5–35.7)
MCV RBC AUTO: 85.1 FL (ref 79–97)
MONOCYTES # BLD AUTO: 0.75 10*3/MM3 (ref 0.1–0.9)
MONOCYTES NFR BLD AUTO: 7.2 % (ref 5–12)
NEUTROPHILS NFR BLD AUTO: 6.84 10*3/MM3 (ref 1.7–7)
NEUTROPHILS NFR BLD AUTO: 66.2 % (ref 42.7–76)
NRBC BLD AUTO-RTO: 0 /100 WBC (ref 0–0.2)
PLATELET # BLD AUTO: 420 10*3/MM3 (ref 140–450)
PMV BLD AUTO: 9.2 FL (ref 6–12)
POTASSIUM SERPL-SCNC: 3.9 MMOL/L (ref 3.5–5.2)
PROT SERPL-MCNC: 7 G/DL (ref 6–8.5)
QT INTERVAL: 422 MS
RBC # BLD AUTO: 4.02 10*6/MM3 (ref 3.77–5.28)
SODIUM SERPL-SCNC: 140 MMOL/L (ref 136–145)
WBC # BLD AUTO: 10.35 10*3/MM3 (ref 3.4–10.8)
WHOLE BLOOD HOLD SPECIMEN: NORMAL

## 2021-08-10 PROCEDURE — 80053 COMPREHEN METABOLIC PANEL: CPT | Performed by: EMERGENCY MEDICINE

## 2021-08-10 PROCEDURE — 25010000002 DIPHENHYDRAMINE PER 50 MG: Performed by: NURSE PRACTITIONER

## 2021-08-10 PROCEDURE — 25010000002 DROPERIDOL PER 5 MG: Performed by: NURSE PRACTITIONER

## 2021-08-10 PROCEDURE — 85025 COMPLETE CBC W/AUTO DIFF WBC: CPT | Performed by: EMERGENCY MEDICINE

## 2021-08-10 PROCEDURE — 25010000002 ONDANSETRON PER 1 MG: Performed by: NURSE PRACTITIONER

## 2021-08-10 PROCEDURE — 83690 ASSAY OF LIPASE: CPT | Performed by: EMERGENCY MEDICINE

## 2021-08-10 RX ORDER — DIPHENHYDRAMINE HYDROCHLORIDE 50 MG/ML
25 INJECTION INTRAMUSCULAR; INTRAVENOUS ONCE
Status: COMPLETED | OUTPATIENT
Start: 2021-08-10 | End: 2021-08-10

## 2021-08-10 RX ORDER — FAMOTIDINE 10 MG/ML
20 INJECTION, SOLUTION INTRAVENOUS ONCE
Status: COMPLETED | OUTPATIENT
Start: 2021-08-10 | End: 2021-08-10

## 2021-08-10 RX ORDER — DROPERIDOL 2.5 MG/ML
2.5 INJECTION, SOLUTION INTRAMUSCULAR; INTRAVENOUS ONCE
Status: COMPLETED | OUTPATIENT
Start: 2021-08-10 | End: 2021-08-10

## 2021-08-10 RX ORDER — SUCRALFATE 1 G/1
1 TABLET ORAL ONCE
Status: COMPLETED | OUTPATIENT
Start: 2021-08-10 | End: 2021-08-10

## 2021-08-10 RX ORDER — PROMETHAZINE HYDROCHLORIDE 25 MG/1
25 TABLET ORAL EVERY 6 HOURS PRN
Qty: 15 TABLET | Refills: 0 | Status: SHIPPED | OUTPATIENT
Start: 2021-08-10 | End: 2021-09-29

## 2021-08-10 RX ORDER — PANTOPRAZOLE SODIUM 20 MG/1
20 TABLET, DELAYED RELEASE ORAL DAILY
Qty: 30 TABLET | Refills: 0 | Status: SHIPPED | OUTPATIENT
Start: 2021-08-10 | End: 2021-08-11 | Stop reason: SDUPTHER

## 2021-08-10 RX ORDER — FAMOTIDINE 40 MG/1
40 TABLET, FILM COATED ORAL DAILY
Qty: 30 TABLET | Refills: 0 | Status: SHIPPED | OUTPATIENT
Start: 2021-08-10 | End: 2021-09-29

## 2021-08-10 RX ADMIN — SUCRALFATE 1 G: 1 TABLET ORAL at 02:07

## 2021-08-10 RX ADMIN — SODIUM CHLORIDE 1000 ML: 9 INJECTION, SOLUTION INTRAVENOUS at 00:33

## 2021-08-10 RX ADMIN — FAMOTIDINE 20 MG: 10 INJECTION INTRAVENOUS at 00:44

## 2021-08-10 RX ADMIN — DIPHENHYDRAMINE HYDROCHLORIDE 25 MG: 50 INJECTION, SOLUTION INTRAMUSCULAR; INTRAVENOUS at 01:03

## 2021-08-10 RX ADMIN — ONDANSETRON 4 MG: 2 INJECTION INTRAMUSCULAR; INTRAVENOUS at 00:33

## 2021-08-10 RX ADMIN — DROPERIDOL 2.5 MG: 2.5 INJECTION, SOLUTION INTRAMUSCULAR; INTRAVENOUS at 01:01

## 2021-08-10 NOTE — TELEPHONE ENCOUNTER
Patient called and had CT done yesterday but still having nausea and vomiting. Was in the ED last night and they said she needed to follow up with you on the potential of of a an ulcer and get started on medications. Is there a time or day you can squeeze her in for a telehealth or do you want to just start meds? Please advise.

## 2021-08-10 NOTE — ED PROVIDER NOTES
Subjective   The patient presents to the emergency department complaining of vomiting she states she has been doing for 3 days.  She states that she had a ventral hernia repair done on August 2.  She states that she was told that the surgery went well and she states for the first few days she was recovering okay.  She states after that she started having vomiting.  She states today that she started having pain in her mid abdomen.  She reports that she has had no recent fevers.  She does have some tenderness with palpation with no rebound or guarding.  She denies any urinary symptoms.  She states that Dr. Cannon was who done her surgery on the second and he sent her for CAT scan today outpatient.  She denies any blood or mucus in her stools.            Review of Systems   Constitutional: Negative for chills and fever.   HENT: Negative for congestion, ear pain and sore throat.    Eyes: Negative for pain.   Respiratory: Negative for cough, chest tightness and shortness of breath.    Cardiovascular: Negative for chest pain.   Gastrointestinal: Positive for abdominal pain, nausea and vomiting. Negative for diarrhea.   Genitourinary: Negative for flank pain and hematuria.   Musculoskeletal: Negative for joint swelling.   Skin: Negative for pallor.   Neurological: Negative for seizures and headaches.   All other systems reviewed and are negative.      Past Medical History:   Diagnosis Date   • Anemia     ASYMPTOMATIC   • Anesthesia     SLOW TO WAKE UP POSTOP    • Benign essential hypertension    • Chronic allergic rhinitis    • Essential hypertension    • Gout 05/30/2019    greatly improved   • High cholesterol    • Hyperlipidemia    • Lower extremity edema 09/18/2017    cont compression stockings can do lymphedema therapy if she wishes in the future   • Lumbar back pain with radiculopathy affecting right lower extremity 06/12/2015   • Mixed hyperlipidemia    • Osteoarthritis of right hip 06/12/2015   • Pain of right  hip joint 09/18/2017    has chronic arthritis of that hip with tendonitits seen previously in MRI however she doesn't wish to do pt at this time, will try stretches that she has done in the past at home currently   • Seasonal allergies    • Ventral hernia CURRENTLY       Allergies   Allergen Reactions   • Levofloxacin Nausea And Vomiting     Pt reported also caused her to pass out        Past Surgical History:   Procedure Laterality Date   • ABDOMINAL HYSTERECTOMY     • BACK SURGERY     • COLON SURGERY      RELATED TO ABSCESS   • COLONOSCOPY  2006, 2019, 2020   • HERNIA REPAIR  12/2020    VENTRAL HERNIA    • HYSTERECTOMY  1996   • OOPHORECTOMY  1999   • ROTATOR CUFF REPAIR Left 2011   • VENTRAL HERNIA REPAIR N/A 8/2/2021    Procedure: VENTRAL HERNIA REPAIR LAPAROSCOPIC WITH [employer] ROBOT;  Surgeon: Eliseo Cannon MD;  Location: [facility] MAIN OR;  Service: Robotics - DaVinci;  Laterality: N/A;   • VERTEBROPLASTY  08/07/2020    T11       Family History   Problem Relation Age of Onset   • Heart disease Mother    • Heart disease Father        Social History     Socioeconomic History   • Marital status:      Spouse name: Not on file   • Number of children: Not on file   • Years of education: Not on file   • Highest education level: Not on file   Tobacco Use   • Smoking status: Never Smoker   • Smokeless tobacco: Never Used   Vaping Use   • Vaping Use: Never used   Substance and Sexual Activity   • Alcohol use: Yes     Comment: Rarely drinks   • Drug use: Never   • Sexual activity: Defer           Objective   Physical Exam  Vitals and nursing note reviewed.   Constitutional:       General: She is not in acute distress.     Appearance: Normal appearance. She is not toxic-appearing.   HENT:      Head: Normocephalic and atraumatic.   Cardiovascular:      Rate and Rhythm: Normal rate and regular rhythm.      Pulses: Normal pulses.   Pulmonary:      Effort: Pulmonary effort is normal. No respiratory distress.    Abdominal:      General: Abdomen is flat.      Palpations: Abdomen is soft.      Tenderness: There is abdominal tenderness.   Musculoskeletal:         General: Normal range of motion.      Cervical back: Normal range of motion and neck supple.   Skin:     General: Skin is warm and dry.      Capillary Refill: Capillary refill takes less than 2 seconds.   Neurological:      General: No focal deficit present.      Mental Status: She is alert and oriented to person, place, and time. Mental status is at baseline.         Procedures           ED Course  ED Course as of Aug 10 0228   Tue Aug 10, 2021   0036 I reviewed the patient's CT scan from her outpatient CT today.  It shows that she has gallstones and some thickening to the duodenal wall that could either be duodenitis or possibly peptic ulcer disease.    [TC]   0112 The patient reports she is feeling much better since her medications.  She has had no episodes of vomiting.  She is requesting something other than Zofran for nausea at home and a work note for the remaining part of the week.  She states that she will follow up with Dr. Jones concerning her symptoms tonight Dr. Cannon as scheduled    [TC]      ED Course User Index  [TC] Chitra Pack, MARISELA                                           MDM  Number of Diagnoses or Management Options  Duodenitis: new and requires workup  Non-intractable vomiting with nausea, unspecified vomiting type: new and requires workup     Amount and/or Complexity of Data Reviewed  Clinical lab tests: reviewed    Risk of Complications, Morbidity, and/or Mortality  Presenting problems: low  Diagnostic procedures: low  Management options: low    Patient Progress  Patient progress: stable      Final diagnoses:   Duodenitis   Non-intractable vomiting with nausea, unspecified vomiting type       ED Disposition  ED Disposition     ED Disposition Condition Comment    Discharge Stable           Lisa Jones MD  [address] [facility]  [place]  [address]  [facility] [address]  [phone]    In 2 days  FOR FOLLOW UP    Eliseo Cannon MD  [address]  [place] [address] [phone]      AS SCHEDULED         Medication List      New Prescriptions    famotidine 40 MG tablet  Commonly known as: PEPCID  Take 1 tablet by mouth Daily.     pantoprazole 20 MG EC tablet  Commonly known as: PROTONIX  Take 1 tablet by mouth Daily.     promethazine 25 MG tablet  Commonly known as: PHENERGAN  Take 1 tablet by mouth Every 6 (Six) Hours As Needed for Nausea or Vomiting.        Changed    furosemide 20 MG tablet  Commonly known as: LASIX  Take 1 tablet by mouth 2 (Two) Times a Day for 90 days.  What changed:   · when to take this  · additional instructions     Liraglutide 18 MG/3ML injection pen  Commonly known as: SAXENDA  Inject 0.6mg under skin daily for week one, THEN 1.2mg daily for week two, THEN 1.8mg daily for week three, then 2.4mg daily for week four.  What changed:   · how much to take  · additional instructions           Where to Get Your Medications      These medications were sent to [facility] - [place], [place] - [address] - [phone]  - [phone] FX  [address] [facility] [place] [address]    Phone: [phone]   · famotidine 40 MG tablet  · pantoprazole 20 MG EC tablet  · promethazine 25 MG tablet          Chitra Pack APRN  08/10/21 0228

## 2021-08-11 ENCOUNTER — TELEMEDICINE (OUTPATIENT)
Dept: INTERNAL MEDICINE | Facility: CLINIC | Age: 66
End: 2021-08-11

## 2021-08-11 DIAGNOSIS — R10.9 ABDOMINAL PAIN, UNSPECIFIED ABDOMINAL LOCATION: Primary | ICD-10-CM

## 2021-08-11 DIAGNOSIS — R11.2 NAUSEA AND VOMITING, INTRACTABILITY OF VOMITING NOT SPECIFIED, UNSPECIFIED VOMITING TYPE: ICD-10-CM

## 2021-08-11 PROCEDURE — 99213 OFFICE O/P EST LOW 20 MIN: CPT | Performed by: INTERNAL MEDICINE

## 2021-08-11 RX ORDER — PANTOPRAZOLE SODIUM 40 MG/1
40 TABLET, DELAYED RELEASE ORAL DAILY
Qty: 90 TABLET | Refills: 0 | Status: SHIPPED | OUTPATIENT
Start: 2021-08-11 | End: 2021-09-29 | Stop reason: SDUPTHER

## 2021-08-11 RX ORDER — ONDANSETRON 4 MG/1
4 TABLET, FILM COATED ORAL EVERY 8 HOURS PRN
Qty: 15 TABLET | Refills: 0 | Status: SHIPPED | OUTPATIENT
Start: 2021-08-11 | End: 2021-09-29

## 2021-08-11 RX ORDER — SUCRALFATE 1 G/1
1 TABLET ORAL 4 TIMES DAILY
Qty: 120 TABLET | Refills: 0 | Status: SHIPPED | OUTPATIENT
Start: 2021-08-11 | End: 2021-09-29

## 2021-08-13 ENCOUNTER — CLINICAL SUPPORT (OUTPATIENT)
Dept: INTERNAL MEDICINE | Facility: CLINIC | Age: 66
End: 2021-08-13

## 2021-08-13 DIAGNOSIS — J30.9 ALLERGIC RHINITIS, UNSPECIFIED SEASONALITY, UNSPECIFIED TRIGGER: Primary | ICD-10-CM

## 2021-08-13 PROCEDURE — 95117 IMMUNOTHERAPY INJECTIONS: CPT | Performed by: INTERNAL MEDICINE

## 2021-08-16 ENCOUNTER — OFFICE VISIT (OUTPATIENT)
Dept: INTERNAL MEDICINE | Facility: CLINIC | Age: 66
End: 2021-08-16

## 2021-08-16 ENCOUNTER — HOSPITAL ENCOUNTER (OUTPATIENT)
Dept: CT IMAGING | Facility: HOSPITAL | Age: 66
Discharge: HOME | End: 2021-08-16
Admitting: INTERNAL MEDICINE

## 2021-08-16 VITALS
DIASTOLIC BLOOD PRESSURE: 78 MMHG | HEART RATE: 77 BPM | WEIGHT: 229 LBS | BODY MASS INDEX: 35.94 KG/M2 | OXYGEN SATURATION: 96 % | SYSTOLIC BLOOD PRESSURE: 142 MMHG | TEMPERATURE: 97.6 F | HEIGHT: 67 IN

## 2021-08-16 DIAGNOSIS — R10.84 GENERALIZED ABDOMINAL PAIN: Primary | ICD-10-CM

## 2021-08-16 DIAGNOSIS — R10.84 GENERALIZED ABDOMINAL PAIN: ICD-10-CM

## 2021-08-16 LAB
BASOPHILS # BLD AUTO: 0.04 10*3/MM3 (ref 0–0.2)
BASOPHILS NFR BLD AUTO: 0.6 % (ref 0–1.5)
DEPRECATED RDW RBC AUTO: 52.2 FL (ref 37–54)
EOSINOPHIL # BLD AUTO: 0.6 10*3/MM3 (ref 0–0.4)
EOSINOPHIL NFR BLD AUTO: 8.3 % (ref 0.3–6.2)
ERYTHROCYTE [DISTWIDTH] IN BLOOD BY AUTOMATED COUNT: 15.3 % (ref 12.3–15.4)
HCT VFR BLD AUTO: 37.5 % (ref 34–46.6)
HGB BLD-MCNC: 11.3 G/DL (ref 12–15.9)
IMM GRANULOCYTES # BLD AUTO: 0.03 10*3/MM3 (ref 0–0.05)
IMM GRANULOCYTES NFR BLD AUTO: 0.4 % (ref 0–0.5)
LYMPHOCYTES # BLD AUTO: 2.74 10*3/MM3 (ref 0.7–3.1)
LYMPHOCYTES NFR BLD AUTO: 38.1 % (ref 19.6–45.3)
MCH RBC QN AUTO: 27.8 PG (ref 26.6–33)
MCHC RBC AUTO-ENTMCNC: 30.1 G/DL (ref 31.5–35.7)
MCV RBC AUTO: 92.4 FL (ref 79–97)
MONOCYTES # BLD AUTO: 0.53 10*3/MM3 (ref 0.1–0.9)
MONOCYTES NFR BLD AUTO: 7.4 % (ref 5–12)
NEUTROPHILS NFR BLD AUTO: 3.25 10*3/MM3 (ref 1.7–7)
NEUTROPHILS NFR BLD AUTO: 45.2 % (ref 42.7–76)
PLATELET # BLD AUTO: 421 10*3/MM3 (ref 140–450)
PMV BLD AUTO: 9.8 FL (ref 6–12)
RBC # BLD AUTO: 4.06 10*6/MM3 (ref 3.77–5.28)
WBC # BLD AUTO: 7.19 10*3/MM3 (ref 3.4–10.8)

## 2021-08-16 PROCEDURE — 0 IOPAMIDOL PER 1 ML: Performed by: INTERNAL MEDICINE

## 2021-08-16 PROCEDURE — 99213 OFFICE O/P EST LOW 20 MIN: CPT | Performed by: INTERNAL MEDICINE

## 2021-08-16 PROCEDURE — 74178 CT ABD&PLV WO CNTR FLWD CNTR: CPT

## 2021-08-16 PROCEDURE — 85025 COMPLETE CBC W/AUTO DIFF WBC: CPT | Performed by: INTERNAL MEDICINE

## 2021-08-16 RX ADMIN — IOPAMIDOL 100 ML: 755 INJECTION, SOLUTION INTRAVENOUS at 16:53

## 2021-08-18 ENCOUNTER — OFFICE VISIT (OUTPATIENT)
Dept: SURGERY | Facility: CLINIC | Age: 66
End: 2021-08-18

## 2021-08-18 ENCOUNTER — CLINICAL SUPPORT (OUTPATIENT)
Dept: INTERNAL MEDICINE | Facility: CLINIC | Age: 66
End: 2021-08-18

## 2021-08-18 VITALS — WEIGHT: 229.4 LBS | HEIGHT: 67 IN | BODY MASS INDEX: 36 KG/M2

## 2021-08-18 DIAGNOSIS — J30.2 SEASONAL ALLERGIC RHINITIS, UNSPECIFIED TRIGGER: Primary | ICD-10-CM

## 2021-08-18 DIAGNOSIS — Z98.890 STATUS POST HERNIA REPAIR: Primary | ICD-10-CM

## 2021-08-18 DIAGNOSIS — K43.2 VENTRAL INCISIONAL HERNIA: ICD-10-CM

## 2021-08-18 DIAGNOSIS — Z87.19 STATUS POST HERNIA REPAIR: Primary | ICD-10-CM

## 2021-08-18 PROCEDURE — 36415 COLL VENOUS BLD VENIPUNCTURE: CPT | Performed by: INTERNAL MEDICINE

## 2021-08-18 PROCEDURE — 99024 POSTOP FOLLOW-UP VISIT: CPT | Performed by: SURGERY

## 2021-08-18 NOTE — PROGRESS NOTES
Chief Complaint: Post-op Follow-up (hernia repair, Ct Scan. Left sided abdominal pain x2 weeks)    Subjective         History of Present Illness  India Gaytan is a 66 y.o. female presents to [facility] SUR[facility] to be seen for follow-up after robotic hernia repair.  Patient has had a very difficult time postoperatively.  She has had experienced a severe amount of pain postoperatively more than her prior surgery.  Due to this pain she has had multiple evaluations from my nurse practitioner as well as her PCP.  She had a CT which I had my nurse practitioner order which showed the question of some duodenitis but no obvious severe or concerning findings postoperatively.  We sent her to the emergency room for this for they gave her some fluids but she was not admitted.  She reports that the pain she was having is often worsened with eating.  She is still having bowel movement she is not having any obstructive type symptoms.  She reports the pain is on her left side and wraps around to the left side into her back.  She does not report nausea but the pain is very severe.  She then went to her PCP who ordered some lab work which is grossly normal.  We repeated the CT scan due to her ongoing pain and it just shows a very small fluid collection around the hernia repair which is normal postop.  No other findings to explain her pain.    Objective     Past Medical History:   Diagnosis Date   • Anemia     ASYMPTOMATIC   • Anesthesia     SLOW TO WAKE UP POSTOP    • Benign essential hypertension    • Chronic allergic rhinitis    • Essential hypertension    • Gout 05/30/2019    greatly improved   • High cholesterol    • Hyperlipidemia    • Lower extremity edema 09/18/2017    cont compression stockings can do lymphedema therapy if she wishes in the future   • Lumbar back pain with radiculopathy affecting right lower extremity 06/12/2015   • Mixed hyperlipidemia    • Osteoarthritis of right hip 06/12/2015    • Pain of right hip joint 09/18/2017    has chronic arthritis of that hip with tendonitits seen previously in MRI however she doesn't wish to do pt at this time, will try stretches that she has done in the past at home currently   • Seasonal allergies    • Ventral hernia CURRENTLY       Past Surgical History:   Procedure Laterality Date   • ABDOMINAL HYSTERECTOMY     • BACK SURGERY     • COLON SURGERY      RELATED TO ABSCESS   • COLONOSCOPY  2006, 2019, 2020   • HERNIA REPAIR  12/2020    VENTRAL HERNIA    • HYSTERECTOMY  1996   • OOPHORECTOMY  1999   • ROTATOR CUFF REPAIR Left 2011   • VENTRAL HERNIA REPAIR N/A 8/2/2021    Procedure: VENTRAL HERNIA REPAIR LAPAROSCOPIC WITH [employer] ROBOT;  Surgeon: Eliseo Cannon MD;  Location: [facility] MA[facility];  Service: Robotics - DaVinci;  Laterality: N/A;   • VERTEBROPLASTY  08/07/2020    T11         Current Outpatient Medications:   •  albuterol sulfate  (90 Base) MCG/ACT inhaler, Inhale 2 puffs Every 4 (Four) Hours As Needed for Wheezing or Shortness of Air (USES FOR ALLEGERIES)., Disp: , Rfl:   •  azelastine (ASTELIN) 0.1 % nasal spray, SPRAY ONE TO TWO SPRAYS IN each nostril TWICE DAILY, Disp: , Rfl:   •  docusate sodium (Colace) 100 MG capsule, Take 1 capsule by mouth Daily As Needed for Constipation., Disp: 10 capsule, Rfl: 1  •  famotidine (PEPCID) 40 MG tablet, Take 1 tablet by mouth Daily., Disp: 30 tablet, Rfl: 0  •  fenofibrate (TRICOR) 145 MG tablet, Take 145 mg by mouth Daily., Disp: , Rfl:   •  fluticasone (FLONASE) 50 MCG/ACT nasal spray, SPRAY TWO SPRAY IN each nostril EVERY DAY, Disp: , Rfl:   •  furosemide (LASIX) 20 MG tablet, Take 1 tablet by mouth 2 (Two) Times a Day for 90 days. (Patient taking differently: Take 20 mg by mouth Daily. PT STATED USUALLY ONLY TAKE 1 TIME A DAY), Disp: 180 tablet, Rfl: 0  •  HYDROcodone-acetaminophen (NORCO) 5-325 MG per tablet, Take 1-2 tablets by mouth Every 4 (Four) Hours As Needed (Pain)., Disp: 20 tablet, Rfl:  0  •  Liraglutide (SAXENDA) 18 MG/3ML injection pen, Inject 0.6mg under skin daily for week one, THEN 1.2mg daily for week two, THEN 1.8mg daily for week three, then 2.4mg daily for week four. (Patient taking differently: 1.2 mg. Inject 0.6mg under skin daily for week one, THEN 1.2mg daily for week two, THEN 1.8mg daily for week three, then 2.4mg daily for week four.  PT REPORTED TAKING FOR WEIGHT LOSS), Disp: 3 pen, Rfl: 0  •  montelukast (SINGULAIR) 10 MG tablet, take 1 tablet (10 mg) by oral route once daily in the evening, Disp: , Rfl:   •  olmesartan-hydrochlorothiazide (BENICAR HCT) 20-12.5 MG per tablet, Take 1 tablet by mouth Daily., Disp: 90 tablet, Rfl: 0  •  ondansetron (Zofran) 4 MG tablet, Take 1 tablet by mouth Every 8 (Eight) Hours As Needed for Nausea or Vomiting., Disp: 15 tablet, Rfl: 0  •  pantoprazole (PROTONIX) 40 MG EC tablet, Take 1 tablet by mouth Daily., Disp: 90 tablet, Rfl: 0  •  Pataday 0.7 % solution, Administer 1 drop to both eyes Daily As Needed. NOT USED IN A WHILE, Disp: , Rfl:   •  potassium chloride (MICRO-K) 10 MEQ CR capsule, Take 1 capsule by mouth 2 (Two) Times a Day., Disp: 180 capsule, Rfl: 0  •  promethazine (PHENERGAN) 25 MG tablet, Take 1 tablet by mouth Every 6 (Six) Hours As Needed for Nausea or Vomiting., Disp: 15 tablet, Rfl: 0  •  sucralfate (Carafate) 1 g tablet, Take 1 tablet by mouth 4 (Four) Times a Day., Disp: 120 tablet, Rfl: 0  No current facility-administered medications for this visit.    Allergies   Allergen Reactions   • Levofloxacin Nausea And Vomiting     Pt reported also caused her to pass out         Family History   Problem Relation Age of Onset   • Heart disease Mother    • Heart disease Father        Social History     Socioeconomic History   • Marital status:      Spouse name: Not on file   • Number of children: Not on file   • Years of education: Not on file   • Highest education level: Not on file   Tobacco Use   • Smoking status: Never  Smoker   • Smokeless tobacco: Never Used   Vaping Use   • Vaping Use: Never used   Substance and Sexual Activity   • Alcohol use: Yes     Comment: Rarely drinks   • Drug use: Never   • Sexual activity: Defer        Physical Exam  Constitutional:       Appearance: Normal appearance.   Cardiovascular:      Rate and Rhythm: Normal rate.   Pulmonary:      Effort: Pulmonary effort is normal.   Abdominal:      Palpations: Abdomen is soft.   Skin:     General: Skin is warm.        Post Surgical Incision  Surgical wound: Incisions have some ecchymosis but no signs of infection she is soft she is mildly tender to palpation    Result Review :               Assessment and Plan    Diagnoses and all orders for this visit:    1. Status post hernia repair (Primary)    2. Ventral incisional hernia        Follow Up   No follow-ups on file.  Patient was given instructions and counseling regarding her condition or for health maintenance advice. Please see specific information pulled into the AVS if appropriate.     Despite multiple attempts to find the source of the pain, I cannot clearly or adequately explain why her pain is so severe.  My only guess is that she is just having a lot of pain postoperatively whether that is due to the placement of mesh or some pulling of the mesh or unfortunate placement of the trochars near nerve or muscle, but there does not appear to be any concerning intra-abdominal pathology.  She has contrasted completely through her small bowel she has no signs of obstruction her most recent CT shows pretty much resolution of the duodenitis she has no white count no fevers.    In my discussions with her I did offer to perform an EGD or possibly put a laparoscope back in and make sure we do not have any concerning intra-abdominal findings but I would honestly expect these to be negative based on her imaging and laboratory findings.    At this point the patient does want to undergo another procedure.  She thinks  she may be getting slightly better.  I will follow up with her again in 2 weeks for reassessment.  She is welcome to call if the pain worsens or does not continue to improve.  We can always revisit performing another procedure if necessary  Discussed with the patient - all questions were answered they voiced understanding and agreed to proceed with above plan

## 2021-08-23 ENCOUNTER — CLINICAL SUPPORT (OUTPATIENT)
Dept: INTERNAL MEDICINE | Facility: CLINIC | Age: 66
End: 2021-08-23

## 2021-08-23 DIAGNOSIS — J30.2 SEASONAL ALLERGIC RHINITIS, UNSPECIFIED TRIGGER: Primary | ICD-10-CM

## 2021-08-23 PROCEDURE — 95117 IMMUNOTHERAPY INJECTIONS: CPT | Performed by: INTERNAL MEDICINE

## 2021-08-30 RX ORDER — FENOFIBRATE 145 MG/1
TABLET, COATED ORAL
Qty: 90 TABLET | Refills: 1 | Status: SHIPPED | OUTPATIENT
Start: 2021-08-30 | End: 2022-04-22

## 2021-09-01 ENCOUNTER — OFFICE VISIT (OUTPATIENT)
Dept: SURGERY | Facility: CLINIC | Age: 66
End: 2021-09-01

## 2021-09-01 ENCOUNTER — CLINICAL SUPPORT (OUTPATIENT)
Dept: INTERNAL MEDICINE | Facility: CLINIC | Age: 66
End: 2021-09-01

## 2021-09-01 VITALS — BODY MASS INDEX: 35.82 KG/M2 | HEIGHT: 67 IN | WEIGHT: 228.2 LBS

## 2021-09-01 DIAGNOSIS — J30.2 SEASONAL ALLERGIC RHINITIS, UNSPECIFIED TRIGGER: Primary | ICD-10-CM

## 2021-09-01 DIAGNOSIS — K43.2 INCISIONAL HERNIA, WITHOUT OBSTRUCTION OR GANGRENE: Primary | ICD-10-CM

## 2021-09-01 PROCEDURE — 95117 IMMUNOTHERAPY INJECTIONS: CPT | Performed by: INTERNAL MEDICINE

## 2021-09-01 PROCEDURE — 99024 POSTOP FOLLOW-UP VISIT: CPT | Performed by: SURGERY

## 2021-09-01 NOTE — PROGRESS NOTES
Chief Complaint: Follow-up (2 wk. Still having left sided abdominal pain)    Subjective         History of Present Illness  India Gaytan is a 66 y.o. female presents to [facility] to be seen for follow-up after robotic hernia repair.  Patient still having the pain that she was having over the course of the last few weeks.  It is still fairly severe.  She reports is on the left side of her abdomen in the mid abdomen.  Otherwise she feels well not reporting any nausea or vomiting.  It is mostly just the pain.  She reports the pain is somewhat resolved with ibuprofen but she has to take a lot of it.    Objective     Past Medical History:   Diagnosis Date   • Anemia     ASYMPTOMATIC   • Anesthesia     SLOW TO WAKE UP POSTOP    • Benign essential hypertension    • Chronic allergic rhinitis    • Essential hypertension    • Gout 05/30/2019    greatly improved   • High cholesterol    • Hyperlipidemia    • Lower extremity edema 09/18/2017    cont compression stockings can do lymphedema therapy if she wishes in the future   • Lumbar back pain with radiculopathy affecting right lower extremity 06/12/2015   • Mixed hyperlipidemia    • Osteoarthritis of right hip 06/12/2015   • Pain of right hip joint 09/18/2017    has chronic arthritis of that hip with tendonitits seen previously in MRI however she doesn't wish to do pt at this time, will try stretches that she has done in the past at home currently   • Seasonal allergies    • Ventral hernia CURRENTLY       Past Surgical History:   Procedure Laterality Date   • ABDOMINAL HYSTERECTOMY     • BACK SURGERY     • COLON SURGERY      RELATED TO ABSCESS   • COLONOSCOPY  2006, 2019, 2020   • HERNIA REPAIR  12/2020    VENTRAL HERNIA    • HYSTERECTOMY  1996   • OOPHORECTOMY  1999   • ROTATOR CUFF REPAIR Left 2011   • VENTRAL HERNIA REPAIR N/A 8/2/2021    Procedure: VENTRAL HERNIA REPAIR LAPAROSCOPIC WITH [employer] ROBOT;  Surgeon: Eliseo Cannon,  MD;  Location: [facility] MAIN OR;  Service: Robotics - DaVinci;  Laterality: N/A;   • VERTEBROPLASTY  08/07/2020    T11         Current Outpatient Medications:   •  albuterol sulfate  (90 Base) MCG/ACT inhaler, Inhale 2 puffs Every 4 (Four) Hours As Needed for Wheezing or Shortness of Air (USES FOR ALLEGERIES)., Disp: , Rfl:   •  azelastine (ASTELIN) 0.1 % nasal spray, SPRAY ONE TO TWO SPRAYS IN each nostril TWICE DAILY, Disp: , Rfl:   •  docusate sodium (Colace) 100 MG capsule, Take 1 capsule by mouth Daily As Needed for Constipation., Disp: 10 capsule, Rfl: 1  •  famotidine (PEPCID) 40 MG tablet, Take 1 tablet by mouth Daily., Disp: 30 tablet, Rfl: 0  •  fenofibrate (TRICOR) 145 MG tablet, take 1 tablet (145 mg) by oral route once daily, Disp: 90 tablet, Rfl: 1  •  fluticasone (FLONASE) 50 MCG/ACT nasal spray, SPRAY TWO SPRAY IN each nostril EVERY DAY, Disp: , Rfl:   •  furosemide (LASIX) 20 MG tablet, Take 1 tablet by mouth 2 (Two) Times a Day for 90 days. (Patient taking differently: Take 20 mg by mouth Daily. PT STATED USUALLY ONLY TAKE 1 TIME A DAY), Disp: 180 tablet, Rfl: 0  •  montelukast (SINGULAIR) 10 MG tablet, take 1 tablet (10 mg) by oral route once daily in the evening, Disp: , Rfl:   •  olmesartan-hydrochlorothiazide (BENICAR HCT) 20-12.5 MG per tablet, Take 1 tablet by mouth Daily., Disp: 90 tablet, Rfl: 0  •  Pataday 0.7 % solution, Administer 1 drop to both eyes Daily As Needed. NOT USED IN A WHILE, Disp: , Rfl:   •  potassium chloride (MICRO-K) 10 MEQ CR capsule, Take 1 capsule by mouth 2 (Two) Times a Day., Disp: 180 capsule, Rfl: 0  •  HYDROcodone-acetaminophen (NORCO) 5-325 MG per tablet, Take 1-2 tablets by mouth Every 4 (Four) Hours As Needed (Pain)., Disp: 20 tablet, Rfl: 0  •  Liraglutide (SAXENDA) 18 MG/3ML injection pen, Inject 0.6mg under skin daily for week one, THEN 1.2mg daily for week two, THEN 1.8mg daily for week three, then 2.4mg daily for week four. (Patient taking  differently: 1.2 mg. Inject 0.6mg under skin daily for week one, THEN 1.2mg daily for week two, THEN 1.8mg daily for week three, then 2.4mg daily for week four.  PT REPORTED TAKING FOR WEIGHT LOSS), Disp: 3 pen, Rfl: 0  •  ondansetron (Zofran) 4 MG tablet, Take 1 tablet by mouth Every 8 (Eight) Hours As Needed for Nausea or Vomiting., Disp: 15 tablet, Rfl: 0  •  pantoprazole (PROTONIX) 40 MG EC tablet, Take 1 tablet by mouth Daily., Disp: 90 tablet, Rfl: 0  •  promethazine (PHENERGAN) 25 MG tablet, Take 1 tablet by mouth Every 6 (Six) Hours As Needed for Nausea or Vomiting., Disp: 15 tablet, Rfl: 0  •  sucralfate (Carafate) 1 g tablet, Take 1 tablet by mouth 4 (Four) Times a Day., Disp: 120 tablet, Rfl: 0  No current facility-administered medications for this visit.    Allergies   Allergen Reactions   • Levofloxacin Nausea And Vomiting     Pt reported also caused her to pass out         Family History   Problem Relation Age of Onset   • Heart disease Mother    • Heart disease Father        Social History     Socioeconomic History   • Marital status:      Spouse name: Not on file   • Number of children: Not on file   • Years of education: Not on file   • Highest education level: Not on file   Tobacco Use   • Smoking status: Never Smoker   • Smokeless tobacco: Never Used   Vaping Use   • Vaping Use: Never used   Substance and Sexual Activity   • Alcohol use: Yes     Comment: Rarely drinks   • Drug use: Never   • Sexual activity: Defer        Physical Exam  Constitutional:       Appearance: Normal appearance.   Cardiovascular:      Rate and Rhythm: Normal rate.   Pulmonary:      Effort: Pulmonary effort is normal.   Abdominal:      Palpations: Abdomen is soft.   Skin:     General: Skin is warm.        Post Surgical Incision  Surgical wound: Well-healed    Result Review :               Assessment and Plan    Diagnoses and all orders for this visit:    1. Incisional hernia, without obstruction or gangrene  (Primary)        Follow Up   No follow-ups on file.  Patient was given instructions and counseling regarding her condition or for health maintenance advice. Please see specific information pulled into the AVS if appropriate.       I cannot exactly explain why the patient continues to have pain.  I would expect incisional pain to be resolved or the very least improved at this point in time.  She seems to have no improvement in the pain.  CT scan has only ever shown duodenitis.  Where her pain is located would not be typical spot for duodenitis.    In order to continue working up her pain seeing as I am not exactly sure the next best step.  Imaging has really not been terribly helpful.  Lab work has been essentially normal.  I think that we could do an EGD if the suspicion of duodenitis were high that could give us some clues.  I have a low suspicion that she has active or ongoing duodenitis given the location of her pain and the lack of other associated symptoms.  Seeing as her pain has only ever been since surgery I have to assume that this could be some postsurgical issue possibly a port site hernia, although this was not seen on CT.    I have recommended that the next best step would be a diagnostic laparoscopy to evaluate intra-abdominal pathology from the hernia repair.    The patient is hesitant to undergo another surgical intervention which I understand.  At this point time she wants to hold off.  She will have a discussion with her daughters and call me back if she wants to schedule surgery.  In the meantime if there is anything I can do or any other imaging or study I will pursue it    Discussed with the patient - all questions were answered they voiced understanding and agreed to proceed with above plan

## 2021-09-09 ENCOUNTER — TRANSCRIBE ORDERS (OUTPATIENT)
Dept: INTERNAL MEDICINE | Facility: CLINIC | Age: 66
End: 2021-09-09

## 2021-09-09 PROCEDURE — U0004 COV-19 TEST NON-CDC HGH THRU: HCPCS | Performed by: NURSE PRACTITIONER

## 2021-09-14 ENCOUNTER — CLINICAL SUPPORT (OUTPATIENT)
Dept: INTERNAL MEDICINE | Facility: CLINIC | Age: 66
End: 2021-09-14

## 2021-09-14 DIAGNOSIS — J30.2 SEASONAL ALLERGIC RHINITIS, UNSPECIFIED TRIGGER: Primary | ICD-10-CM

## 2021-09-14 PROCEDURE — 95117 IMMUNOTHERAPY INJECTIONS: CPT | Performed by: INTERNAL MEDICINE

## 2021-09-21 ENCOUNTER — CLINICAL SUPPORT (OUTPATIENT)
Dept: INTERNAL MEDICINE | Facility: CLINIC | Age: 66
End: 2021-09-21

## 2021-09-21 DIAGNOSIS — J30.9 ALLERGIC RHINITIS, UNSPECIFIED SEASONALITY, UNSPECIFIED TRIGGER: Primary | ICD-10-CM

## 2021-09-21 PROCEDURE — 95117 IMMUNOTHERAPY INJECTIONS: CPT | Performed by: INTERNAL MEDICINE

## 2021-09-28 DIAGNOSIS — I10 ESSENTIAL HYPERTENSION: ICD-10-CM

## 2021-09-28 RX ORDER — OLMESARTAN MEDOXOMIL AND HYDROCHLOROTHIAZIDE 20/12.5 20; 12.5 MG/1; MG/1
1 TABLET ORAL DAILY
Qty: 90 TABLET | Refills: 0 | Status: SHIPPED | OUTPATIENT
Start: 2021-09-28 | End: 2021-12-29

## 2021-09-29 ENCOUNTER — OFFICE VISIT (OUTPATIENT)
Dept: INTERNAL MEDICINE | Facility: CLINIC | Age: 66
End: 2021-09-29

## 2021-09-29 ENCOUNTER — PRIOR AUTHORIZATION (OUTPATIENT)
Dept: INTERNAL MEDICINE | Facility: CLINIC | Age: 66
End: 2021-09-29

## 2021-09-29 VITALS
DIASTOLIC BLOOD PRESSURE: 74 MMHG | BODY MASS INDEX: 35.16 KG/M2 | HEART RATE: 64 BPM | SYSTOLIC BLOOD PRESSURE: 110 MMHG | WEIGHT: 224 LBS | OXYGEN SATURATION: 100 % | HEIGHT: 67 IN | RESPIRATION RATE: 18 BRPM | TEMPERATURE: 95.4 F

## 2021-09-29 DIAGNOSIS — K21.9 GASTROESOPHAGEAL REFLUX DISEASE, UNSPECIFIED WHETHER ESOPHAGITIS PRESENT: Primary | ICD-10-CM

## 2021-09-29 DIAGNOSIS — J30.9 ALLERGIC RHINITIS, UNSPECIFIED SEASONALITY, UNSPECIFIED TRIGGER: ICD-10-CM

## 2021-09-29 DIAGNOSIS — K43.2 INCISIONAL HERNIA, WITHOUT OBSTRUCTION OR GANGRENE: ICD-10-CM

## 2021-09-29 PROCEDURE — 95117 IMMUNOTHERAPY INJECTIONS: CPT | Performed by: INTERNAL MEDICINE

## 2021-09-29 PROCEDURE — 99214 OFFICE O/P EST MOD 30 MIN: CPT | Performed by: INTERNAL MEDICINE

## 2021-09-29 RX ORDER — PANTOPRAZOLE SODIUM 40 MG/1
40 TABLET, DELAYED RELEASE ORAL DAILY
Qty: 90 TABLET | Refills: 1 | Status: SHIPPED | OUTPATIENT
Start: 2021-09-29 | End: 2021-10-04 | Stop reason: SDUPTHER

## 2021-09-29 RX ORDER — FUROSEMIDE 20 MG/1
20 TABLET ORAL DAILY
Qty: 90 TABLET | Refills: 0 | Status: SHIPPED | OUTPATIENT
Start: 2021-09-29 | End: 2021-11-05 | Stop reason: SDUPTHER

## 2021-09-29 NOTE — PROGRESS NOTES
"Chief Complaint  Follow-up and Abdominal Pain    Subjective          India Gaytan presents to [facility]  History of Present Illness    Feels like since her surgery she gets nauseated  She feels like her stomach just isn't moving food through like it did before  She doesn't feel like she has lost weight since starting the saxenda  She started taking gaviscon, and that has been difficult to find    The stomach pain is much better    Hasn't started exercising or walking yet    Is taking the saxenda, didn't notice a difference with increasing her meds.    Objective   Vital Signs:   /74 (BP Location: Left arm, Patient Position: Sitting, Cuff Size: Adult)   Pulse 64   Temp 95.4 °F (35.2 °C) (Temporal)   Resp 18   Ht 170.2 cm (67\")   Wt 102 kg (224 lb)   SpO2 100%   BMI 35.08 kg/m²     Physical Exam  Vitals reviewed.   Constitutional:       Appearance: Normal appearance. She is well-developed. She is obese.   HENT:      Head: Normocephalic and atraumatic.      Right Ear: External ear normal.      Left Ear: External ear normal.      Mouth/Throat:      Pharynx: No oropharyngeal exudate.   Eyes:      Conjunctiva/sclera: Conjunctivae normal.      Pupils: Pupils are equal, round, and reactive to light.   Cardiovascular:      Rate and Rhythm: Normal rate and regular rhythm.      Heart sounds: No murmur heard.   No friction rub. No gallop.    Pulmonary:      Effort: Pulmonary effort is normal.      Breath sounds: Normal breath sounds. No wheezing or rhonchi.   Skin:     General: Skin is warm and dry.   Neurological:      Mental Status: She is alert and oriented to person, place, and time.      Cranial Nerves: No cranial nerve deficit.   Psychiatric:         Mood and Affect: Affect normal.         Behavior: Behavior normal.         Thought Content: Thought content normal.        Result Review :     Common labs    Common Labsle 8/2/21 8/10/21 8/10/21 8/16/21     " 0026 0026    Glucose 121 (A)  103 (A)    BUN 31 (A)  23    Creatinine 1.00  0.82    eGFR Non African Am 56 (A)  70    Sodium 139  140    Potassium 3.8  3.9    Chloride 102  103    Calcium 9.9  9.4    Albumin   4.00    Total Bilirubin   0.2    Alkaline Phosphatase   74    AST (SGOT)   23    ALT (SGPT)   28    WBC  10.35  7.19   Hemoglobin  11.1 (A)  11.3 (A)   Hematocrit  34.2  37.5   Platelets  420  421   (A) Abnormal value               Procedures      Assessment and Plan    Diagnoses and all orders for this visit:    1. Gastroesophageal reflux disease, unspecified whether esophagitis present (Primary)  Comments:  start PPI    2. Incisional hernia, without obstruction or gangrene  Comments:  doing much better, will work on walking and start ppi  Orders:  -     Liraglutide (SAXENDA) 18 MG/3ML injection pen; Inject 2.4 mg under the skin into the appropriate area as directed 1 (One) Time Per Week. Inject 2.4mg po weekly  Dispense: 3 mL; Refill: 1    3. Body mass index (BMI) of 38.0 to 38.9 in adult  Comments:  refill saxenda  Orders:  -     Liraglutide (SAXENDA) 18 MG/3ML injection pen; Inject 2.4 mg under the skin into the appropriate area as directed 1 (One) Time Per Week. Inject 2.4mg po weekly  Dispense: 3 mL; Refill: 1    Other orders  -     pantoprazole (PROTONIX) 40 MG EC tablet; Take 1 tablet by mouth Daily.  Dispense: 90 tablet; Refill: 1  -     furosemide (LASIX) 20 MG tablet; Take 1 tablet by mouth Daily for 90 days. PT STATED USUALLY ONLY TAKE 1 TIME A DAY  Dispense: 90 tablet; Refill: 0              Follow Up   Return in about 1 month (around 10/29/2021).  Patient was given instructions and counseling regarding her condition or for health maintenance advice. Please see specific information pulled into the AVS if appropriate.

## 2021-10-04 ENCOUNTER — CLINICAL SUPPORT (OUTPATIENT)
Dept: INTERNAL MEDICINE | Facility: CLINIC | Age: 66
End: 2021-10-04

## 2021-10-04 DIAGNOSIS — K43.2 INCISIONAL HERNIA, WITHOUT OBSTRUCTION OR GANGRENE: ICD-10-CM

## 2021-10-04 DIAGNOSIS — J30.9 ALLERGIC RHINITIS, UNSPECIFIED SEASONALITY, UNSPECIFIED TRIGGER: Primary | ICD-10-CM

## 2021-10-04 PROCEDURE — 95117 IMMUNOTHERAPY INJECTIONS: CPT | Performed by: INTERNAL MEDICINE

## 2021-10-04 RX ORDER — PEN NEEDLE, DIABETIC 30 GX3/16"
1 NEEDLE, DISPOSABLE MISCELLANEOUS ONCE AS NEEDED
Qty: 100 EACH | Refills: 0 | Status: SHIPPED | OUTPATIENT
Start: 2021-10-04 | End: 2022-08-15

## 2021-10-04 RX ORDER — PANTOPRAZOLE SODIUM 40 MG/1
40 TABLET, DELAYED RELEASE ORAL DAILY
Qty: 90 TABLET | Refills: 1 | Status: SHIPPED | OUTPATIENT
Start: 2021-10-04 | End: 2022-04-22

## 2021-10-04 NOTE — TELEPHONE ENCOUNTER
Patient presented to office for allergy injections and requested that medications that had been sent to [facility] last week, be sent to [facility] in [place]. Completed.

## 2021-10-13 ENCOUNTER — CLINICAL SUPPORT (OUTPATIENT)
Dept: INTERNAL MEDICINE | Facility: CLINIC | Age: 66
End: 2021-10-13

## 2021-10-13 DIAGNOSIS — J30.9 ALLERGIC RHINITIS, UNSPECIFIED SEASONALITY, UNSPECIFIED TRIGGER: Primary | ICD-10-CM

## 2021-10-13 PROCEDURE — 95117 IMMUNOTHERAPY INJECTIONS: CPT | Performed by: INTERNAL MEDICINE

## 2021-10-20 ENCOUNTER — CLINICAL SUPPORT (OUTPATIENT)
Dept: INTERNAL MEDICINE | Facility: CLINIC | Age: 66
End: 2021-10-20

## 2021-10-20 DIAGNOSIS — J30.9 ALLERGIC RHINITIS, UNSPECIFIED SEASONALITY, UNSPECIFIED TRIGGER: Primary | ICD-10-CM

## 2021-10-20 PROCEDURE — 95117 IMMUNOTHERAPY INJECTIONS: CPT | Performed by: INTERNAL MEDICINE

## 2021-10-27 ENCOUNTER — OFFICE VISIT (OUTPATIENT)
Dept: INTERNAL MEDICINE | Facility: CLINIC | Age: 66
End: 2021-10-27

## 2021-10-27 VITALS
RESPIRATION RATE: 14 BRPM | SYSTOLIC BLOOD PRESSURE: 138 MMHG | HEART RATE: 59 BPM | OXYGEN SATURATION: 98 % | HEIGHT: 67 IN | BODY MASS INDEX: 35.63 KG/M2 | TEMPERATURE: 98.6 F | DIASTOLIC BLOOD PRESSURE: 88 MMHG | WEIGHT: 227 LBS

## 2021-10-27 DIAGNOSIS — E66.9 OBESITY (BMI 30-39.9): ICD-10-CM

## 2021-10-27 DIAGNOSIS — R60.0 LOWER EXTREMITY EDEMA: ICD-10-CM

## 2021-10-27 DIAGNOSIS — R10.84 GENERALIZED ABDOMINAL PAIN: Primary | ICD-10-CM

## 2021-10-27 DIAGNOSIS — J30.9 ALLERGIC RHINITIS, UNSPECIFIED SEASONALITY, UNSPECIFIED TRIGGER: ICD-10-CM

## 2021-10-27 DIAGNOSIS — R01.1 HEART MURMUR: ICD-10-CM

## 2021-10-27 PROCEDURE — 95117 IMMUNOTHERAPY INJECTIONS: CPT | Performed by: INTERNAL MEDICINE

## 2021-10-27 PROCEDURE — 99213 OFFICE O/P EST LOW 20 MIN: CPT | Performed by: INTERNAL MEDICINE

## 2021-10-27 NOTE — PROGRESS NOTES
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   Constitutional:       Appearance: Normal appearance. She is well-developed.   HENT:      Head: Normocephalic and atraumatic.      Right Ear: External ear normal.      Left Ear: External ear normal.   Eyes:      Conjunctiva/sclera: Conjunctivae normal.      Pupils: Pupils are equal, round, and reactive to light.   Cardiovascular:      Rate and Rhythm: Normal rate and regular rhythm.      Heart sounds: Murmur heard.   No friction rub. No gallop.       Comments: 2+ edema bilaterally  Pulmonary:      Effort: Pulmonary effort is normal.      Breath sounds: Normal breath sounds. No wheezing or rhonchi.   Skin:     General: Skin is warm and dry.   Neurological:      Mental Status: She is alert and oriented to person, place, and time.      Cranial Nerves: No cranial nerve deficit.   Psychiatric:         Mood and Affect: Affect normal.         Behavior: Behavior normal.         Thought Content: Thought content normal.        Result Review :     Common labs    Common Labsle 8/2/21 8/10/21 8/10/21 8/16/21     0026 0026    Glucose 121 (A)  103 (A)    BUN 31 (A)  23    Creatinine 1.00  0.82    eGFR Non African Am 56 (A)  70    Sodium 139  140    Potassium 3.8  3.9    Chloride 102  103    Calcium 9.9  9.4    Albumin   4.00    Total Bilirubin   0.2    Alkaline Phosphatase   74    AST (SGOT)   23    ALT (SGPT)   " 28    WBC  10.35  7.19   Hemoglobin  11.1 (A)  11.3 (A)   Hematocrit  34.2  37.5   Platelets  420  421   (A) Abnormal value               Procedures      Assessment and Plan    Diagnoses and all orders for this visit:    1. Generalized abdominal pain (Primary)  Comments:  doing better; vomiting was happening once a week, this is around the time when she was doing saxenda, will adjust meds    2. Heart murmur  Assessment & Plan:  ECHO from March showing slight LVH. Otherwise normal.      3. Allergic rhinitis, unspecified seasonality, unspecified trigger  -     patient supplied allergy injection    4. Lower extremity edema    5. Obesity (BMI 30-39.9)  Comments:  will start saxenda daily and see  if this helps with his vomiting    Other orders  -     Liraglutide (SAXENDA) 18 MG/3ML injection pen; Inject 0.6mg under skin daily for week one, THEN 1.2mg daily for week two, THEN 1.8mg daily for week three, then 2.4mg daily for week four.  Dispense: 3 pen; Refill: 0            Follow Up   Return in about 2 months (around 12/27/2021).  Patient was given instructions and counseling regarding her condition or for health maintenance advice. Please see specific information pulled into the AVS if appropriate.

## 2021-11-01 ENCOUNTER — CLINICAL SUPPORT (OUTPATIENT)
Dept: INTERNAL MEDICINE | Facility: CLINIC | Age: 66
End: 2021-11-01

## 2021-11-01 DIAGNOSIS — J30.9 ALLERGIC RHINITIS, UNSPECIFIED SEASONALITY, UNSPECIFIED TRIGGER: Primary | ICD-10-CM

## 2021-11-01 PROCEDURE — 95117 IMMUNOTHERAPY INJECTIONS: CPT | Performed by: INTERNAL MEDICINE

## 2021-11-05 RX ORDER — POTASSIUM CHLORIDE 750 MG/1
10 CAPSULE, EXTENDED RELEASE ORAL 2 TIMES DAILY
Qty: 180 CAPSULE | Refills: 0 | Status: SHIPPED | OUTPATIENT
Start: 2021-11-05 | End: 2022-02-14

## 2021-11-05 RX ORDER — FUROSEMIDE 20 MG/1
20 TABLET ORAL DAILY
Qty: 90 TABLET | Refills: 1 | Status: SHIPPED | OUTPATIENT
Start: 2021-11-05 | End: 2022-04-22

## 2021-11-11 ENCOUNTER — CLINICAL SUPPORT (OUTPATIENT)
Dept: INTERNAL MEDICINE | Facility: CLINIC | Age: 66
End: 2021-11-11

## 2021-11-11 DIAGNOSIS — J30.9 ALLERGIC RHINITIS, UNSPECIFIED SEASONALITY, UNSPECIFIED TRIGGER: Primary | ICD-10-CM

## 2021-11-11 PROCEDURE — 95117 IMMUNOTHERAPY INJECTIONS: CPT | Performed by: INTERNAL MEDICINE

## 2021-11-19 ENCOUNTER — CLINICAL SUPPORT (OUTPATIENT)
Dept: INTERNAL MEDICINE | Facility: CLINIC | Age: 66
End: 2021-11-19

## 2021-11-19 DIAGNOSIS — J30.9 ALLERGIC RHINITIS, UNSPECIFIED SEASONALITY, UNSPECIFIED TRIGGER: Primary | ICD-10-CM

## 2021-11-19 PROCEDURE — 95117 IMMUNOTHERAPY INJECTIONS: CPT | Performed by: INTERNAL MEDICINE

## 2021-11-24 ENCOUNTER — CLINICAL SUPPORT (OUTPATIENT)
Dept: INTERNAL MEDICINE | Facility: CLINIC | Age: 66
End: 2021-11-24

## 2021-11-24 DIAGNOSIS — J30.9 ALLERGIC RHINITIS, UNSPECIFIED SEASONALITY, UNSPECIFIED TRIGGER: Primary | ICD-10-CM

## 2021-11-24 PROCEDURE — 95117 IMMUNOTHERAPY INJECTIONS: CPT | Performed by: INTERNAL MEDICINE

## 2021-12-03 ENCOUNTER — CLINICAL SUPPORT (OUTPATIENT)
Dept: INTERNAL MEDICINE | Facility: CLINIC | Age: 66
End: 2021-12-03

## 2021-12-03 DIAGNOSIS — J30.9 ALLERGIC RHINITIS, UNSPECIFIED SEASONALITY, UNSPECIFIED TRIGGER: ICD-10-CM

## 2021-12-03 PROCEDURE — 95117 IMMUNOTHERAPY INJECTIONS: CPT | Performed by: INTERNAL MEDICINE

## 2021-12-10 ENCOUNTER — CLINICAL SUPPORT (OUTPATIENT)
Dept: INTERNAL MEDICINE | Facility: CLINIC | Age: 66
End: 2021-12-10

## 2021-12-10 DIAGNOSIS — J30.9 ALLERGIC RHINITIS, UNSPECIFIED SEASONALITY, UNSPECIFIED TRIGGER: Primary | ICD-10-CM

## 2021-12-10 PROCEDURE — 95117 IMMUNOTHERAPY INJECTIONS: CPT | Performed by: INTERNAL MEDICINE

## 2021-12-17 ENCOUNTER — CLINICAL SUPPORT (OUTPATIENT)
Dept: INTERNAL MEDICINE | Facility: CLINIC | Age: 66
End: 2021-12-17

## 2021-12-17 DIAGNOSIS — J30.9 ALLERGIC RHINITIS, UNSPECIFIED SEASONALITY, UNSPECIFIED TRIGGER: Primary | ICD-10-CM

## 2021-12-17 PROCEDURE — 95117 IMMUNOTHERAPY INJECTIONS: CPT | Performed by: INTERNAL MEDICINE

## 2021-12-22 ENCOUNTER — CLINICAL SUPPORT (OUTPATIENT)
Dept: INTERNAL MEDICINE | Facility: CLINIC | Age: 66
End: 2021-12-22

## 2021-12-22 DIAGNOSIS — J30.9 ALLERGIC RHINITIS, UNSPECIFIED SEASONALITY, UNSPECIFIED TRIGGER: Primary | ICD-10-CM

## 2021-12-22 PROCEDURE — 95117 IMMUNOTHERAPY INJECTIONS: CPT | Performed by: INTERNAL MEDICINE

## 2021-12-29 DIAGNOSIS — I10 ESSENTIAL HYPERTENSION: ICD-10-CM

## 2021-12-29 RX ORDER — OLMESARTAN MEDOXOMIL AND HYDROCHLOROTHIAZIDE 20/12.5 20; 12.5 MG/1; MG/1
TABLET ORAL
Qty: 90 TABLET | Refills: 0 | Status: SHIPPED | OUTPATIENT
Start: 2021-12-29 | End: 2022-03-31

## 2021-12-30 ENCOUNTER — CLINICAL SUPPORT (OUTPATIENT)
Dept: INTERNAL MEDICINE | Facility: CLINIC | Age: 66
End: 2021-12-30

## 2021-12-30 DIAGNOSIS — T78.40XA ALLERGY, INITIAL ENCOUNTER: Primary | ICD-10-CM

## 2021-12-30 PROCEDURE — 95117 IMMUNOTHERAPY INJECTIONS: CPT | Performed by: INTERNAL MEDICINE

## 2022-01-05 ENCOUNTER — CLINICAL SUPPORT (OUTPATIENT)
Dept: INTERNAL MEDICINE | Facility: CLINIC | Age: 67
End: 2022-01-05

## 2022-01-05 DIAGNOSIS — J30.9 ALLERGIC RHINITIS, UNSPECIFIED SEASONALITY, UNSPECIFIED TRIGGER: Primary | ICD-10-CM

## 2022-01-05 PROCEDURE — 95117 IMMUNOTHERAPY INJECTIONS: CPT | Performed by: INTERNAL MEDICINE

## 2022-01-18 ENCOUNTER — CLINICAL SUPPORT (OUTPATIENT)
Dept: INTERNAL MEDICINE | Facility: CLINIC | Age: 67
End: 2022-01-18

## 2022-01-18 DIAGNOSIS — J30.2 SEASONAL ALLERGIC RHINITIS, UNSPECIFIED TRIGGER: Primary | ICD-10-CM

## 2022-01-18 PROCEDURE — 95117 IMMUNOTHERAPY INJECTIONS: CPT | Performed by: INTERNAL MEDICINE

## 2022-01-28 ENCOUNTER — CLINICAL SUPPORT (OUTPATIENT)
Dept: INTERNAL MEDICINE | Facility: CLINIC | Age: 67
End: 2022-01-28

## 2022-01-28 DIAGNOSIS — J30.2 SEASONAL ALLERGIC RHINITIS, UNSPECIFIED TRIGGER: Primary | ICD-10-CM

## 2022-01-28 PROCEDURE — 95117 IMMUNOTHERAPY INJECTIONS: CPT | Performed by: INTERNAL MEDICINE

## 2022-02-02 ENCOUNTER — CLINICAL SUPPORT (OUTPATIENT)
Dept: INTERNAL MEDICINE | Facility: CLINIC | Age: 67
End: 2022-02-02

## 2022-02-02 DIAGNOSIS — J30.2 SEASONAL ALLERGIC RHINITIS, UNSPECIFIED TRIGGER: Primary | ICD-10-CM

## 2022-02-02 PROCEDURE — 95117 IMMUNOTHERAPY INJECTIONS: CPT | Performed by: INTERNAL MEDICINE

## 2022-02-10 ENCOUNTER — CLINICAL SUPPORT (OUTPATIENT)
Dept: INTERNAL MEDICINE | Facility: CLINIC | Age: 67
End: 2022-02-10

## 2022-02-10 DIAGNOSIS — J30.2 SEASONAL ALLERGIC RHINITIS, UNSPECIFIED TRIGGER: Primary | ICD-10-CM

## 2022-02-10 PROCEDURE — 95117 IMMUNOTHERAPY INJECTIONS: CPT | Performed by: INTERNAL MEDICINE

## 2022-02-14 RX ORDER — POTASSIUM CHLORIDE 750 MG/1
CAPSULE, EXTENDED RELEASE ORAL
Qty: 180 CAPSULE | Refills: 0 | Status: SHIPPED | OUTPATIENT
Start: 2022-02-14 | End: 2022-04-22

## 2022-02-16 ENCOUNTER — CLINICAL SUPPORT (OUTPATIENT)
Dept: INTERNAL MEDICINE | Facility: CLINIC | Age: 67
End: 2022-02-16

## 2022-02-16 DIAGNOSIS — J30.2 SEASONAL ALLERGIC RHINITIS, UNSPECIFIED TRIGGER: Primary | ICD-10-CM

## 2022-02-16 PROCEDURE — 95117 IMMUNOTHERAPY INJECTIONS: CPT | Performed by: INTERNAL MEDICINE

## 2022-02-25 ENCOUNTER — CLINICAL SUPPORT (OUTPATIENT)
Dept: INTERNAL MEDICINE | Facility: CLINIC | Age: 67
End: 2022-02-25

## 2022-02-25 DIAGNOSIS — J30.2 SEASONAL ALLERGIC RHINITIS, UNSPECIFIED TRIGGER: Primary | ICD-10-CM

## 2022-02-25 DIAGNOSIS — J30.9 ALLERGIC RHINITIS, UNSPECIFIED SEASONALITY, UNSPECIFIED TRIGGER: ICD-10-CM

## 2022-02-25 PROCEDURE — 95117 IMMUNOTHERAPY INJECTIONS: CPT | Performed by: INTERNAL MEDICINE

## 2022-03-02 ENCOUNTER — CLINICAL SUPPORT (OUTPATIENT)
Dept: INTERNAL MEDICINE | Facility: CLINIC | Age: 67
End: 2022-03-02

## 2022-03-02 DIAGNOSIS — J30.2 SEASONAL ALLERGIC RHINITIS, UNSPECIFIED TRIGGER: Primary | ICD-10-CM

## 2022-03-02 PROCEDURE — 95117 IMMUNOTHERAPY INJECTIONS: CPT | Performed by: INTERNAL MEDICINE

## 2022-03-07 ENCOUNTER — PRIOR AUTHORIZATION (OUTPATIENT)
Dept: INTERNAL MEDICINE | Facility: CLINIC | Age: 67
End: 2022-03-07

## 2022-03-11 ENCOUNTER — CLINICAL SUPPORT (OUTPATIENT)
Dept: INTERNAL MEDICINE | Facility: CLINIC | Age: 67
End: 2022-03-11

## 2022-03-11 DIAGNOSIS — J30.2 SEASONAL ALLERGIC RHINITIS, UNSPECIFIED TRIGGER: Primary | ICD-10-CM

## 2022-03-11 PROCEDURE — 95117 IMMUNOTHERAPY INJECTIONS: CPT | Performed by: INTERNAL MEDICINE

## 2022-03-18 ENCOUNTER — CLINICAL SUPPORT (OUTPATIENT)
Dept: INTERNAL MEDICINE | Facility: CLINIC | Age: 67
End: 2022-03-18

## 2022-03-18 DIAGNOSIS — J30.2 SEASONAL ALLERGIC RHINITIS, UNSPECIFIED TRIGGER: Primary | ICD-10-CM

## 2022-03-18 PROCEDURE — 95117 IMMUNOTHERAPY INJECTIONS: CPT | Performed by: INTERNAL MEDICINE

## 2022-03-23 ENCOUNTER — TELEPHONE (OUTPATIENT)
Dept: INTERNAL MEDICINE | Facility: CLINIC | Age: 67
End: 2022-03-23

## 2022-03-25 ENCOUNTER — TELEPHONE (OUTPATIENT)
Dept: INTERNAL MEDICINE | Facility: CLINIC | Age: 67
End: 2022-03-25

## 2022-03-25 ENCOUNTER — CLINICAL SUPPORT (OUTPATIENT)
Dept: INTERNAL MEDICINE | Facility: CLINIC | Age: 67
End: 2022-03-25

## 2022-03-25 DIAGNOSIS — J30.2 SEASONAL ALLERGIC RHINITIS, UNSPECIFIED TRIGGER: Primary | ICD-10-CM

## 2022-03-25 PROCEDURE — 95117 IMMUNOTHERAPY INJECTIONS: CPT | Performed by: INTERNAL MEDICINE

## 2022-03-25 NOTE — TELEPHONE ENCOUNTER
Spoke to Zully RN at [facility] due to patient coming back into Clinic complaining of extra itchiness and she was SOA for about 5 minutes after she left earlier. Patient is to not get allergy injections until Allergist calls us back. Patient was given a 25mg Benadryl and bottle of water. Patient is being watch for the next 30 minutes per Zully to watch for any more signs of an Allergic reaction. Patient is doing better at this time.

## 2022-03-29 ENCOUNTER — TELEPHONE (OUTPATIENT)
Dept: INTERNAL MEDICINE | Facility: CLINIC | Age: 67
End: 2022-03-29

## 2022-03-29 NOTE — TELEPHONE ENCOUNTER
Caller: [facility]     Relationship: [facility]     Best call back number: [phone]    What was the call regarding: PATIENT HAD REACTION TO MEDICATION LAST WEEK AND [facility] HAS AN ADJUSTMENT.     ALLERGY SHOTS.     NO INJECTION UNTIL April 1ST, DILUTE TO RED VILE'S .05 AND REBUILD AS TOLERATED, INSURE THAT SHE WAITS 30 MINS AFTER INJECTIONS AND TAKES AN ANTIHISTAMINE 2 HOURS PRIOR TO HER INJECTIONS.    Do you require a callback: YES, PLEASE CALL BACK AND ADVISE.

## 2022-03-30 NOTE — TELEPHONE ENCOUNTER
Please be sure this is documented in her allergy shot record ASAP so that we do not give her the wrong dose the next time she comes in.

## 2022-03-31 DIAGNOSIS — I10 ESSENTIAL HYPERTENSION: ICD-10-CM

## 2022-03-31 RX ORDER — OLMESARTAN MEDOXOMIL AND HYDROCHLOROTHIAZIDE 20/12.5 20; 12.5 MG/1; MG/1
TABLET ORAL
Qty: 90 TABLET | Refills: 0 | Status: SHIPPED | OUTPATIENT
Start: 2022-03-31 | End: 2022-04-22 | Stop reason: SDUPTHER

## 2022-04-01 ENCOUNTER — TELEPHONE (OUTPATIENT)
Dept: INTERNAL MEDICINE | Facility: CLINIC | Age: 67
End: 2022-04-01

## 2022-04-01 ENCOUNTER — CLINICAL SUPPORT (OUTPATIENT)
Dept: INTERNAL MEDICINE | Facility: CLINIC | Age: 67
End: 2022-04-01

## 2022-04-01 DIAGNOSIS — J30.9 ALLERGIC RHINITIS, UNSPECIFIED SEASONALITY, UNSPECIFIED TRIGGER: Primary | ICD-10-CM

## 2022-04-01 PROCEDURE — 95117 IMMUNOTHERAPY INJECTIONS: CPT | Performed by: INTERNAL MEDICINE

## 2022-04-01 NOTE — TELEPHONE ENCOUNTER
Patient came in this morning for her weekly allergy injection. We started back at .05 red vial, per Allergist, due to reaction she had last week. I advised patient to wait 30 minutes, also per allergist, but she said she had somewhere else she had to be.     Patient did not wait at all. I let her know I was going to make documentation, since it was advised she wait.

## 2022-04-08 ENCOUNTER — CLINICAL SUPPORT (OUTPATIENT)
Dept: INTERNAL MEDICINE | Facility: CLINIC | Age: 67
End: 2022-04-08

## 2022-04-08 DIAGNOSIS — J30.9 ALLERGIC RHINITIS, UNSPECIFIED SEASONALITY, UNSPECIFIED TRIGGER: Primary | ICD-10-CM

## 2022-04-15 ENCOUNTER — CLINICAL SUPPORT (OUTPATIENT)
Dept: INTERNAL MEDICINE | Facility: CLINIC | Age: 67
End: 2022-04-15

## 2022-04-15 DIAGNOSIS — J30.9 ALLERGIC RHINITIS, UNSPECIFIED SEASONALITY, UNSPECIFIED TRIGGER: Primary | ICD-10-CM

## 2022-04-15 PROCEDURE — 95117 IMMUNOTHERAPY INJECTIONS: CPT | Performed by: INTERNAL MEDICINE

## 2022-04-22 ENCOUNTER — OFFICE VISIT (OUTPATIENT)
Dept: INTERNAL MEDICINE | Facility: CLINIC | Age: 67
End: 2022-04-22

## 2022-04-22 VITALS
OXYGEN SATURATION: 99 % | RESPIRATION RATE: 12 BRPM | TEMPERATURE: 97 F | HEART RATE: 73 BPM | SYSTOLIC BLOOD PRESSURE: 120 MMHG | HEIGHT: 67 IN | DIASTOLIC BLOOD PRESSURE: 62 MMHG | WEIGHT: 235.4 LBS | BODY MASS INDEX: 36.95 KG/M2

## 2022-04-22 DIAGNOSIS — Z98.890 STATUS POST HERNIA REPAIR: ICD-10-CM

## 2022-04-22 DIAGNOSIS — R10.84 GENERALIZED ABDOMINAL PAIN: Primary | ICD-10-CM

## 2022-04-22 DIAGNOSIS — R60.0 LOWER EXTREMITY EDEMA: ICD-10-CM

## 2022-04-22 DIAGNOSIS — L72.3 SEBACEOUS CYST: ICD-10-CM

## 2022-04-22 DIAGNOSIS — Z87.19 STATUS POST HERNIA REPAIR: ICD-10-CM

## 2022-04-22 DIAGNOSIS — E78.2 MIXED HYPERLIPIDEMIA: ICD-10-CM

## 2022-04-22 DIAGNOSIS — E66.9 OBESITY (BMI 30-39.9): ICD-10-CM

## 2022-04-22 DIAGNOSIS — I10 ESSENTIAL HYPERTENSION: ICD-10-CM

## 2022-04-22 DIAGNOSIS — J30.9 ALLERGIC RHINITIS, UNSPECIFIED SEASONALITY, UNSPECIFIED TRIGGER: ICD-10-CM

## 2022-04-22 LAB
ALBUMIN SERPL-MCNC: 4.5 G/DL (ref 3.5–5.2)
ALBUMIN/GLOB SERPL: 1.4 G/DL
ALP SERPL-CCNC: 82 U/L (ref 39–117)
ALT SERPL W P-5'-P-CCNC: 25 U/L (ref 1–33)
ANION GAP SERPL CALCULATED.3IONS-SCNC: 13 MMOL/L (ref 5–15)
AST SERPL-CCNC: 27 U/L (ref 1–32)
BASOPHILS # BLD AUTO: 0.04 10*3/MM3 (ref 0–0.2)
BASOPHILS NFR BLD AUTO: 0.6 % (ref 0–1.5)
BILIRUB SERPL-MCNC: 0.2 MG/DL (ref 0–1.2)
BUN SERPL-MCNC: 26 MG/DL (ref 8–23)
BUN/CREAT SERPL: 28.9 (ref 7–25)
CALCIUM SPEC-SCNC: 10 MG/DL (ref 8.6–10.5)
CHLORIDE SERPL-SCNC: 99 MMOL/L (ref 98–107)
CHOLEST SERPL-MCNC: 145 MG/DL (ref 0–200)
CO2 SERPL-SCNC: 27 MMOL/L (ref 22–29)
CREAT SERPL-MCNC: 0.9 MG/DL (ref 0.57–1)
DEPRECATED RDW RBC AUTO: 43.2 FL (ref 37–54)
EGFRCR SERPLBLD CKD-EPI 2021: 71.5 ML/MIN/1.73
EOSINOPHIL # BLD AUTO: 0.22 10*3/MM3 (ref 0–0.4)
EOSINOPHIL NFR BLD AUTO: 3.4 % (ref 0.3–6.2)
ERYTHROCYTE [DISTWIDTH] IN BLOOD BY AUTOMATED COUNT: 14.1 % (ref 12.3–15.4)
GLOBULIN UR ELPH-MCNC: 3.2 GM/DL
GLUCOSE SERPL-MCNC: 90 MG/DL (ref 65–99)
HCT VFR BLD AUTO: 39.7 % (ref 34–46.6)
HDLC SERPL-MCNC: 52 MG/DL (ref 40–60)
HGB BLD-MCNC: 12.6 G/DL (ref 12–15.9)
IMM GRANULOCYTES # BLD AUTO: 0.01 10*3/MM3 (ref 0–0.05)
IMM GRANULOCYTES NFR BLD AUTO: 0.2 % (ref 0–0.5)
LDLC SERPL CALC-MCNC: 71 MG/DL (ref 0–100)
LDLC/HDLC SERPL: 1.3 {RATIO}
LIPASE SERPL-CCNC: 26 U/L (ref 13–60)
LYMPHOCYTES # BLD AUTO: 2.37 10*3/MM3 (ref 0.7–3.1)
LYMPHOCYTES NFR BLD AUTO: 36.5 % (ref 19.6–45.3)
MCH RBC QN AUTO: 26.9 PG (ref 26.6–33)
MCHC RBC AUTO-ENTMCNC: 31.7 G/DL (ref 31.5–35.7)
MCV RBC AUTO: 84.8 FL (ref 79–97)
MONOCYTES # BLD AUTO: 0.48 10*3/MM3 (ref 0.1–0.9)
MONOCYTES NFR BLD AUTO: 7.4 % (ref 5–12)
NEUTROPHILS NFR BLD AUTO: 3.37 10*3/MM3 (ref 1.7–7)
NEUTROPHILS NFR BLD AUTO: 51.9 % (ref 42.7–76)
NRBC BLD AUTO-RTO: 0 /100 WBC (ref 0–0.2)
PLATELET # BLD AUTO: 404 10*3/MM3 (ref 140–450)
PMV BLD AUTO: 10.3 FL (ref 6–12)
POTASSIUM SERPL-SCNC: 3.9 MMOL/L (ref 3.5–5.2)
PROT SERPL-MCNC: 7.7 G/DL (ref 6–8.5)
RBC # BLD AUTO: 4.68 10*6/MM3 (ref 3.77–5.28)
SODIUM SERPL-SCNC: 139 MMOL/L (ref 136–145)
TRIGL SERPL-MCNC: 127 MG/DL (ref 0–150)
TSH SERPL DL<=0.05 MIU/L-ACNC: 1.58 UIU/ML (ref 0.27–4.2)
VLDLC SERPL-MCNC: 22 MG/DL (ref 5–40)
WBC NRBC COR # BLD: 6.49 10*3/MM3 (ref 3.4–10.8)

## 2022-04-22 PROCEDURE — 95117 IMMUNOTHERAPY INJECTIONS: CPT | Performed by: INTERNAL MEDICINE

## 2022-04-22 PROCEDURE — 80061 LIPID PANEL: CPT | Performed by: INTERNAL MEDICINE

## 2022-04-22 PROCEDURE — 80050 GENERAL HEALTH PANEL: CPT | Performed by: INTERNAL MEDICINE

## 2022-04-22 PROCEDURE — 83690 ASSAY OF LIPASE: CPT | Performed by: INTERNAL MEDICINE

## 2022-04-22 PROCEDURE — 99214 OFFICE O/P EST MOD 30 MIN: CPT | Performed by: INTERNAL MEDICINE

## 2022-04-22 RX ORDER — PANTOPRAZOLE SODIUM 40 MG/1
40 TABLET, DELAYED RELEASE ORAL DAILY
Qty: 90 TABLET | Refills: 1 | Status: SHIPPED | OUTPATIENT
Start: 2022-04-22 | End: 2022-10-21 | Stop reason: SDUPTHER

## 2022-04-22 RX ORDER — EPINEPHRINE 0.3 MG/.3ML
INJECTION SUBCUTANEOUS
COMMUNITY
Start: 2022-03-29

## 2022-04-22 RX ORDER — POTASSIUM CHLORIDE 750 MG/1
10 CAPSULE, EXTENDED RELEASE ORAL 2 TIMES DAILY
Qty: 180 CAPSULE | Refills: 0 | Status: SHIPPED | OUTPATIENT
Start: 2022-04-22 | End: 2022-07-25

## 2022-04-22 RX ORDER — OLMESARTAN MEDOXOMIL AND HYDROCHLOROTHIAZIDE 20/12.5 20; 12.5 MG/1; MG/1
1 TABLET ORAL DAILY
Qty: 90 TABLET | Refills: 0 | Status: SHIPPED | OUTPATIENT
Start: 2022-04-22 | End: 2022-07-01 | Stop reason: SDUPTHER

## 2022-04-22 RX ORDER — SEMAGLUTIDE 0.25 MG/.5ML
0.25 INJECTION, SOLUTION SUBCUTANEOUS WEEKLY
Qty: 0.5 ML | Refills: 1 | Status: SHIPPED | OUTPATIENT
Start: 2022-04-22 | End: 2022-06-22

## 2022-04-22 RX ORDER — FUROSEMIDE 20 MG/1
20 TABLET ORAL DAILY
Qty: 90 TABLET | Refills: 1 | Status: SHIPPED | OUTPATIENT
Start: 2022-04-22 | End: 2022-10-21 | Stop reason: SDUPTHER

## 2022-04-22 RX ORDER — FENOFIBRATE 145 MG/1
145 TABLET, COATED ORAL DAILY
Qty: 90 TABLET | Refills: 1 | Status: SHIPPED | OUTPATIENT
Start: 2022-04-22 | End: 2022-10-21 | Stop reason: SDUPTHER

## 2022-04-22 RX ORDER — ONDANSETRON 4 MG/1
4 TABLET, FILM COATED ORAL EVERY 8 HOURS PRN
Status: ON HOLD | COMMUNITY
Start: 2022-03-17 | End: 2022-06-13

## 2022-04-22 NOTE — PROGRESS NOTES
Chief Complaint  Weight Loss, Medication Problem (Medication was making her sick/), and Follow-up    Subjective          India Gaytan presents to [facility]  History of Present Illness  India Gaytan is a 66-year-old female who presents today for follow-up for obesity and abdominal pain.     Patient reports that she is doing well but still has intermittent abdominal discomfort. Patient is status post hernia surgery. Notes that her abdominal discomfort has improved. She still has pain around her umbilicus where she had surgery. Describes the pain as sharp. She believes they removed the entire hernia. Denies noticing anything protruding. She denies having abnormal bowel movements. She is no longer following with the surgeon. Reports having a follow-up shortly after the surgery and was released.     Patient notes that she is supposed to have a colonoscopy, however, she has not scheduled that yet.     Patient continues to drive the bus. She does well with this, however, she does note some lower extremity edema. Patient does not wear compression stockings while at work because it gets too hot. She is unable to elevate her legs in the bus, but during her breaks, she is able to walk around. She has lasix which she takes once daily. Tries not to take it twice a day unless she has to.     Her blood pressure is well controlled today. Notes that her home pressures are usually within normal limits. Denies any episodes of chest pain or trouble breathing,     Patient was prescribed Saxenda to help with weight loss, however, she had side effects. She recalls discussing with a friend about another weight loss injectable which helped her friend lose weight.     She reports that the sebaceous cyst on her back has grown back again. Notes that her daughter tried to squeeze the cyst and there was some malodorous drainage from the cyst.     Patient is also requesting to get the  "allergy injections today.      Objective   Vital Signs:   /62   Pulse 73   Temp 97 °F (36.1 °C) (Temporal)   Resp 12   Ht 170.2 cm (67\")   Wt 107 kg (235 lb 6.4 oz)   SpO2 99%   BMI 36.87 kg/m²     Physical Exam  Vitals reviewed.   Constitutional:       Appearance: Normal appearance. She is well-developed. She is obese.   HENT:      Head: Normocephalic and atraumatic.      Right Ear: External ear normal.      Left Ear: External ear normal.   Eyes:      Conjunctiva/sclera: Conjunctivae normal.      Pupils: Pupils are equal, round, and reactive to light.   Cardiovascular:      Rate and Rhythm: Normal rate and regular rhythm.      Heart sounds: No murmur heard.    No friction rub. No gallop.   Pulmonary:      Effort: Pulmonary effort is normal.      Breath sounds: Normal breath sounds. No wheezing or rhonchi.   Musculoskeletal:      Right lower le+ Pitting Edema present.      Left lower le+ Pitting Edema present.   Skin:     General: Skin is warm and dry.      Comments: Sebaceous cyst noted to the left mid back just below the shoulder blade that is approximately 3 cm in size.    Neurological:      Mental Status: She is alert and oriented to person, place, and time.      Cranial Nerves: No cranial nerve deficit.   Psychiatric:         Mood and Affect: Affect normal.         Behavior: Behavior normal.         Thought Content: Thought content normal.        Result Review :       Common labs    Common Labsle 8/10/21 8/10/21 8/16/21 4/22/22 4/22/22 4/22/22    0026 0026  1049 1049 1049   Glucose  103 (A)   90    BUN  23   26 (A)    Creatinine  0.82   0.90    eGFR Non African Am  70       Sodium  140   139    Potassium  3.9   3.9    Chloride  103   99    Calcium  9.4   10.0    Albumin  4.00   4.50    Total Bilirubin  0.2   0.2    Alkaline Phosphatase  74   82    AST (SGOT)  23   27    ALT (SGPT)  28   25    WBC 10.35  7.19 6.49     Hemoglobin 11.1 (A)  11.3 (A) 12.6     Hematocrit 34.2  37.5 39.7   "   Platelets 420  421 404     Total Cholesterol      145   Triglycerides      127   HDL Cholesterol      52   LDL Cholesterol       71   (A) Abnormal value              Results for orders placed or performed in visit on 04/22/22   Comprehensive Metabolic Panel    Specimen: Arm, Left; Blood   Result Value Ref Range    Glucose 90 65 - 99 mg/dL    BUN 26 (H) 8 - 23 mg/dL    Creatinine 0.90 0.57 - 1.00 mg/dL    Sodium 139 136 - 145 mmol/L    Potassium 3.9 3.5 - 5.2 mmol/L    Chloride 99 98 - 107 mmol/L    CO2 27.0 22.0 - 29.0 mmol/L    Calcium 10.0 8.6 - 10.5 mg/dL    Total Protein 7.7 6.0 - 8.5 g/dL    Albumin 4.50 3.50 - 5.20 g/dL    ALT (SGPT) 25 1 - 33 U/L    AST (SGOT) 27 1 - 32 U/L    Alkaline Phosphatase 82 39 - 117 U/L    Total Bilirubin 0.2 0.0 - 1.2 mg/dL    Globulin 3.2 gm/dL    A/G Ratio 1.4 g/dL    BUN/Creatinine Ratio 28.9 (H) 7.0 - 25.0    Anion Gap 13.0 5.0 - 15.0 mmol/L    eGFR 71.5 >60.0 mL/min/1.73   TSH    Specimen: Arm, Left; Blood   Result Value Ref Range    TSH 1.580 0.270 - 4.200 uIU/mL   Lipid Panel    Specimen: Arm, Left; Blood   Result Value Ref Range    Total Cholesterol 145 0 - 200 mg/dL    Triglycerides 127 0 - 150 mg/dL    HDL Cholesterol 52 40 - 60 mg/dL    LDL Cholesterol  71 0 - 100 mg/dL    VLDL Cholesterol 22 5 - 40 mg/dL    LDL/HDL Ratio 1.30    Lipase    Specimen: Arm, Left; Blood   Result Value Ref Range    Lipase 26 13 - 60 U/L   CBC Auto Differential    Specimen: Arm, Left; Blood   Result Value Ref Range    WBC 6.49 3.40 - 10.80 10*3/mm3    RBC 4.68 3.77 - 5.28 10*6/mm3    Hemoglobin 12.6 12.0 - 15.9 g/dL    Hematocrit 39.7 34.0 - 46.6 %    MCV 84.8 79.0 - 97.0 fL    MCH 26.9 26.6 - 33.0 pg    MCHC 31.7 31.5 - 35.7 g/dL    RDW 14.1 12.3 - 15.4 %    RDW-SD 43.2 37.0 - 54.0 fl    MPV 10.3 6.0 - 12.0 fL    Platelets 404 140 - 450 10*3/mm3    Neutrophil % 51.9 42.7 - 76.0 %    Lymphocyte % 36.5 19.6 - 45.3 %    Monocyte % 7.4 5.0 - 12.0 %    Eosinophil % 3.4 0.3 - 6.2 %    Basophil %  0.6 0.0 - 1.5 %    Immature Grans % 0.2 0.0 - 0.5 %    Neutrophils, Absolute 3.37 1.70 - 7.00 10*3/mm3    Lymphocytes, Absolute 2.37 0.70 - 3.10 10*3/mm3    Monocytes, Absolute 0.48 0.10 - 0.90 10*3/mm3    Eosinophils, Absolute 0.22 0.00 - 0.40 10*3/mm3    Basophils, Absolute 0.04 0.00 - 0.20 10*3/mm3    Immature Grans, Absolute 0.01 0.00 - 0.05 10*3/mm3    nRBC 0.0 0.0 - 0.2 /100 WBC            Procedures        Assessment and Plan    Diagnoses and all orders for this visit:    1. Generalized abdominal pain (Primary)  Comments:  Will continue monitoring the pain.  Orders:  -     Ambulatory Referral to Gastroenterology  -     Lipase; Future  -     Lipase    2. Status post hernia repair  Comments:  Tolerated surgery without complications. Cleared from surgery standpoint.  Orders:  -     Ambulatory Referral to Gastroenterology    3. Lower extremity edema  Comments:  Continue on Lasix once a day and if needed can take up to two a day. Keep walking regularly and wear compression stockings.    4. Essential hypertension  Comments:  Stable and well controlled. Will obtain labs today. Continue on medications as prescribed. Will adjust meds based on lab results.   Orders:  -     Comprehensive Metabolic Panel  -     CBC & Differential  -     TSH  -     Lipid Panel  -     olmesartan-hydrochlorothiazide (BENICAR HCT) 20-12.5 MG per tablet; Take 1 tablet by mouth Daily.  Dispense: 90 tablet; Refill: 0    5. Mixed hyperlipidemia  Comments:  Will obtain a lipid panel today. Continue on regimen. Will adjust meds based on lab results.   Orders:  -     Lipid Panel    6. Obesity (BMI 30-39.9)  Comments:  home diet and exercise    7. Sebaceous cyst  -     Ambulatory Referral to General Surgery    8. Allergic rhinitis, unspecified seasonality, unspecified trigger  Comments:  Will administer allergy injection today.   Orders:  -     patient supplied allergy injection    Other orders  -     Semaglutide-Weight Management (Wegovy) 0.25  MG/0.5ML solution auto-injector; Inject 0.25 mg under the skin into the appropriate area as directed 1 (One) Time Per Week.  Dispense: 0.5 mL; Refill: 1  -     pantoprazole (PROTONIX) 40 MG EC tablet; Take 1 tablet by mouth Daily.  Dispense: 90 tablet; Refill: 1  -     potassium chloride (MICRO-K) 10 MEQ CR capsule; Take 1 capsule by mouth 2 (Two) Times a Day.  Dispense: 180 capsule; Refill: 0  -     furosemide (LASIX) 20 MG tablet; Take 1 tablet by mouth Daily for 90 days. PT STATED USUALLY ONLY TAKE 1 TIME A DAY  Dispense: 90 tablet; Refill: 1  -     fenofibrate (TRICOR) 145 MG tablet; Take 1 tablet by mouth Daily.  Dispense: 90 tablet; Refill: 1                  Follow Up   Return in about 6 months (around 10/22/2022).  Patient was given instructions and counseling regarding her condition or for health maintenance advice. Please see specific information pulled into the AVS if appropriate.     Transcribed from ambient dictation for Lisa Jones MD by John FRANKLIN Rep.  04/22/22   14:43 EDT    Patient verbalized consent to the visit recording.  I have personally performed the services described in this document as transcribed by the above individual, and it is both accurate and complete.  Lisa Jones MD  4/25/2022  00:23 EDT

## 2022-04-27 ENCOUNTER — PRIOR AUTHORIZATION (OUTPATIENT)
Dept: INTERNAL MEDICINE | Facility: CLINIC | Age: 67
End: 2022-04-27

## 2022-04-29 ENCOUNTER — CLINICAL SUPPORT (OUTPATIENT)
Dept: INTERNAL MEDICINE | Facility: CLINIC | Age: 67
End: 2022-04-29

## 2022-04-29 DIAGNOSIS — J30.9 ALLERGIC RHINITIS, UNSPECIFIED SEASONALITY, UNSPECIFIED TRIGGER: Primary | ICD-10-CM

## 2022-04-29 PROCEDURE — 95117 IMMUNOTHERAPY INJECTIONS: CPT | Performed by: INTERNAL MEDICINE

## 2022-05-02 NOTE — TELEPHONE ENCOUNTER
Your PA request has been approved.     Additional information will be provided in the approval communication.

## 2022-05-06 ENCOUNTER — CLINICAL SUPPORT (OUTPATIENT)
Dept: INTERNAL MEDICINE | Facility: CLINIC | Age: 67
End: 2022-05-06

## 2022-05-06 DIAGNOSIS — J30.9 ALLERGIC RHINITIS, UNSPECIFIED SEASONALITY, UNSPECIFIED TRIGGER: Primary | ICD-10-CM

## 2022-05-06 PROCEDURE — 36415 COLL VENOUS BLD VENIPUNCTURE: CPT | Performed by: INTERNAL MEDICINE

## 2022-05-13 ENCOUNTER — CLINICAL SUPPORT (OUTPATIENT)
Dept: INTERNAL MEDICINE | Facility: CLINIC | Age: 67
End: 2022-05-13

## 2022-05-13 DIAGNOSIS — J30.9 ALLERGIC RHINITIS, UNSPECIFIED SEASONALITY, UNSPECIFIED TRIGGER: Primary | ICD-10-CM

## 2022-05-13 PROCEDURE — 95117 IMMUNOTHERAPY INJECTIONS: CPT | Performed by: INTERNAL MEDICINE

## 2022-05-17 ENCOUNTER — PREP FOR SURGERY (OUTPATIENT)
Dept: OTHER | Facility: HOSPITAL | Age: 67
End: 2022-05-17

## 2022-05-17 ENCOUNTER — OFFICE VISIT (OUTPATIENT)
Dept: SURGERY | Facility: CLINIC | Age: 67
End: 2022-05-17

## 2022-05-17 VITALS — HEIGHT: 67 IN | BODY MASS INDEX: 36.95 KG/M2 | RESPIRATION RATE: 16 BRPM | WEIGHT: 235.4 LBS

## 2022-05-17 DIAGNOSIS — R22.2 MASS ON BACK: Primary | ICD-10-CM

## 2022-05-17 PROCEDURE — 99213 OFFICE O/P EST LOW 20 MIN: CPT | Performed by: SURGERY

## 2022-05-17 RX ORDER — CEFAZOLIN SODIUM 2 G/100ML
2 INJECTION, SOLUTION INTRAVENOUS ONCE
Status: CANCELLED | OUTPATIENT
Start: 2022-05-17 | End: 2022-05-17

## 2022-05-17 RX ORDER — SODIUM CHLORIDE 0.9 % (FLUSH) 0.9 %
10 SYRINGE (ML) INJECTION EVERY 12 HOURS SCHEDULED
Status: CANCELLED | OUTPATIENT
Start: 2022-05-17

## 2022-05-17 RX ORDER — SODIUM CHLORIDE 0.9 % (FLUSH) 0.9 %
10 SYRINGE (ML) INJECTION AS NEEDED
Status: CANCELLED | OUTPATIENT
Start: 2022-05-17

## 2022-05-17 NOTE — PROGRESS NOTES
Chief Complaint: Cyst    Subjective         History of Present Illness  India Gaytan is a 66 y.o. female presents to [facility] GENERAL SURGERY. The patient is to be seen for evaluation of a sebaceous cyst.      Sebaceous cyst  The patient reports she had her sebaceous cyst removed approximately 2 years ago. She states the cyst has recurred and mentions that it is present in between her shoulders. She states that She mentions Dr. Lisa Jones tried to remove some of its content and felt like there was something rotten inside of it. She denies having tenderness when it is being pressed. She states that the cyst is occasionally painful. The patient states she does have a small scar from her previous cyst removal.      Objective     Past Medical History:   Diagnosis Date   • Anemia     ASYMPTOMATIC   • Anesthesia     SLOW TO WAKE UP POSTOP    • Benign essential hypertension    • Chronic allergic rhinitis    • Essential hypertension    • Gout 05/30/2019    greatly improved   • High cholesterol    • Hyperlipidemia    • Lower extremity edema 09/18/2017    cont compression stockings can do lymphedema therapy if she wishes in the future   • Lumbar back pain with radiculopathy affecting right lower extremity 06/12/2015   • Mixed hyperlipidemia    • Osteoarthritis of right hip 06/12/2015   • Pain of right hip joint 09/18/2017    has chronic arthritis of that hip with tendonitits seen previously in MRI however she doesn't wish to do pt at this time, will try stretches that she has done in the past at home currently   • Seasonal allergies    • Ventral hernia CURRENTLY       Past Surgical History:   Procedure Laterality Date   • ABDOMINAL HYSTERECTOMY     • BACK SURGERY     • COLON SURGERY      RELATED TO ABSCESS   • COLONOSCOPY  2006, 2019, 2020   • HERNIA REPAIR  12/2020    VENTRAL HERNIA    • HYSTERECTOMY  1996   • OOPHORECTOMY  1999   • ROTATOR CUFF REPAIR Left 2011   • VENTRAL HERNIA REPAIR N/A  8/2/2021    Procedure: VENTRAL HERNIA REPAIR LAPAROSCOPIC WITH DAVINCI ROBOT;  Surgeon: Eliseo Cannon MD;  [facility] MAIN OR;  Service: Robotics - DaVinci;  Laterality: N/A;   • VERTEBROPLASTY  08/07/2020    T11         Current Outpatient Medications:   •  albuterol sulfate  (90 Base) MCG/ACT inhaler, Inhale 2 puffs Every 4 (Four) Hours As Needed for Wheezing or Shortness of Air (USES FOR ALLEGERIES)., Disp: , Rfl:   •  azelastine (ASTELIN) 0.1 % nasal spray, SPRAY ONE TO TWO SPRAYS IN each nostril TWICE DAILY, Disp: , Rfl:   •  EPINEPHrine (EPIPEN) 0.3 MG/0.3ML solution auto-injector injection, Use as directed for acute allergic reaction., Disp: , Rfl:   •  fenofibrate (TRICOR) 145 MG tablet, Take 1 tablet by mouth Daily., Disp: 90 tablet, Rfl: 1  •  fluticasone (FLONASE) 50 MCG/ACT nasal spray, SPRAY TWO SPRAY IN each nostril EVERY DAY, Disp: , Rfl:   •  furosemide (LASIX) 20 MG tablet, Take 1 tablet by mouth Daily for 90 days. PT STATED USUALLY ONLY TAKE 1 TIME A DAY, Disp: 90 tablet, Rfl: 1  •  Insulin Pen Needle (Pen Needles) 32G X 4 MM misc, 1 each 1 (One) Time As Needed (weekly) for up to 1 dose., Disp: 100 each, Rfl: 0  •  montelukast (SINGULAIR) 10 MG tablet, take 1 tablet (10 mg) by oral route once daily in the evening, Disp: , Rfl:   •  olmesartan-hydrochlorothiazide (BENICAR HCT) 20-12.5 MG per tablet, Take 1 tablet by mouth Daily., Disp: 90 tablet, Rfl: 0  •  ondansetron (ZOFRAN) 4 MG tablet, Take 4 mg by mouth Every 8 (Eight) Hours As Needed., Disp: , Rfl:   •  pantoprazole (PROTONIX) 40 MG EC tablet, Take 1 tablet by mouth Daily., Disp: 90 tablet, Rfl: 1  •  Pataday 0.7 % solution, Administer 1 drop to both eyes Daily As Needed. NOT USED IN A WHILE, Disp: , Rfl:   •  potassium chloride (MICRO-K) 10 MEQ CR capsule, Take 1 capsule by mouth 2 (Two) Times a Day., Disp: 180 capsule, Rfl: 0  •  Semaglutide-Weight Management (Wegovy) 0.25 MG/0.5ML solution auto-injector, Inject 0.25 mg  "under the skin into the appropriate area as directed 1 (One) Time Per Week., Disp: 0.5 mL, Rfl: 1    Allergies   Allergen Reactions   • Levofloxacin Nausea And Vomiting     Pt reported also caused her to pass out         Family History   Problem Relation Age of Onset   • Heart disease Mother    • Heart disease Father        Social History     Socioeconomic History   • Marital status:    Tobacco Use   • Smoking status: Never Smoker   • Smokeless tobacco: Never Used   Vaping Use   • Vaping Use: Never used   Substance and Sexual Activity   • Alcohol use: Yes     Comment: Rarely drinks   • Drug use: Never   • Sexual activity: Defer       Vital Signs:   Resp 16   Ht 170.2 cm (67\")   Wt 107 kg (235 lb 6.4 oz)   BMI 36.87 kg/m²      Physical Exam  Constitutional:       Appearance: Normal appearance. She is well-developed and normal weight.      Comments: No acute distress.   Cardiovascular:      Heart sounds: No murmur heard.  Pulmonary:      Effort: Pulmonary effort is normal.      Breath sounds: Normal air entry.   Skin:     Comments: She has a 2.5 x 2.5 cm mass in the center of her back, which appears to be consistent with a sebaceous cyst.   Neurological:      Mental Status: She is alert and oriented to person, place, and time.      Motor: Motor function is intact.          Result Review :            Procedures        Assessment and Plan {CC Problem List  Visit Diagnosis  ROS  Review (Popup)  Health Maintenance  Quality  BestPractice  Medications  SmartSets  SnapShot Encounters  Media :23}   There are no diagnoses linked to this encounter.       1. Patient with a symptomatic and enlarging sebaceous cyst  - We will plan for excision in the operating room. Risks, benefits, and alternatives were discussed extensively. All questions were answered. Patient voiced understanding and agreed to proceed with the above plan.      Follow Up   No follow-ups on file.  Patient was given instructions and " counseling regarding her condition or for health maintenance advice. Please see specific information pulled into the AVS if appropriate.       Transcribed from ambient dictation for Eliseo Cannon MD by MIQUEL GARG.  05/17/22   17:06 EDT    Patient verbalized consent to the visit recording.

## 2022-05-27 ENCOUNTER — CLINICAL SUPPORT (OUTPATIENT)
Dept: INTERNAL MEDICINE | Facility: CLINIC | Age: 67
End: 2022-05-27

## 2022-05-27 DIAGNOSIS — J30.9 ALLERGIC RHINITIS, UNSPECIFIED SEASONALITY, UNSPECIFIED TRIGGER: Primary | ICD-10-CM

## 2022-05-27 PROCEDURE — 95117 IMMUNOTHERAPY INJECTIONS: CPT | Performed by: INTERNAL MEDICINE

## 2022-06-03 ENCOUNTER — CLINICAL SUPPORT (OUTPATIENT)
Dept: INTERNAL MEDICINE | Facility: CLINIC | Age: 67
End: 2022-06-03

## 2022-06-03 DIAGNOSIS — J30.2 SEASONAL ALLERGIC RHINITIS, UNSPECIFIED TRIGGER: Primary | ICD-10-CM

## 2022-06-03 PROCEDURE — 95117 IMMUNOTHERAPY INJECTIONS: CPT | Performed by: INTERNAL MEDICINE

## 2022-06-07 NOTE — PRE-PROCEDURE INSTRUCTIONS
Patient instructed to have no food past midnight, clears up to 2 hours prior to arrival time. Patient instructed to wear no lotions, jewelry or piercing's day of surgery.  Patient to shower with surgical soap am of surgery.  Patient to take  protonix and tricor am of surgery.

## 2022-06-10 ENCOUNTER — CLINICAL SUPPORT (OUTPATIENT)
Dept: INTERNAL MEDICINE | Facility: CLINIC | Age: 67
End: 2022-06-10

## 2022-06-10 DIAGNOSIS — J30.2 SEASONAL ALLERGIC RHINITIS, UNSPECIFIED TRIGGER: Primary | ICD-10-CM

## 2022-06-10 PROCEDURE — 95117 IMMUNOTHERAPY INJECTIONS: CPT | Performed by: INTERNAL MEDICINE

## 2022-06-13 ENCOUNTER — ANESTHESIA (OUTPATIENT)
Dept: PERIOP | Facility: HOSPITAL | Age: 67
End: 2022-06-13

## 2022-06-13 ENCOUNTER — HOSPITAL ENCOUNTER (OUTPATIENT)
Facility: HOSPITAL | Age: 67
Setting detail: HOSPITAL OUTPATIENT SURGERY
Discharge: HOME | End: 2022-06-13
Attending: SURGERY | Admitting: SURGERY

## 2022-06-13 ENCOUNTER — ANESTHESIA EVENT (OUTPATIENT)
Dept: PERIOP | Facility: HOSPITAL | Age: 67
End: 2022-06-13

## 2022-06-13 VITALS
HEIGHT: 66 IN | TEMPERATURE: 97 F | WEIGHT: 234.35 LBS | OXYGEN SATURATION: 95 % | BODY MASS INDEX: 37.66 KG/M2 | SYSTOLIC BLOOD PRESSURE: 135 MMHG | DIASTOLIC BLOOD PRESSURE: 54 MMHG | RESPIRATION RATE: 18 BRPM | HEART RATE: 54 BPM

## 2022-06-13 DIAGNOSIS — R22.2 MASS ON BACK: ICD-10-CM

## 2022-06-13 PROCEDURE — 88304 TISSUE EXAM BY PATHOLOGIST: CPT | Performed by: SURGERY

## 2022-06-13 PROCEDURE — 25010000002 CEFAZOLIN IN DEXTROSE 2-4 GM/100ML-% SOLUTION: Performed by: SURGERY

## 2022-06-13 PROCEDURE — 11403 EXC TR-EXT B9+MARG 2.1-3CM: CPT | Performed by: SURGERY

## 2022-06-13 PROCEDURE — 0 LIDOCAINE 1 % SOLUTION 20 ML VIAL: Performed by: SURGERY

## 2022-06-13 PROCEDURE — 25010000002 PROPOFOL 10 MG/ML EMULSION: Performed by: NURSE ANESTHETIST, CERTIFIED REGISTERED

## 2022-06-13 PROCEDURE — 25010000002 FENTANYL CITRATE (PF) 50 MCG/ML SOLUTION: Performed by: NURSE ANESTHETIST, CERTIFIED REGISTERED

## 2022-06-13 PROCEDURE — 25010000002 MIDAZOLAM PER 1 MG: Performed by: ANESTHESIOLOGY

## 2022-06-13 PROCEDURE — 25010000002 ONDANSETRON PER 1 MG: Performed by: NURSE ANESTHETIST, CERTIFIED REGISTERED

## 2022-06-13 RX ORDER — HYDROCODONE BITARTRATE AND ACETAMINOPHEN 5; 325 MG/1; MG/1
1 TABLET ORAL ONCE AS NEEDED
Status: DISCONTINUED | OUTPATIENT
Start: 2022-06-13 | End: 2022-06-13 | Stop reason: HOSPADM

## 2022-06-13 RX ORDER — FENTANYL CITRATE 50 UG/ML
INJECTION, SOLUTION INTRAMUSCULAR; INTRAVENOUS AS NEEDED
Status: DISCONTINUED | OUTPATIENT
Start: 2022-06-13 | End: 2022-06-13 | Stop reason: SURG

## 2022-06-13 RX ORDER — ACETAMINOPHEN 500 MG
1000 TABLET ORAL ONCE
Status: COMPLETED | OUTPATIENT
Start: 2022-06-13 | End: 2022-06-13

## 2022-06-13 RX ORDER — SODIUM CHLORIDE 0.9 % (FLUSH) 0.9 %
10 SYRINGE (ML) INJECTION EVERY 12 HOURS SCHEDULED
Status: DISCONTINUED | OUTPATIENT
Start: 2022-06-13 | End: 2022-06-13 | Stop reason: HOSPADM

## 2022-06-13 RX ORDER — ONDANSETRON 2 MG/ML
4 INJECTION INTRAMUSCULAR; INTRAVENOUS ONCE AS NEEDED
Status: DISCONTINUED | OUTPATIENT
Start: 2022-06-13 | End: 2022-06-13 | Stop reason: HOSPADM

## 2022-06-13 RX ORDER — CEFAZOLIN SODIUM 2 G/100ML
2 INJECTION, SOLUTION INTRAVENOUS ONCE
Status: COMPLETED | OUTPATIENT
Start: 2022-06-13 | End: 2022-06-13

## 2022-06-13 RX ORDER — SODIUM CHLORIDE 0.9 % (FLUSH) 0.9 %
10 SYRINGE (ML) INJECTION AS NEEDED
Status: DISCONTINUED | OUTPATIENT
Start: 2022-06-13 | End: 2022-06-13 | Stop reason: HOSPADM

## 2022-06-13 RX ORDER — MEPERIDINE HYDROCHLORIDE 25 MG/ML
12.5 INJECTION INTRAMUSCULAR; INTRAVENOUS; SUBCUTANEOUS
Status: DISCONTINUED | OUTPATIENT
Start: 2022-06-13 | End: 2022-06-13 | Stop reason: HOSPADM

## 2022-06-13 RX ORDER — SODIUM CHLORIDE, SODIUM LACTATE, POTASSIUM CHLORIDE, CALCIUM CHLORIDE 600; 310; 30; 20 MG/100ML; MG/100ML; MG/100ML; MG/100ML
9 INJECTION, SOLUTION INTRAVENOUS CONTINUOUS PRN
Status: DISCONTINUED | OUTPATIENT
Start: 2022-06-13 | End: 2022-06-13 | Stop reason: HOSPADM

## 2022-06-13 RX ORDER — ONDANSETRON 2 MG/ML
INJECTION INTRAMUSCULAR; INTRAVENOUS AS NEEDED
Status: DISCONTINUED | OUTPATIENT
Start: 2022-06-13 | End: 2022-06-13 | Stop reason: SURG

## 2022-06-13 RX ORDER — PROPOFOL 10 MG/ML
VIAL (ML) INTRAVENOUS CONTINUOUS PRN
Status: DISCONTINUED | OUTPATIENT
Start: 2022-06-13 | End: 2022-06-13 | Stop reason: SURG

## 2022-06-13 RX ORDER — HYDROCODONE BITARTRATE AND ACETAMINOPHEN 5; 325 MG/1; MG/1
1-2 TABLET ORAL EVERY 4 HOURS PRN
Qty: 6 TABLET | Refills: 0 | Status: SHIPPED | OUTPATIENT
Start: 2022-06-13 | End: 2022-08-10

## 2022-06-13 RX ORDER — OXYCODONE HYDROCHLORIDE 5 MG/1
5 TABLET ORAL
Status: DISCONTINUED | OUTPATIENT
Start: 2022-06-13 | End: 2022-06-13 | Stop reason: HOSPADM

## 2022-06-13 RX ORDER — PROMETHAZINE HYDROCHLORIDE 12.5 MG/1
25 TABLET ORAL ONCE AS NEEDED
Status: DISCONTINUED | OUTPATIENT
Start: 2022-06-13 | End: 2022-06-13 | Stop reason: HOSPADM

## 2022-06-13 RX ORDER — MIDAZOLAM HYDROCHLORIDE 1 MG/ML
2 INJECTION INTRAMUSCULAR; INTRAVENOUS ONCE
Status: COMPLETED | OUTPATIENT
Start: 2022-06-13 | End: 2022-06-13

## 2022-06-13 RX ORDER — PROMETHAZINE HYDROCHLORIDE 25 MG/1
25 SUPPOSITORY RECTAL ONCE AS NEEDED
Status: DISCONTINUED | OUTPATIENT
Start: 2022-06-13 | End: 2022-06-13 | Stop reason: HOSPADM

## 2022-06-13 RX ORDER — EPHEDRINE SULFATE 50 MG/ML
INJECTION, SOLUTION INTRAVENOUS AS NEEDED
Status: DISCONTINUED | OUTPATIENT
Start: 2022-06-13 | End: 2022-06-13 | Stop reason: SURG

## 2022-06-13 RX ORDER — LIDOCAINE HYDROCHLORIDE 20 MG/ML
INJECTION, SOLUTION EPIDURAL; INFILTRATION; INTRACAUDAL; PERINEURAL AS NEEDED
Status: DISCONTINUED | OUTPATIENT
Start: 2022-06-13 | End: 2022-06-13 | Stop reason: SURG

## 2022-06-13 RX ORDER — MAGNESIUM HYDROXIDE 1200 MG/15ML
LIQUID ORAL AS NEEDED
Status: DISCONTINUED | OUTPATIENT
Start: 2022-06-13 | End: 2022-06-13 | Stop reason: HOSPADM

## 2022-06-13 RX ADMIN — CEFAZOLIN SODIUM 2 G: 2 INJECTION, SOLUTION INTRAVENOUS at 09:24

## 2022-06-13 RX ADMIN — FENTANYL CITRATE 50 MCG: 50 INJECTION, SOLUTION INTRAMUSCULAR; INTRAVENOUS at 09:44

## 2022-06-13 RX ADMIN — LIDOCAINE HYDROCHLORIDE 100 MG: 20 INJECTION, SOLUTION EPIDURAL; INFILTRATION; INTRACAUDAL; PERINEURAL at 09:23

## 2022-06-13 RX ADMIN — SODIUM CHLORIDE, POTASSIUM CHLORIDE, SODIUM LACTATE AND CALCIUM CHLORIDE: 600; 310; 30; 20 INJECTION, SOLUTION INTRAVENOUS at 09:59

## 2022-06-13 RX ADMIN — PROPOFOL 200 MCG/KG/MIN: 10 INJECTION, EMULSION INTRAVENOUS at 09:23

## 2022-06-13 RX ADMIN — EPHEDRINE SULFATE 15 MG: 50 INJECTION INTRAVENOUS at 09:38

## 2022-06-13 RX ADMIN — ONDANSETRON 4 MG: 2 INJECTION INTRAMUSCULAR; INTRAVENOUS at 09:55

## 2022-06-13 RX ADMIN — FENTANYL CITRATE 50 MCG: 50 INJECTION, SOLUTION INTRAMUSCULAR; INTRAVENOUS at 09:24

## 2022-06-13 RX ADMIN — EPHEDRINE SULFATE 20 MG: 50 INJECTION INTRAVENOUS at 09:28

## 2022-06-13 RX ADMIN — MIDAZOLAM HYDROCHLORIDE 2 MG: 1 INJECTION, SOLUTION INTRAMUSCULAR; INTRAVENOUS at 09:00

## 2022-06-13 RX ADMIN — SODIUM CHLORIDE, POTASSIUM CHLORIDE, SODIUM LACTATE AND CALCIUM CHLORIDE 9 ML/HR: 600; 310; 30; 20 INJECTION, SOLUTION INTRAVENOUS at 08:36

## 2022-06-13 RX ADMIN — ACETAMINOPHEN 1000 MG: 500 TABLET ORAL at 08:36

## 2022-06-13 RX ADMIN — EPHEDRINE SULFATE 15 MG: 50 INJECTION INTRAVENOUS at 09:46

## 2022-06-13 NOTE — ANESTHESIA POSTPROCEDURE EVALUATION
Patient: India Gaytan    Procedure Summary     Date: 06/13/22 Room / Location: [facility] OR 04 / [facility] MAIN OR    Anesthesia Start: 0914 Anesthesia Stop: 1011    Procedure: EXCISION BACK MASS (N/A ) Diagnosis:       Mass on back      (Mass on back [R22.2])    Surgeons: Eliseo Cannon MD Provider: Marito Roy MD    Anesthesia Type: general ASA Status: 2          Anesthesia Type: general    Vitals  Vitals Value Taken Time   /63 06/13/22 1035   Temp 36.8 °C (98.3 °F) 06/13/22 1010   Pulse 72 06/13/22 1036   Resp 18 06/13/22 1025   SpO2 96 % 06/13/22 1036   Vitals shown include unvalidated device data.        Post Anesthesia Care and Evaluation    Patient location during evaluation: bedside  Patient participation: complete - patient participated  Level of consciousness: awake  Pain management: adequate    Airway patency: patent  Anesthetic complications: No anesthetic complications  PONV Status: none  Cardiovascular status: acceptable  Respiratory status: acceptable  Hydration status: acceptable    Comments: An Anesthesiologist personally participated in the most demanding procedures (including induction and emergence if applicable) in the anesthesia plan, monitored the course of anesthesia administration at frequent intervals and remained physically present and available for immediate diagnosis and treatment of emergencies.

## 2022-06-13 NOTE — OP NOTE
Preoperative diagnosis: Back mass    Postoperative diagnosis: Same    Procedure: Excision of back mass    Surgeon: Davis    Anesthesia: General    Assistant: Milla Lopez    EBL: 3    Specimens: Back mass    Complications: None    Indications: 66-year-old female with an enlarging and symptomatic back mass.  Presents today for elective excision.    PROCEDURE    Patient was seen in the preoperative holding area.  Risks, benefits, alternatives of the operation were again discussed in detail.  All of the patient and family's questions were answered.  They voiced understanding, and agreed to proceed.    Patient was brought to the operating room.  Monitoring devices and Dilip stockings were placed.  Anesthesia was administered.  Patient was prepped and draped in standard surgical fashion.  After prepping and draping a timeout was performed to verify both the correct patient and correct procedure.    We began by injecting local anesthesia around the area of the mass.  Incision was made over the top of the mass.  As we were dissecting down we entered the mass itself.  We had return of sebaceous appearing material.  The material was evacuated.  We were then able to grasp the cyst wall and began dissecting it free.  The cyst itself was very friable and came out in multiple pieces.  Once the cyst was completely excised and we had normal healthy appearing fatty soft tissue, we made sure that everything was hemostatic with electrocautery.  We then copiously irrigated the wound bed.  The wound was then closed with subcuticular 4-0 Vicryl stitch and dressed with Exofin skin glue.    The patient was then aroused from anesthesia, and taken off the OR table, and taken to PACU in stable condition.  Sponge, needle, and instrument counts were correct x2.  Milla Lopez was present for the entire procedure and helped in all portions of the case.    The operative note was dictated with the help of the dragon dictation system.

## 2022-06-13 NOTE — ANESTHESIA PREPROCEDURE EVALUATION
Anesthesia Evaluation     Patient summary reviewed and Nursing notes reviewed   history of anesthetic complications: PONV  NPO Solid Status: > 8 hours  NPO Liquid Status: > 2 hours           Airway   Mallampati: II  TM distance: >3 FB  Neck ROM: full  No difficulty expected  Dental      Pulmonary - negative pulmonary ROS and normal exam    breath sounds clear to auscultation  Cardiovascular - normal exam  Exercise tolerance: good (4-7 METS)    Rhythm: regular  Rate: normal    (+) hypertension, valvular problems/murmurs, hyperlipidemia,       Neuro/Psych- negative ROS  GI/Hepatic/Renal/Endo - negative ROS     Musculoskeletal (-) negative ROS    Abdominal    Substance History - negative use     OB/GYN negative ob/gyn ROS         Other - negative ROS                       Anesthesia Plan    ASA 2     general     (Total IV Anesthesia    Patient understands anesthesia not responsible for dental damage.  )  intravenous induction     Anesthetic plan, risks, benefits, and alternatives have been provided, discussed and informed consent has been obtained with: patient.    Plan discussed with CRNA.        CODE STATUS:

## 2022-06-13 NOTE — DISCHARGE INSTRUCTIONS
DISCHARGE INSTRUCTIONS  SURGICAL / AMBULATORY  PROCEDURES      For your surgery you had:  General anesthesia (you may have a sore throat for the first 24 hours)  IV sedation.  Local anesthesia  Monitored anesthesia Care  You received a medicated patch for nausea prevention today (behind your ear). It is recommended that you remove it 24-48 hours post-operatively. It must be removed within 72 hours.   You have received an anesthesia medication today that can cause hormonal forms of birth control to be ineffective. You should use a different form of birth control (to prevent pregnancy) for 7 days.   You may experience dizziness, drowsiness, or light-headedness for several hours following surgery/procedure.  Do not stay alone today or tonight.  Limit your activity for 24 hours.  Resume your diet slowly.  Follow whatever special dietary instructions you may have been given by your doctor.  You should not drive or operate machinery, drink alcohol, or sign legally binding documents for 24 hours or while you are taking pain medication.  Last dose of pain medication was given at:   .  NOTIFY YOUR DOCTOR IF YOU EXPERIENCE ANY OF THE FOLLOWING:  Temperature greater than 101 degrees Fahrenheit  Shaking Chills  Redness or excessive drainage from incision  Nausea, vomiting and/or pain that is not controlled by prescribed medications  Increase in bleeding or bleeding that is excessive  Unable to urinate in 6 hours after surgery  If unable to reach your doctor, please go to the closest Emergency Room  You may begin dressing changes:     [] in 24 hours   [] in 48 hours   [] Other:    You may remove Band-Aid/dressing tomorrow.  You may shower or bathe   .  Apply an ice pack 24-48 hours.  Medications per physician instructions as indicated on Discharge Medication Information Sheet.  You should see   for follow-up care   on   .  Phone number:       SPECIAL INSTRUCTIONS:  Tylenol 1384

## 2022-06-14 LAB
CYTO UR: NORMAL
LAB AP CASE REPORT: NORMAL
LAB AP CLINICAL INFORMATION: NORMAL
PATH REPORT.FINAL DX SPEC: NORMAL
PATH REPORT.GROSS SPEC: NORMAL

## 2022-06-17 ENCOUNTER — CLINICAL SUPPORT (OUTPATIENT)
Dept: INTERNAL MEDICINE | Facility: CLINIC | Age: 67
End: 2022-06-17

## 2022-06-17 DIAGNOSIS — J30.2 SEASONAL ALLERGIC RHINITIS, UNSPECIFIED TRIGGER: Primary | ICD-10-CM

## 2022-06-17 PROCEDURE — 95117 IMMUNOTHERAPY INJECTIONS: CPT | Performed by: INTERNAL MEDICINE

## 2022-06-20 ENCOUNTER — OFFICE VISIT (OUTPATIENT)
Dept: SURGERY | Facility: CLINIC | Age: 67
End: 2022-06-20

## 2022-06-20 VITALS — RESPIRATION RATE: 16 BRPM | BODY MASS INDEX: 37.45 KG/M2 | WEIGHT: 233 LBS | HEART RATE: 69 BPM | HEIGHT: 66 IN

## 2022-06-20 DIAGNOSIS — L72.0 EIC (EPIDERMAL INCLUSION CYST): Primary | ICD-10-CM

## 2022-06-20 PROCEDURE — 99024 POSTOP FOLLOW-UP VISIT: CPT | Performed by: NURSE PRACTITIONER

## 2022-06-20 NOTE — PROGRESS NOTES
Chief Complaint: Post-op Follow-up    Subjective      Follow-up visit       History of Present Illness  India Gaytan is a 66 y.o. female presents to [facility] GENERAL SURGERY for follow-up visit.    Patient presents today for follow-up visit after undergoing an excision of a back mass on 6/13/2022 performed by Dr. Eliseo Cannon.    Pathology reveals a EIC.    Pathology:  Clinical Information    Comment:    Mass on back      Final Diagnosis   Back mass, excision:               - Epidermal inclusion cyst   Electronically signed by Leandro Blanc MD      Patient denies any postoperative complications.    Denies any fever or chills.  Objective     Past Medical History:   Diagnosis Date   • Anemia     ASYMPTOMATIC   • Anesthesia     SLOW TO WAKE UP POSTOP    • Benign essential hypertension    • Chronic allergic rhinitis    • Essential hypertension    • Gout 05/30/2019    greatly improved   • High cholesterol    • Hyperlipidemia    • Lower extremity edema 09/18/2017    cont compression stockings can do lymphedema therapy if she wishes in the future   • Lumbar back pain with radiculopathy affecting right lower extremity 06/12/2015   • Mixed hyperlipidemia    • Osteoarthritis of right hip 06/12/2015   • Pain of right hip joint 09/18/2017    has chronic arthritis of that hip with tendonitits seen previously in MRI however she doesn't wish to do pt at this time, will try stretches that she has done in the past at home currently   • PONV (postoperative nausea and vomiting)    • Seasonal allergies    • Ventral hernia CURRENTLY       Past Surgical History:   Procedure Laterality Date   • ABDOMINAL HYSTERECTOMY     • BACK SURGERY     • COLON SURGERY      RELATED TO ABSCESS   • COLONOSCOPY  2006, 2019, 2020   • EXCISION LESION N/A 6/13/2022    Procedure: EXCISION BACK MASS;  Surgeon: Eliseo Cannon MD;  Location: [facility];  Service: General;  Laterality: N/A;   • HERNIA REPAIR  12/2020    VENTRAL  HERNIA    • HYSTERECTOMY  1996   • OOPHORECTOMY  1999   • ROTATOR CUFF REPAIR Left 2011   • VENTRAL HERNIA REPAIR N/A 8/2/2021    Procedure: VENTRAL HERNIA REPAIR LAPAROSCOPIC WITH DAVINCI ROBOT;  Surgeon: Eliseo Cannon MD;  Location: [facility] MAIN OR;  Service: Robotics - DaVinci;  Laterality: N/A;   • VERTEBROPLASTY  08/07/2020    T11       Outpatient Medications Marked as Taking for the 6/20/22 encounter (Office Visit) with Dora Fernandes APRN   Medication Sig Dispense Refill   • albuterol sulfate  (90 Base) MCG/ACT inhaler Inhale 2 puffs Every 4 (Four) Hours As Needed for Wheezing or Shortness of Air (USES FOR ALLEGERIES).     • azelastine (ASTELIN) 0.1 % nasal spray SPRAY ONE TO TWO SPRAYS IN each nostril TWICE DAILY     • EPINEPHrine (EPIPEN) 0.3 MG/0.3ML solution auto-injector injection Use as directed for acute allergic reaction.     • fenofibrate (TRICOR) 145 MG tablet Take 1 tablet by mouth Daily. 90 tablet 1   • fluticasone (FLONASE) 50 MCG/ACT nasal spray SPRAY TWO SPRAY IN each nostril EVERY DAY     • furosemide (LASIX) 20 MG tablet Take 1 tablet by mouth Daily for 90 days. PT STATED USUALLY ONLY TAKE 1 TIME A DAY 90 tablet 1   • HYDROcodone-acetaminophen (NORCO) 5-325 MG per tablet Take 1-2 tablets by mouth Every 4 (Four) Hours As Needed (Pain). 6 tablet 0   • Insulin Pen Needle (Pen Needles) 32G X 4 MM misc 1 each 1 (One) Time As Needed (weekly) for up to 1 dose. 100 each 0   • montelukast (SINGULAIR) 10 MG tablet take 1 tablet (10 mg) by oral route once daily in the evening     • olmesartan-hydrochlorothiazide (BENICAR HCT) 20-12.5 MG per tablet Take 1 tablet by mouth Daily. 90 tablet 0   • pantoprazole (PROTONIX) 40 MG EC tablet Take 1 tablet by mouth Daily. 90 tablet 1   • Pataday 0.7 % solution Administer 1 drop to both eyes Daily As Needed. NOT USED IN A WHILE     • potassium chloride (MICRO-K) 10 MEQ CR capsule Take 1 capsule by mouth 2 (Two) Times a Day. 180 capsule 0   •  "Semaglutide-Weight Management (Wegovy) 0.25 MG/0.5ML solution auto-injector Inject 0.25 mg under the skin into the appropriate area as directed 1 (One) Time Per Week. 0.5 mL 1       Allergies   Allergen Reactions   • Levofloxacin Nausea And Vomiting     Pt reported also caused her to pass out         Family History   Problem Relation Age of Onset   • Heart disease Mother    • Heart disease Father        Social History     Socioeconomic History   • Marital status:    Tobacco Use   • Smoking status: Never Smoker   • Smokeless tobacco: Never Used   Vaping Use   • Vaping Use: Never used   Substance and Sexual Activity   • Alcohol use: Yes     Comment: Rarely drinks   • Drug use: Never   • Sexual activity: Defer       Review of Systems   Constitutional: Negative for chills and fever.   Skin: Negative for color change, dry skin, pallor, rash, skin lesions, wound and bruise.        Vital Signs:   Pulse 69   Resp 16   Ht 167.6 cm (66\")   Wt 106 kg (233 lb)   BMI 37.61 kg/m²      Physical Exam  Constitutional:       Appearance: She is obese.   HENT:      Head: Normocephalic.   Cardiovascular:      Rate and Rhythm: Normal rate.   Pulmonary:      Effort: Pulmonary effort is normal.   Abdominal:      Palpations: Abdomen is soft.   Skin:     General: Skin is warm and dry.      Comments: Upper back: Surgical incisions clean dry and intact without erythema.  Surgical adhesive still present.    Small seroma noted.   Neurological:      General: No focal deficit present.      Mental Status: She is alert and oriented to person, place, and time.   Psychiatric:         Mood and Affect: Mood normal.         Thought Content: Thought content normal.          Result Review :          []  Laboratory  []  Radiology  [x]  Pathology  []  Microbiology  []  EKG/Telemetry   []  Cardiology/Vascular   [x]  Old records  Today I reviewed Dr. Cannon's operative and pathology.     Assessment and Plan    Diagnoses and all orders for this " visit:    1. EIC (epidermal inclusion cyst) (Primary)        Follow Up   Return if symptoms worsen or fail to improve.     Seek medical emergency services:  Fever greater than 101 associated with chills.  Extreme pain.    Patient was given instructions and counseling regarding her condition or for health maintenance advice. Please see specific information pulled into the AVS if appropriate.

## 2022-06-22 RX ORDER — SEMAGLUTIDE 0.25 MG/.5ML
0.25 INJECTION, SOLUTION SUBCUTANEOUS WEEKLY
Qty: 2 ML | Refills: 1 | Status: SHIPPED | OUTPATIENT
Start: 2022-06-22 | End: 2022-08-10

## 2022-06-24 ENCOUNTER — CLINICAL SUPPORT (OUTPATIENT)
Dept: INTERNAL MEDICINE | Facility: CLINIC | Age: 67
End: 2022-06-24

## 2022-06-24 DIAGNOSIS — J30.9 ALLERGIC RHINITIS, UNSPECIFIED SEASONALITY, UNSPECIFIED TRIGGER: Primary | ICD-10-CM

## 2022-07-01 ENCOUNTER — TELEPHONE (OUTPATIENT)
Dept: GASTROENTEROLOGY | Facility: CLINIC | Age: 67
End: 2022-07-01

## 2022-07-01 ENCOUNTER — CLINICAL SUPPORT (OUTPATIENT)
Dept: INTERNAL MEDICINE | Facility: CLINIC | Age: 67
End: 2022-07-01

## 2022-07-01 DIAGNOSIS — J30.9 ALLERGIC RHINITIS, UNSPECIFIED SEASONALITY, UNSPECIFIED TRIGGER: Primary | ICD-10-CM

## 2022-07-01 DIAGNOSIS — I10 ESSENTIAL HYPERTENSION: ICD-10-CM

## 2022-07-01 PROCEDURE — 95117 IMMUNOTHERAPY INJECTIONS: CPT | Performed by: INTERNAL MEDICINE

## 2022-07-01 RX ORDER — OLMESARTAN MEDOXOMIL AND HYDROCHLOROTHIAZIDE 20/12.5 20; 12.5 MG/1; MG/1
1 TABLET ORAL DAILY
Qty: 90 TABLET | Refills: 0 | Status: SHIPPED | OUTPATIENT
Start: 2022-07-01 | End: 2022-10-21 | Stop reason: SDUPTHER

## 2022-07-01 NOTE — TELEPHONE ENCOUNTER
Patient was here for allergy shots and stated that she is almost out of her blood pressure medication, sent refill to pharmacy per pt's request. Sandip Nelson RN

## 2022-07-05 ENCOUNTER — TELEPHONE (OUTPATIENT)
Dept: SURGERY | Facility: CLINIC | Age: 67
End: 2022-07-05

## 2022-07-05 NOTE — TELEPHONE ENCOUNTER
"Back mass excision on 06/13/22. Pt called and said it is still draining. Her sister looked at it and it looks like it was open some with a scab on it, \"it looks like it is healing from the inside out\". It is not red or hot, only open and draining. Drainage has slowed down and it is not everyday. It is about a \"finger-tip deep\".     Pt wants to know if this is normal or does she need to have it looked at.      [phone]  "

## 2022-07-06 NOTE — TELEPHONE ENCOUNTER
Per Dr Cannon he is happy to see her if she want's to be seen, but that it sounds ok. Patient has been notified.

## 2022-07-08 ENCOUNTER — CLINICAL SUPPORT (OUTPATIENT)
Dept: INTERNAL MEDICINE | Facility: CLINIC | Age: 67
End: 2022-07-08

## 2022-07-08 DIAGNOSIS — J30.9 ALLERGIC RHINITIS, UNSPECIFIED SEASONALITY, UNSPECIFIED TRIGGER: Primary | ICD-10-CM

## 2022-07-08 PROCEDURE — 95117 IMMUNOTHERAPY INJECTIONS: CPT | Performed by: INTERNAL MEDICINE

## 2022-07-09 ENCOUNTER — TELEPHONE (OUTPATIENT)
Dept: INTERNAL MEDICINE | Facility: CLINIC | Age: 67
End: 2022-07-09

## 2022-07-09 NOTE — TELEPHONE ENCOUNTER
Patient came into office yesterday for allergy injections and asked if the Wegovy dosage could be incresed because she hasn't been able to tell a difference? She also stated that this medicine has been on back order for 3 weeks.

## 2022-07-11 NOTE — TELEPHONE ENCOUNTER
Please call the patient and let her know that the Wegovy has been on backorder for quite some time and I am not sure it has resolved - this is nationwide to my knowledge.  She is to stay on the starting dose of Wegovy for 4 weeks and then increase after 4 weeks if tolerated and no side effects occurred.  It looks like she just started the Wegovy on June 22 and therefore she would not qualify for increasing of the dosage at this time.  She is welcome to make an appointment to discuss further increasing the medication if she would like.  However until she can get for 4 weeks of being on the dosing medication we can increase her to the next step.  At this time I am going to strongly suggest making diet and exercise her priority until we can get the medication supply figured out.

## 2022-07-11 NOTE — TELEPHONE ENCOUNTER
Called and informed of message per provider.   Patient verbalized Understanding, had no follow up questions or concerns at this time.

## 2022-07-12 ENCOUNTER — CLINICAL SUPPORT (OUTPATIENT)
Dept: INTERNAL MEDICINE | Facility: CLINIC | Age: 67
End: 2022-07-12

## 2022-07-12 DIAGNOSIS — J30.9 ALLERGIC RHINITIS, UNSPECIFIED SEASONALITY, UNSPECIFIED TRIGGER: Primary | ICD-10-CM

## 2022-07-12 PROCEDURE — 95117 IMMUNOTHERAPY INJECTIONS: CPT | Performed by: INTERNAL MEDICINE

## 2022-07-22 ENCOUNTER — CLINICAL SUPPORT (OUTPATIENT)
Dept: INTERNAL MEDICINE | Facility: CLINIC | Age: 67
End: 2022-07-22

## 2022-07-22 DIAGNOSIS — Z51.6 DESENSITIZATION TO ALLERGENS: Primary | ICD-10-CM

## 2022-07-22 DIAGNOSIS — J30.89 ENVIRONMENTAL AND SEASONAL ALLERGIES: ICD-10-CM

## 2022-07-22 PROCEDURE — 95117 IMMUNOTHERAPY INJECTIONS: CPT | Performed by: INTERNAL MEDICINE

## 2022-07-25 RX ORDER — POTASSIUM CHLORIDE 750 MG/1
10 CAPSULE, EXTENDED RELEASE ORAL 2 TIMES DAILY
Qty: 180 CAPSULE | Refills: 0 | Status: SHIPPED | OUTPATIENT
Start: 2022-07-25 | End: 2022-10-21 | Stop reason: SDUPTHER

## 2022-07-29 ENCOUNTER — CLINICAL SUPPORT (OUTPATIENT)
Dept: INTERNAL MEDICINE | Facility: CLINIC | Age: 67
End: 2022-07-29

## 2022-07-29 DIAGNOSIS — J30.9 ALLERGIC RHINITIS, UNSPECIFIED SEASONALITY, UNSPECIFIED TRIGGER: Primary | ICD-10-CM

## 2022-07-29 PROCEDURE — 95117 IMMUNOTHERAPY INJECTIONS: CPT | Performed by: INTERNAL MEDICINE

## 2022-08-10 PROCEDURE — U0004 COV-19 TEST NON-CDC HGH THRU: HCPCS | Performed by: FAMILY MEDICINE

## 2022-08-10 PROCEDURE — U0005 INFEC AGEN DETEC AMPLI PROBE: HCPCS | Performed by: FAMILY MEDICINE

## 2022-08-10 NOTE — PROGRESS NOTES
Chief Complaint   Needing colonoscopy   History of Present Illness   You have chosen to receive care through a telephone visit. Do you consent to use a telephone visit for your medical care today? Yes    Informed patient that as visit is being performed as a telehealth visit there will be no opportunity to obtain vital signs or perform physical exam.  Due to this there is unfortunately a possibility that things may be missed that would typically be noticed during a traditionally visit.  Patient is aware of this possibility and agrees to proceed with telehealth.  Patient states there is no other person present for this phone call.     India Gaytan is a 67 y.o. female who presents to [facility] for follow-up with a history of diverticular disease, colon resection, personal history of colon polyps in need of a screening colonoscopy.  Patient reports her last colonoscopy was attempted in 2020 but could not be completed as there was stricture and narrowing from her diverticular disease and patient was seen by Dr. Cannon and had surgical intervention following.  Patient denies any symptoms at this time including abdominal pain, change in bowel habits, hematochezia, melena, fever, nausea, vomiting, weight loss, night sweats, hematemesis.  Patient denies any family history of colon cancer.  Patient did have 1 colon polyp removed in 2019.  Patient has a history of reflux and continues on Protonix 40 mg daily that she tries to take on an as-needed basis.  She does watch her diet for spicy foods.  She has not had an EGD prior and she is struggled from reflux for 7 to 8 years.    Colonoscopy: Review of the patient's most recent colonoscopy performed by Dr. Rangel on 04.17.2020 several nonbleeding diverticula sigmoid colon.  Stricture or narrowing at 30 cm from injury related to diverticular disease made passage of colonoscopy challenging and therefore exam was discontinued.  Remainder  of colon tissue appears normal.  Patient referred to Dr. Cannon    Patient underwent colovaginal fistula takedown 5/4/2020 by Dr. Cannon    Labs preformed on 04.22.2022    Results       Result Review :       CMP    CMP 4/22/22   Glucose 90   BUN 26 (A)   Creatinine 0.90   Sodium 139   Potassium 3.9   Chloride 99   Calcium 10.0   Albumin 4.50   Total Bilirubin 0.2   Alkaline Phosphatase 82   AST (SGOT) 27   ALT (SGPT) 25   (A) Abnormal value            CBC    CBC 4/22/22   WBC 6.49   RBC 4.68   Hemoglobin 12.6   Hematocrit 39.7   MCV 84.8   MCH 26.9   MCHC 31.7   RDW 14.1   Platelets 404                         Past Medical History       Past Medical History:   Diagnosis Date   • Anemia     ASYMPTOMATIC   • Anesthesia     SLOW TO WAKE UP POSTOP    • Benign essential hypertension    • Chronic allergic rhinitis    • Essential hypertension    • Gout 05/30/2019    greatly improved   • High cholesterol    • Hyperlipidemia    • Lower extremity edema 09/18/2017    cont compression stockings can do lymphedema therapy if she wishes in the future   • Lumbar back pain with radiculopathy affecting right lower extremity 06/12/2015   • Mixed hyperlipidemia    • Osteoarthritis of right hip 06/12/2015   • Pain of right hip joint 09/18/2017    has chronic arthritis of that hip with tendonitits seen previously in MRI however she doesn't wish to do pt at this time, will try stretches that she has done in the past at home currently   • PONV (postoperative nausea and vomiting)    • Seasonal allergies    • Ventral hernia CURRENTLY       Past Surgical History:   Procedure Laterality Date   • ABDOMINAL HYSTERECTOMY     • BACK SURGERY     • COLON RESECTION  2020    DR CANNON   • COLON SURGERY      RELATED TO ABSCESS   • COLONOSCOPY  2006, 2019, 2020   • EXCISION LESION N/A 06/13/2022    Procedure: EXCISION BACK MASS;  Surgeon: Eliseo Cannon MD;  Location: [facility] MAIN OR;  Service: General;  Laterality: N/A;   • HERNIA REPAIR   12/2020    VENTRAL HERNIA    • HYSTERECTOMY  1996   • OOPHORECTOMY  1999   • ROTATOR CUFF REPAIR Left 2011   • VENTRAL HERNIA REPAIR N/A 08/02/2021    Procedure: VENTRAL HERNIA REPAIR LAPAROSCOPIC WITH [employer] ROBOT;  Surgeon: Eliseo Cannon MD;  Location: [facility] MAIN OR;  Service: Robotics - DaVinci;  Laterality: N/A;   • VERTEBROPLASTY  08/07/2020    T11         Current Outpatient Medications:   •  albuterol sulfate  (90 Base) MCG/ACT inhaler, Inhale 2 puffs Every 4 (Four) Hours As Needed for Wheezing or Shortness of Air (USES FOR ALLEGERIES)., Disp: , Rfl:   •  Allergy Relief 5 MG tablet, Take 5 mg by mouth Daily., Disp: , Rfl:   •  amoxicillin-clavulanate (AUGMENTIN) 875-125 MG per tablet, Take 1 tablet by mouth 2 (Two) Times a Day., Disp: 14 tablet, Rfl: 0  •  azelastine (ASTELIN) 0.1 % nasal spray, SPRAY ONE TO TWO SPRAYS IN each nostril TWICE DAILY, Disp: , Rfl:   •  EPINEPHrine (EPIPEN) 0.3 MG/0.3ML solution auto-injector injection, Use as directed for acute allergic reaction., Disp: , Rfl:   •  fenofibrate (TRICOR) 145 MG tablet, Take 1 tablet by mouth Daily., Disp: 90 tablet, Rfl: 1  •  fluticasone (FLONASE) 50 MCG/ACT nasal spray, SPRAY TWO SPRAY IN each nostril EVERY DAY, Disp: , Rfl:   •  guaifenesin-dextromethorphan (MUCINEX DM)  MG tablet sustained-release 12 hour tablet, Take 1 tablet by mouth 2 (Two) Times a Day As Needed (Cough and chest congestion)., Disp: 14 tablet, Rfl: 0  •  montelukast (SINGULAIR) 10 MG tablet, take 1 tablet (10 mg) by oral route once daily in the evening, Disp: , Rfl:   •  olmesartan-hydrochlorothiazide (BENICAR HCT) 20-12.5 MG per tablet, Take 1 tablet by mouth Daily., Disp: 90 tablet, Rfl: 0  •  pantoprazole (PROTONIX) 40 MG EC tablet, Take 1 tablet by mouth Daily., Disp: 90 tablet, Rfl: 1  •  Pataday 0.7 % solution, Administer 1 drop to both eyes Daily As Needed. NOT USED IN A WHILE, Disp: , Rfl:   •  potassium chloride (MICRO-K) 10 MEQ CR capsule, Take 1  capsule by mouth 2 (Two) Times a Day., Disp: 180 capsule, Rfl: 0  •  furosemide (LASIX) 20 MG tablet, Take 1 tablet by mouth Daily for 90 days. PT STATED USUALLY ONLY TAKE 1 TIME A DAY, Disp: 90 tablet, Rfl: 1  •  Sodium Sulfate-Mag Sulfate-KCl (Sutab) 6608-435-699 MG tablet, Take 12 tablets by mouth Take As Directed., Disp: 24 tablet, Rfl: 0     Allergies   Allergen Reactions   • Levofloxacin Nausea And Vomiting     Pt reported also caused her to pass out        Family History   Problem Relation Age of Onset   • Heart disease Mother    • Heart disease Father    • Colon cancer Neg Hx         Social History     Social History Narrative   • Not on file       Objective     Vital Signs:   There were no vitals taken for this visit.  Telehealth visit    Physical Exam  Gen: well-nourished, no acute distress  HENT: atraumatic, normocephalic  Eyes: extraocular movements intact, no scleral icterus  Lung: breathing comfortably, no cough  Skin: no visible rash, no lesions  Neuro: grossly oriented to person, place, and time. no facial droop   Psych: normal mood and affect      Assessment & Plan          Assessment and Plan    Diagnoses and all orders for this visit:    1. Diverticulosis (Primary)    2. Personal history of colonic polyps    3. Encounter for screening for malignant neoplasm of colon    4. Gastroesophageal reflux disease, unspecified whether esophagitis present    Other orders  -     Sodium Sulfate-Mag Sulfate-KCl (Sutab) [phone] MG tablet; Take 12 tablets by mouth Take As Directed.  Dispense: 24 tablet; Refill: 0      67-year-old female presenting to the office via telehealth with a history of diverticulosis requiring surgical intervention, personal history of colon polyps, and reflux.  I have recommended that the patient undergo further evaluation with an EGD and colonoscopy.  I have discussed this procedure in detail with the patient.  I have discussed the risks, benefits and alternatives.  I have discussed  the risk of anesthesia, bleeding and perforation.  Patient understands these risks, benefits and alternatives and wishes to proceed with colonoscopy only.  I will schedule her at her earliest convenience.  Patient will follow-up in the office after endoscopy if needed.  Patient agreed with this plan will call with any questions or concerns.            Follow Up       Follow Up   Return for Follow up after endoscopy in office.  Patient was given instructions and counseling regarding her condition or for health maintenance advice. Please see specific information pulled into the AVS if appropriate.

## 2022-08-12 ENCOUNTER — CLINICAL SUPPORT (OUTPATIENT)
Dept: INTERNAL MEDICINE | Facility: CLINIC | Age: 67
End: 2022-08-12

## 2022-08-12 DIAGNOSIS — J30.9 ALLERGIC RHINITIS, UNSPECIFIED SEASONALITY, UNSPECIFIED TRIGGER: Primary | ICD-10-CM

## 2022-08-12 PROCEDURE — 95117 IMMUNOTHERAPY INJECTIONS: CPT | Performed by: INTERNAL MEDICINE

## 2022-08-15 ENCOUNTER — TELEPHONE (OUTPATIENT)
Dept: GASTROENTEROLOGY | Facility: CLINIC | Age: 67
End: 2022-08-15

## 2022-08-15 ENCOUNTER — TELEMEDICINE (OUTPATIENT)
Dept: GASTROENTEROLOGY | Facility: CLINIC | Age: 67
End: 2022-08-15

## 2022-08-15 ENCOUNTER — PREP FOR SURGERY (OUTPATIENT)
Dept: OTHER | Facility: HOSPITAL | Age: 67
End: 2022-08-15

## 2022-08-15 DIAGNOSIS — Z86.010 PERSONAL HISTORY OF COLONIC POLYPS: ICD-10-CM

## 2022-08-15 DIAGNOSIS — K21.9 GASTROESOPHAGEAL REFLUX DISEASE, UNSPECIFIED WHETHER ESOPHAGITIS PRESENT: ICD-10-CM

## 2022-08-15 DIAGNOSIS — Z12.11 ENCOUNTER FOR SCREENING FOR MALIGNANT NEOPLASM OF COLON: ICD-10-CM

## 2022-08-15 DIAGNOSIS — K57.90 DIVERTICULOSIS: Primary | ICD-10-CM

## 2022-08-15 PROBLEM — Z86.0100 PERSONAL HISTORY OF COLONIC POLYPS: Status: ACTIVE | Noted: 2022-08-15

## 2022-08-15 PROCEDURE — 99214 OFFICE O/P EST MOD 30 MIN: CPT | Performed by: NURSE PRACTITIONER

## 2022-08-15 RX ORDER — SOD SULF/POT CHLORIDE/MAG SULF 1.479 G
12 TABLET ORAL TAKE AS DIRECTED
Qty: 24 TABLET | Refills: 0 | Status: SHIPPED | OUTPATIENT
Start: 2022-08-15 | End: 2022-10-21

## 2022-08-15 NOTE — PATIENT INSTRUCTIONS
Food Choices for Gastroesophageal Reflux Disease, Adult  When you have gastroesophageal reflux disease (GERD), the foods you eat and your eating habits are very important. Choosing the right foods can help ease your discomfort. Think about working with a food expert (dietitian) to help you make good choices.  What are tips for following this plan?  Reading food labels  Look for foods that are low in saturated fat. Foods that may help with your symptoms include:  Foods that have less than 5% of daily value (DV) of fat.  Foods that have 0 grams of trans fat.  Cooking  Do not mosquera your food.  Cook your food by baking, steaming, grilling, or broiling. These are all methods that do not need a lot of fat for cooking.  To add flavor, try to use herbs that are low in spice and acidity.  Meal planning    Choose healthy foods that are low in fat, such as:  Fruits and vegetables.  Whole grains.  Low-fat dairy products.  Lean meats, fish, and poultry.  Eat small meals often instead of eating 3 large meals each day. Eat your meals slowly in a place where you are relaxed. Avoid bending over or lying down until 2-3 hours after eating.  Limit high-fat foods such as fatty meats or fried foods.  Limit your intake of fatty foods, such as oils, butter, and shortening.  Avoid the following as told by your doctor:  Foods that cause symptoms. These may be different for different people. Keep a food diary to keep track of foods that cause symptoms.  Alcohol.  Drinking a lot of liquid with meals.  Eating meals during the 2-3 hours before bed.    Lifestyle  Stay at a healthy weight. Ask your doctor what weight is healthy for you. If you need to lose weight, work with your doctor to do so safely.  Exercise for at least 30 minutes on 5 or more days each week, or as told by your doctor.  Wear loose-fitting clothes.  Do not smoke or use any products that contain nicotine or tobacco. If you need help quitting, ask your doctor.  Sleep with the head  of your bed higher than your feet. Use a wedge under the mattress or blocks under the bed frame to raise the head of the bed.  Chew sugar-free gum after meals.  What foods should eat?    Eat a healthy, well-balanced diet of fruits, vegetables, whole grains, low-fat dairy products, lean meats, fish, and poultry. Each person is different.  Foods that may cause symptoms in one person may not cause any symptoms in another person. Work with your doctor to find foods that are safe for you.  The items listed above may not be a complete list of what you can eat and drink. Contact a food expert for more options.  What foods should I avoid?  Limiting some of these foods may help in managing the symptoms of GERD. Everyone is different. Talk with a food expert or your doctor to help you find the exact foods to avoid, if any.  Fruits  Any fruits prepared with added fat. Any fruits that cause symptoms. For some people, this may include citrus fruits, such as oranges, grapefruit, pineapple, and cristobal.  Vegetables  Deep-fried vegetables. French fries. Any vegetables prepared with added fat. Any vegetables that cause symptoms. For some people, this may include tomatoes and tomato products, chili peppers, onions and garlic, and horseradish.  Grains  Pastries or quick breads with added fat.  Meats and other proteins  High-fat meats, such as fatty beef or pork, hot dogs, ribs, ham, sausage, salami, and garber. Fried meat or protein, including fried fish and fried chicken. Nuts and nut butters, in large amounts.  Dairy  Whole milk and chocolate milk. Sour cream. Cream. Ice cream. Cream cheese. Milkshakes.  Fats and oils  Butter. Margarine. Shortening. Ghee.  Beverages  Coffee and tea, with or without caffeine. Carbonated beverages. Sodas. Energy drinks. Fruit juice made with acidic fruits, such as orange or grapefruit. Tomato juice. Alcoholic drinks.  Sweets and desserts  Chocolate and cocoa. Donuts.  Seasonings and condiments  Pepper.  Peppermint and spearmint. Added salt. Any condiments, herbs, or seasonings that cause symptoms. For some people, this may include solis, hot sauce, or vinegar-based salad dressings.  The items listed above may not be a complete list of what you should not eat and drink. Contact a food expert for more options.  Questions to ask your doctor  Diet and lifestyle changes are often the first steps that are taken to manage symptoms of GERD. If diet and lifestyle changes do not help, talk with your doctor about taking medicines.  Where to find more information  [employer] Gastrointestinal [employer]: aboutgerd.org  Summary  When you have GERD, food and lifestyle choices are very important in easing your symptoms.  Eat small meals often instead of 3 large meals a day. Eat your meals slowly and in a place where you are relaxed.  Avoid bending over or lying down until 2-3 hours after eating.  Limit high-fat foods such as fatty meats or fried foods.  This information is not intended to replace advice given to you by your health care provider. Make sure you discuss any questions you have with your health care provider.  Document Revised: 06/28/2021 Document Reviewed: 06/28/2021  Elsevier Patient Education © 2021 [employer] Inc[employer]

## 2022-08-15 NOTE — TELEPHONE ENCOUNTER
Patient called back after her video visit and requested to add EGD as discussed with [place] ANTONIO.  Conirmed with Georgia, it is ok to add EGD to colon scope.

## 2022-08-25 ENCOUNTER — CLINICAL SUPPORT (OUTPATIENT)
Dept: INTERNAL MEDICINE | Facility: CLINIC | Age: 67
End: 2022-08-25

## 2022-08-25 DIAGNOSIS — J30.9 ALLERGIC RHINITIS, UNSPECIFIED SEASONALITY, UNSPECIFIED TRIGGER: Primary | ICD-10-CM

## 2022-08-25 PROCEDURE — 95117 IMMUNOTHERAPY INJECTIONS: CPT | Performed by: INTERNAL MEDICINE

## 2022-09-09 ENCOUNTER — CLINICAL SUPPORT (OUTPATIENT)
Dept: INTERNAL MEDICINE | Facility: CLINIC | Age: 67
End: 2022-09-09

## 2022-09-09 DIAGNOSIS — J30.9 ALLERGIC RHINITIS, UNSPECIFIED SEASONALITY, UNSPECIFIED TRIGGER: Primary | ICD-10-CM

## 2022-09-09 PROCEDURE — 95117 IMMUNOTHERAPY INJECTIONS: CPT | Performed by: INTERNAL MEDICINE

## 2022-09-22 ENCOUNTER — CLINICAL SUPPORT (OUTPATIENT)
Dept: INTERNAL MEDICINE | Facility: CLINIC | Age: 67
End: 2022-09-22

## 2022-09-22 DIAGNOSIS — J30.9 ALLERGIC RHINITIS, UNSPECIFIED SEASONALITY, UNSPECIFIED TRIGGER: Primary | ICD-10-CM

## 2022-09-22 PROCEDURE — 95117 IMMUNOTHERAPY INJECTIONS: CPT | Performed by: INTERNAL MEDICINE

## 2022-10-11 ENCOUNTER — CLINICAL SUPPORT (OUTPATIENT)
Dept: INTERNAL MEDICINE | Facility: CLINIC | Age: 67
End: 2022-10-11

## 2022-10-11 DIAGNOSIS — J30.2 SEASONAL ALLERGIC RHINITIS, UNSPECIFIED TRIGGER: Primary | ICD-10-CM

## 2022-10-11 PROCEDURE — 95117 IMMUNOTHERAPY INJECTIONS: CPT | Performed by: INTERNAL MEDICINE

## 2022-10-21 ENCOUNTER — OFFICE VISIT (OUTPATIENT)
Dept: INTERNAL MEDICINE | Facility: CLINIC | Age: 67
End: 2022-10-21

## 2022-10-21 VITALS
HEART RATE: 75 BPM | HEIGHT: 66 IN | WEIGHT: 244 LBS | RESPIRATION RATE: 18 BRPM | DIASTOLIC BLOOD PRESSURE: 80 MMHG | BODY MASS INDEX: 39.21 KG/M2 | TEMPERATURE: 97 F | OXYGEN SATURATION: 96 % | SYSTOLIC BLOOD PRESSURE: 138 MMHG

## 2022-10-21 DIAGNOSIS — N64.9 BREAST LESION: ICD-10-CM

## 2022-10-21 DIAGNOSIS — Z78.0 POSTMENOPAUSE: ICD-10-CM

## 2022-10-21 DIAGNOSIS — M79.605 LEFT LEG PAIN: Primary | ICD-10-CM

## 2022-10-21 DIAGNOSIS — E66.9 OBESITY (BMI 30-39.9): ICD-10-CM

## 2022-10-21 DIAGNOSIS — I10 ESSENTIAL HYPERTENSION: ICD-10-CM

## 2022-10-21 LAB
ALBUMIN SERPL-MCNC: 4.3 G/DL (ref 3.5–5.2)
ALBUMIN/GLOB SERPL: 1.4 G/DL
ALP SERPL-CCNC: 84 U/L (ref 39–117)
ALT SERPL W P-5'-P-CCNC: 20 U/L (ref 1–33)
ANION GAP SERPL CALCULATED.3IONS-SCNC: 12.3 MMOL/L (ref 5–15)
AST SERPL-CCNC: 21 U/L (ref 1–32)
BASOPHILS # BLD AUTO: 0.04 10*3/MM3 (ref 0–0.2)
BASOPHILS NFR BLD AUTO: 0.5 % (ref 0–1.5)
BILIRUB SERPL-MCNC: 0.3 MG/DL (ref 0–1.2)
BUN SERPL-MCNC: 27 MG/DL (ref 8–23)
BUN/CREAT SERPL: 27.3 (ref 7–25)
CALCIUM SPEC-SCNC: 10.2 MG/DL (ref 8.6–10.5)
CHLORIDE SERPL-SCNC: 97 MMOL/L (ref 98–107)
CHOLEST SERPL-MCNC: 158 MG/DL (ref 0–200)
CO2 SERPL-SCNC: 28.7 MMOL/L (ref 22–29)
CREAT SERPL-MCNC: 0.99 MG/DL (ref 0.57–1)
DEPRECATED RDW RBC AUTO: 41.1 FL (ref 37–54)
EGFRCR SERPLBLD CKD-EPI 2021: 63.4 ML/MIN/1.73
EOSINOPHIL # BLD AUTO: 0.24 10*3/MM3 (ref 0–0.4)
EOSINOPHIL NFR BLD AUTO: 3.1 % (ref 0.3–6.2)
ERYTHROCYTE [DISTWIDTH] IN BLOOD BY AUTOMATED COUNT: 13.5 % (ref 12.3–15.4)
GLOBULIN UR ELPH-MCNC: 3.1 GM/DL
GLUCOSE SERPL-MCNC: 84 MG/DL (ref 65–99)
HCT VFR BLD AUTO: 38.5 % (ref 34–46.6)
HDLC SERPL-MCNC: 56 MG/DL (ref 40–60)
HGB BLD-MCNC: 12.7 G/DL (ref 12–15.9)
IMM GRANULOCYTES # BLD AUTO: 0.02 10*3/MM3 (ref 0–0.05)
IMM GRANULOCYTES NFR BLD AUTO: 0.3 % (ref 0–0.5)
LDLC SERPL CALC-MCNC: 75 MG/DL (ref 0–100)
LDLC/HDLC SERPL: 1.26 {RATIO}
LYMPHOCYTES # BLD AUTO: 2.32 10*3/MM3 (ref 0.7–3.1)
LYMPHOCYTES NFR BLD AUTO: 30.2 % (ref 19.6–45.3)
MCH RBC QN AUTO: 27.4 PG (ref 26.6–33)
MCHC RBC AUTO-ENTMCNC: 33 G/DL (ref 31.5–35.7)
MCV RBC AUTO: 83.2 FL (ref 79–97)
MONOCYTES # BLD AUTO: 0.42 10*3/MM3 (ref 0.1–0.9)
MONOCYTES NFR BLD AUTO: 5.5 % (ref 5–12)
NEUTROPHILS NFR BLD AUTO: 4.64 10*3/MM3 (ref 1.7–7)
NEUTROPHILS NFR BLD AUTO: 60.4 % (ref 42.7–76)
NRBC BLD AUTO-RTO: 0 /100 WBC (ref 0–0.2)
PLATELET # BLD AUTO: 434 10*3/MM3 (ref 140–450)
PMV BLD AUTO: 10.2 FL (ref 6–12)
POTASSIUM SERPL-SCNC: 3.6 MMOL/L (ref 3.5–5.2)
PROT SERPL-MCNC: 7.4 G/DL (ref 6–8.5)
RBC # BLD AUTO: 4.63 10*6/MM3 (ref 3.77–5.28)
SODIUM SERPL-SCNC: 138 MMOL/L (ref 136–145)
TRIGL SERPL-MCNC: 157 MG/DL (ref 0–150)
TSH SERPL DL<=0.05 MIU/L-ACNC: 1.38 UIU/ML (ref 0.27–4.2)
VLDLC SERPL-MCNC: 27 MG/DL (ref 5–40)
WBC NRBC COR # BLD: 7.68 10*3/MM3 (ref 3.4–10.8)

## 2022-10-21 PROCEDURE — 82306 VITAMIN D 25 HYDROXY: CPT | Performed by: INTERNAL MEDICINE

## 2022-10-21 PROCEDURE — 80061 LIPID PANEL: CPT | Performed by: INTERNAL MEDICINE

## 2022-10-21 PROCEDURE — 99214 OFFICE O/P EST MOD 30 MIN: CPT | Performed by: INTERNAL MEDICINE

## 2022-10-21 PROCEDURE — 90662 IIV NO PRSV INCREASED AG IM: CPT | Performed by: INTERNAL MEDICINE

## 2022-10-21 PROCEDURE — 90471 IMMUNIZATION ADMIN: CPT | Performed by: INTERNAL MEDICINE

## 2022-10-21 PROCEDURE — 80050 GENERAL HEALTH PANEL: CPT | Performed by: INTERNAL MEDICINE

## 2022-10-21 PROCEDURE — 83036 HEMOGLOBIN GLYCOSYLATED A1C: CPT | Performed by: INTERNAL MEDICINE

## 2022-10-21 RX ORDER — POTASSIUM CHLORIDE 750 MG/1
10 CAPSULE, EXTENDED RELEASE ORAL 2 TIMES DAILY
Qty: 180 CAPSULE | Refills: 1 | Status: SHIPPED | OUTPATIENT
Start: 2022-10-21

## 2022-10-21 RX ORDER — IBUPROFEN 600 MG/1
600 TABLET ORAL EVERY 6 HOURS PRN
Qty: 90 TABLET | Refills: 1 | Status: SHIPPED | OUTPATIENT
Start: 2022-10-21

## 2022-10-21 RX ORDER — OLMESARTAN MEDOXOMIL AND HYDROCHLOROTHIAZIDE 20/12.5 20; 12.5 MG/1; MG/1
1 TABLET ORAL DAILY
Qty: 90 TABLET | Refills: 1 | Status: SHIPPED | OUTPATIENT
Start: 2022-10-21

## 2022-10-21 RX ORDER — FENOFIBRATE 145 MG/1
145 TABLET, COATED ORAL DAILY
Qty: 90 TABLET | Refills: 1 | Status: SHIPPED | OUTPATIENT
Start: 2022-10-21

## 2022-10-21 RX ORDER — PANTOPRAZOLE SODIUM 40 MG/1
40 TABLET, DELAYED RELEASE ORAL DAILY
Qty: 90 TABLET | Refills: 1 | Status: SHIPPED | OUTPATIENT
Start: 2022-10-21

## 2022-10-21 RX ORDER — FUROSEMIDE 20 MG/1
20 TABLET ORAL DAILY
Qty: 90 TABLET | Refills: 1 | Status: SHIPPED | OUTPATIENT
Start: 2022-10-21 | End: 2023-01-19

## 2022-10-21 NOTE — PROGRESS NOTES
Chief Complaint  Hypertension, Hyperlipidemia, Obesity, Heartburn, Follow-up (6 Month), Ankle Pain (Right Ankle-2 weeks Pain and Swelling), and Breast Problem (Left breast itching and swelling- 2 weeks )    Subjective          India Gaytan presents to [facility]  History of Present Illness  India Gaytan is a 66-year-old female who presents today for evaluation of right foot edema.    Edema of right lower extremity  The patient has been experiencing swelling and pain of her right foot for approximately 3 weeks. She has a history of edema of her bilateral lower extremities.  Her symptoms resolved for 1 to 2 weeks but have returned this week. The swelling is localized to the patient's right foot and is described to be very painful. Yesterday, 10/20/2022, she noticed the swelling had worsened. She took 2 Tylenol 500 mg when she woke up yesterday morning and later took 3 ibuprofen at 9 o'clock. She also took 3 ibuprofen yesterday evening. The patient denies any color changes of the affected area or hotness to the touch. She began to exercise by walking and experienced pain the following week, while getting ready to leave on vacation. She wonders if her pain and swelling could be related to tendonitis. When she went to [place], she walked 10 miles a day with no discomfort. The swelling and pain are both resolved in the mornings. She denies any fever.    Left breast pain  The patient has been experiencing itching, pain, and occasional swelling of her left breast for approximately 2 weeks. She last had a mammogram done on 01/2021. She has noticed slight areolar pigmentation. The patient denies any chest pain or trouble breathing. Her maternal grandmother had breast cancer.    Hypertension  The patient's blood pressure is slightly elevated today. She took her blood pressure medication 30 minutes before she walked into her appointment today.    Obesity  The patient is  "unhappy with her current weight.    Health maintenance  The patient has not received her influenza vaccine this year. She is due for a bone scan and has never had one done before. Her mother has osteoporosis. The patient is not taking Mucinex regularly.    Objective   Vital Signs:   /80 (BP Location: Right arm, Patient Position: Sitting, Cuff Size: Adult)   Pulse 75   Temp 97 °F (36.1 °C)   Resp 18   Ht 167.6 cm (66\")   Wt 111 kg (244 lb)   SpO2 96%   BMI 39.38 kg/m²     Physical Exam  Vitals reviewed.   Constitutional:       Appearance: Normal appearance. She is well-developed. She is obese.   HENT:      Head: Normocephalic and atraumatic.      Right Ear: External ear normal.      Left Ear: External ear normal.   Eyes:      Conjunctiva/sclera: Conjunctivae normal.      Pupils: Pupils are equal, round, and reactive to light.   Cardiovascular:      Rate and Rhythm: Normal rate and regular rhythm.      Heart sounds: No murmur heard.    No friction rub. No gallop.   Pulmonary:      Effort: Pulmonary effort is normal.      Breath sounds: Normal breath sounds. No wheezing or rhonchi.   Musculoskeletal:      Right lower leg: Edema (1+ pitting edema) present.      Left lower leg: Edema (1+ pitting edema) present.   Skin:     General: Skin is warm and dry.   Neurological:      Mental Status: She is alert and oriented to person, place, and time.   Psychiatric:         Mood and Affect: Affect normal.         Behavior: Behavior normal.         Thought Content: Thought content normal.        Result Review :       Common labs    Common Labs 4/22/22 4/22/22 4/22/22 10/21/22 10/21/22 10/21/22 10/21/22    1049 1049 1049 1132 1132 1132 1132   Glucose  90  84      BUN  26 (A)  27 (A)      Creatinine  0.90  0.99      Sodium  139  138      Potassium  3.9  3.6      Chloride  99  97 (A)      Calcium  10.0  10.2      Albumin  4.50  4.30      Total Bilirubin  0.2  0.3      Alkaline Phosphatase  82  84      AST (SGOT)  27  21   "    ALT (SGPT)  25  20      WBC 6.49     7.68    Hemoglobin 12.6     12.7    Hematocrit 39.7     38.5    Platelets 404     434    Total Cholesterol   145  158     Triglycerides   127  157 (A)     HDL Cholesterol   52  56     LDL Cholesterol    71  75     Hemoglobin A1C       6.70 (A)   (A) Abnormal value              Results for orders placed or performed in visit on 10/21/22   Comprehensive Metabolic Panel    Specimen: Arm, Right; Blood   Result Value Ref Range    Glucose 84 65 - 99 mg/dL    BUN 27 (H) 8 - 23 mg/dL    Creatinine 0.99 0.57 - 1.00 mg/dL    Sodium 138 136 - 145 mmol/L    Potassium 3.6 3.5 - 5.2 mmol/L    Chloride 97 (L) 98 - 107 mmol/L    CO2 28.7 22.0 - 29.0 mmol/L    Calcium 10.2 8.6 - 10.5 mg/dL    Total Protein 7.4 6.0 - 8.5 g/dL    Albumin 4.30 3.50 - 5.20 g/dL    ALT (SGPT) 20 1 - 33 U/L    AST (SGOT) 21 1 - 32 U/L    Alkaline Phosphatase 84 39 - 117 U/L    Total Bilirubin 0.3 0.0 - 1.2 mg/dL    Globulin 3.1 gm/dL    A/G Ratio 1.4 g/dL    BUN/Creatinine Ratio 27.3 (H) 7.0 - 25.0    Anion Gap 12.3 5.0 - 15.0 mmol/L    eGFR 63.4 >60.0 mL/min/1.73   TSH    Specimen: Arm, Right; Blood   Result Value Ref Range    TSH 1.380 0.270 - 4.200 uIU/mL   Lipid Panel    Specimen: Arm, Right; Blood   Result Value Ref Range    Total Cholesterol 158 0 - 200 mg/dL    Triglycerides 157 (H) 0 - 150 mg/dL    HDL Cholesterol 56 40 - 60 mg/dL    LDL Cholesterol  75 0 - 100 mg/dL    VLDL Cholesterol 27 5 - 40 mg/dL    LDL/HDL Ratio 1.26    Hemoglobin A1c    Specimen: Arm, Right; Blood   Result Value Ref Range    Hemoglobin A1C 6.70 (H) 4.80 - 5.60 %   Vitamin D,25-Hydroxy    Specimen: Arm, Right; Blood   Result Value Ref Range    25 Hydroxy, Vitamin D 58.6 30.0 - 100.0 ng/ml   CBC Auto Differential    Specimen: Arm, Right; Blood   Result Value Ref Range    WBC 7.68 3.40 - 10.80 10*3/mm3    RBC 4.63 3.77 - 5.28 10*6/mm3    Hemoglobin 12.7 12.0 - 15.9 g/dL    Hematocrit 38.5 34.0 - 46.6 %    MCV 83.2 79.0 - 97.0 fL    MCH  27.4 26.6 - 33.0 pg    MCHC 33.0 31.5 - 35.7 g/dL    RDW 13.5 12.3 - 15.4 %    RDW-SD 41.1 37.0 - 54.0 fl    MPV 10.2 6.0 - 12.0 fL    Platelets 434 140 - 450 10*3/mm3    Neutrophil % 60.4 42.7 - 76.0 %    Lymphocyte % 30.2 19.6 - 45.3 %    Monocyte % 5.5 5.0 - 12.0 %    Eosinophil % 3.1 0.3 - 6.2 %    Basophil % 0.5 0.0 - 1.5 %    Immature Grans % 0.3 0.0 - 0.5 %    Neutrophils, Absolute 4.64 1.70 - 7.00 10*3/mm3    Lymphocytes, Absolute 2.32 0.70 - 3.10 10*3/mm3    Monocytes, Absolute 0.42 0.10 - 0.90 10*3/mm3    Eosinophils, Absolute 0.24 0.00 - 0.40 10*3/mm3    Basophils, Absolute 0.04 0.00 - 0.20 10*3/mm3    Immature Grans, Absolute 0.02 0.00 - 0.05 10*3/mm3    nRBC 0.0 0.0 - 0.2 /100 WBC            Procedures        Assessment and Plan    Diagnoses and all orders for this visit:    1. Left leg pain (Primary)  Comments:  likely venous stasis, cont to monitor closely    2. Essential hypertension  Comments:  Stable and well controlled. Will obtain labs today. Continue on medications as prescribed. Will adjust meds based on lab results.   Orders:  -     olmesartan-hydrochlorothiazide (BENICAR HCT) 20-12.5 MG per tablet; Take 1 tablet by mouth Daily.  Dispense: 90 tablet; Refill: 1  -     Comprehensive Metabolic Panel  -     CBC & Differential  -     TSH  -     Lipid Panel  -     Hemoglobin A1c  -     Vitamin D,25-Hydroxy    3. Breast lesion  -     Mammo Screening Digital Tomosynthesis Bilateral With CAD; Future    4. Obesity (BMI 30-39.9)  -     Comprehensive Metabolic Panel  -     CBC & Differential  -     TSH  -     Lipid Panel  -     Hemoglobin A1c  -     Vitamin D,25-Hydroxy    5. Postmenopause  -     DEXA Bone Density Axial; Future    Other orders  -     fenofibrate (TRICOR) 145 MG tablet; Take 1 tablet by mouth Daily.  Dispense: 90 tablet; Refill: 1  -     furosemide (LASIX) 20 MG tablet; Take 1 tablet by mouth Daily for 90 days. PT STATED USUALLY ONLY TAKE 1 TIME A DAY  Dispense: 90 tablet; Refill: 1  -      pantoprazole (PROTONIX) 40 MG EC tablet; Take 1 tablet by mouth Daily.  Dispense: 90 tablet; Refill: 1  -     potassium chloride (MICRO-K) 10 MEQ CR capsule; Take 1 capsule by mouth 2 (Two) Times a Day.  Dispense: 180 capsule; Refill: 1  -     ibuprofen (ADVIL,MOTRIN) 600 MG tablet; Take 1 tablet by mouth Every 6 (Six) Hours As Needed for Mild Pain.  Dispense: 90 tablet; Refill: 1  -     Fluzone High-Dose 65+yrs      1. Edema of right lower extremity  - A STAT ultrasound of the right lower extremity will be scheduled if any worsening of symptoms occur.  - Ibuprofen 600 mg will be prescribed. The patient is to take the medication 3 times a day with food and water for 5 days.  - The patient is to apply ice for 20 minutes twice a day for 5 days.  - Continue wearing compression stockings.  - Stretch regularly and continue walking as often as possible.    2. Left breast mastalgia  - A diagnostic mammogram will be ordered.    3. Hypertension  - Continue taking current medications.    4. Obesity  - The patient is encouraged to diet and continue exercising.  - Ozempic has been discussed.    4. Health maintenance  - Routine medications have been refilled.  - Baseline labs will be done today.  - Influenza vaccine will be administered today.  - A bone scan will be ordered.  {Class 2 Severe Obesity (BMI >=35 and <=39.9). Obesity-related health conditions include the following: hypertension. Obesity is unchanged. BMI is diet and exercise. We discussed portion control and increasing exercise.            Follow Up   No follow-ups on file.  Patient was given instructions and counseling regarding her condition or for health maintenance advice. Please see specific information pulled into the AVS if appropriate.     .Transcribed from ambient dictation for Lisa Jones MD by Irma Saucedo.  10/21/22   14:04 EDT    Patient or patient representative verbalized consent to the visit recording.  I have personally performed the  services described in this document as transcribed by the above individual, and it is both accurate and complete.

## 2022-10-22 LAB
25(OH)D3 SERPL-MCNC: 58.6 NG/ML (ref 30–100)
HBA1C MFR BLD: 6.7 % (ref 4.8–5.6)

## 2022-10-22 RX ORDER — TIRZEPATIDE 2.5 MG/.5ML
2.5 INJECTION, SOLUTION SUBCUTANEOUS WEEKLY
Qty: 2.5 ML | Refills: 1 | Status: SHIPPED | OUTPATIENT
Start: 2022-10-22 | End: 2022-11-07

## 2022-10-26 ENCOUNTER — TELEPHONE (OUTPATIENT)
Dept: GASTROENTEROLOGY | Facility: CLINIC | Age: 67
End: 2022-10-26

## 2022-10-26 NOTE — TELEPHONE ENCOUNTER
Left message for pt on colon/egd on 11.08.2022. informed she could call the office with any questions will also send a my chart message

## 2022-10-27 ENCOUNTER — TELEPHONE (OUTPATIENT)
Dept: INTERNAL MEDICINE | Facility: CLINIC | Age: 67
End: 2022-10-27

## 2022-10-27 NOTE — TELEPHONE ENCOUNTER
Patient called office wanting to let pcp know that her foot and leg are hurting worse, wants a call back please.

## 2022-10-28 ENCOUNTER — CLINICAL SUPPORT (OUTPATIENT)
Dept: INTERNAL MEDICINE | Facility: CLINIC | Age: 67
End: 2022-10-28

## 2022-10-28 DIAGNOSIS — N64.4 BREAST PAIN: Primary | ICD-10-CM

## 2022-10-28 DIAGNOSIS — J30.2 SEASONAL ALLERGIC RHINITIS, UNSPECIFIED TRIGGER: Primary | ICD-10-CM

## 2022-10-28 PROCEDURE — 95117 IMMUNOTHERAPY INJECTIONS: CPT | Performed by: INTERNAL MEDICINE

## 2022-11-01 ENCOUNTER — TELEPHONE (OUTPATIENT)
Dept: INTERNAL MEDICINE | Facility: CLINIC | Age: 67
End: 2022-11-01

## 2022-11-01 DIAGNOSIS — M79.605 PAIN OF LEFT LOWER EXTREMITY: Primary | ICD-10-CM

## 2022-11-01 NOTE — TELEPHONE ENCOUNTER
Caller: India Gaytan    Relationship: Self    Best call back number: [phone]    What is the best time to reach you: ANY     Who are you requesting to speak with (clinical staff, provider,  specific staff member): [facility]    Do you know the name of the person who called: [facility]     What was the call regarding: PATIENTS LEG     Do you require a callback: YES       PATIENT MISSED A CALL FROM [facility] AND TRIED TO CALLBACK BUT WAS UNABLE TO GET THROUGH TO OFFICE DUE TO IT BEING ALMOST 5. PLEASE CALL AND ADVISE WHEN POSSIBLE.

## 2022-11-03 NOTE — TELEPHONE ENCOUNTER
Pts leg is not getting any better and she is hoping for some diagnostic work or advise of what she can do. Please call her back when you can.

## 2022-11-04 ENCOUNTER — APPOINTMENT (OUTPATIENT)
Dept: CARDIOLOGY | Facility: HOSPITAL | Age: 67
End: 2022-11-04

## 2022-11-04 ENCOUNTER — HOSPITAL ENCOUNTER (OUTPATIENT)
Dept: CARDIOLOGY | Facility: HOSPITAL | Age: 67
Discharge: HOME | End: 2022-11-04
Admitting: INTERNAL MEDICINE

## 2022-11-04 DIAGNOSIS — M79.605 PAIN OF LEFT LOWER EXTREMITY: ICD-10-CM

## 2022-11-04 LAB
BH CV LOWER VASCULAR LEFT COMMON FEMORAL AUGMENT: NORMAL
BH CV LOWER VASCULAR LEFT COMMON FEMORAL COMPETENT: NORMAL
BH CV LOWER VASCULAR LEFT COMMON FEMORAL COMPRESS: NORMAL
BH CV LOWER VASCULAR LEFT COMMON FEMORAL PHASIC: NORMAL
BH CV LOWER VASCULAR LEFT COMMON FEMORAL SPONT: NORMAL
BH CV LOWER VASCULAR RIGHT COMMON FEMORAL AUGMENT: NORMAL
BH CV LOWER VASCULAR RIGHT COMMON FEMORAL COMPETENT: NORMAL
BH CV LOWER VASCULAR RIGHT COMMON FEMORAL COMPRESS: NORMAL
BH CV LOWER VASCULAR RIGHT COMMON FEMORAL PHASIC: NORMAL
BH CV LOWER VASCULAR RIGHT COMMON FEMORAL SPONT: NORMAL
BH CV LOWER VASCULAR RIGHT DISTAL FEMORAL COMPRESS: NORMAL
BH CV LOWER VASCULAR RIGHT GASTRONEMIUS COMPRESS: NORMAL
BH CV LOWER VASCULAR RIGHT GREATER SAPH AK COMPRESS: NORMAL
BH CV LOWER VASCULAR RIGHT GREATER SAPH BK COMPRESS: NORMAL
BH CV LOWER VASCULAR RIGHT LESSER SAPH COMPRESS: NORMAL
BH CV LOWER VASCULAR RIGHT MID FEMORAL AUGMENT: NORMAL
BH CV LOWER VASCULAR RIGHT MID FEMORAL COMPETENT: NORMAL
BH CV LOWER VASCULAR RIGHT MID FEMORAL COMPRESS: NORMAL
BH CV LOWER VASCULAR RIGHT MID FEMORAL PHASIC: NORMAL
BH CV LOWER VASCULAR RIGHT MID FEMORAL SPONT: NORMAL
BH CV LOWER VASCULAR RIGHT PERONEAL COMPRESS: NORMAL
BH CV LOWER VASCULAR RIGHT POPLITEAL AUGMENT: NORMAL
BH CV LOWER VASCULAR RIGHT POPLITEAL COMPETENT: NORMAL
BH CV LOWER VASCULAR RIGHT POPLITEAL COMPRESS: NORMAL
BH CV LOWER VASCULAR RIGHT POPLITEAL PHASIC: NORMAL
BH CV LOWER VASCULAR RIGHT POPLITEAL SPONT: NORMAL
BH CV LOWER VASCULAR RIGHT POSTERIOR TIBIAL AUGMENT: NORMAL
BH CV LOWER VASCULAR RIGHT POSTERIOR TIBIAL COMPRESS: NORMAL
BH CV LOWER VASCULAR RIGHT PROXIMAL FEMORAL COMPRESS: NORMAL
MAXIMAL PREDICTED HEART RATE: 155 BPM
STRESS TARGET HR: 132 BPM

## 2022-11-04 PROCEDURE — 93971 EXTREMITY STUDY: CPT | Performed by: SURGERY

## 2022-11-04 PROCEDURE — 93971 EXTREMITY STUDY: CPT

## 2022-11-04 NOTE — TELEPHONE ENCOUNTER
I am ordering a stat ultrasound of her left leg.  It needs to be done today.. if she isn't hearing about this let us know.  If anything worsens or she has trouble breathing she should go to the ER.

## 2022-11-07 RX ORDER — LANOLIN ALCOHOL/MO/W.PET/CERES
1000 CREAM (GRAM) TOPICAL DAILY
COMMUNITY

## 2022-11-08 ENCOUNTER — ANESTHESIA (OUTPATIENT)
Dept: GASTROENTEROLOGY | Facility: HOSPITAL | Age: 67
End: 2022-11-08

## 2022-11-08 ENCOUNTER — HOSPITAL ENCOUNTER (OUTPATIENT)
Facility: HOSPITAL | Age: 67
Setting detail: HOSPITAL OUTPATIENT SURGERY
Discharge: HOME | End: 2022-11-08
Attending: INTERNAL MEDICINE | Admitting: INTERNAL MEDICINE

## 2022-11-08 ENCOUNTER — ANESTHESIA EVENT (OUTPATIENT)
Dept: GASTROENTEROLOGY | Facility: HOSPITAL | Age: 67
End: 2022-11-08

## 2022-11-08 VITALS
DIASTOLIC BLOOD PRESSURE: 79 MMHG | OXYGEN SATURATION: 97 % | RESPIRATION RATE: 19 BRPM | BODY MASS INDEX: 39.14 KG/M2 | HEART RATE: 61 BPM | TEMPERATURE: 97.7 F | SYSTOLIC BLOOD PRESSURE: 160 MMHG | WEIGHT: 242.51 LBS

## 2022-11-08 DIAGNOSIS — K57.90 DIVERTICULOSIS: ICD-10-CM

## 2022-11-08 DIAGNOSIS — Z12.11 ENCOUNTER FOR SCREENING FOR MALIGNANT NEOPLASM OF COLON: ICD-10-CM

## 2022-11-08 DIAGNOSIS — Z86.010 PERSONAL HISTORY OF COLONIC POLYPS: ICD-10-CM

## 2022-11-08 PROCEDURE — 25010000002 PROPOFOL 10 MG/ML EMULSION: Performed by: NURSE ANESTHETIST, CERTIFIED REGISTERED

## 2022-11-08 PROCEDURE — 43239 EGD BIOPSY SINGLE/MULTIPLE: CPT | Performed by: INTERNAL MEDICINE

## 2022-11-08 PROCEDURE — 45378 DIAGNOSTIC COLONOSCOPY: CPT | Performed by: INTERNAL MEDICINE

## 2022-11-08 PROCEDURE — 88305 TISSUE EXAM BY PATHOLOGIST: CPT | Performed by: INTERNAL MEDICINE

## 2022-11-08 RX ORDER — SODIUM CHLORIDE, SODIUM LACTATE, POTASSIUM CHLORIDE, CALCIUM CHLORIDE 600; 310; 30; 20 MG/100ML; MG/100ML; MG/100ML; MG/100ML
30 INJECTION, SOLUTION INTRAVENOUS CONTINUOUS
Status: DISCONTINUED | OUTPATIENT
Start: 2022-11-08 | End: 2022-11-08 | Stop reason: HOSPADM

## 2022-11-08 RX ORDER — LIDOCAINE HYDROCHLORIDE 20 MG/ML
INJECTION, SOLUTION EPIDURAL; INFILTRATION; INTRACAUDAL; PERINEURAL AS NEEDED
Status: DISCONTINUED | OUTPATIENT
Start: 2022-11-08 | End: 2022-11-08 | Stop reason: SURG

## 2022-11-08 RX ADMIN — LIDOCAINE HYDROCHLORIDE 40 MG: 20 INJECTION, SOLUTION EPIDURAL; INFILTRATION; INTRACAUDAL; PERINEURAL at 10:44

## 2022-11-08 RX ADMIN — SODIUM CHLORIDE, POTASSIUM CHLORIDE, SODIUM LACTATE AND CALCIUM CHLORIDE 30 ML/HR: 600; 310; 30; 20 INJECTION, SOLUTION INTRAVENOUS at 10:26

## 2022-11-08 RX ADMIN — PROPOFOL 250 MCG/KG/MIN: 10 INJECTION, EMULSION INTRAVENOUS at 10:44

## 2022-11-08 NOTE — ANESTHESIA PREPROCEDURE EVALUATION
Anesthesia Evaluation     Patient summary reviewed and Nursing notes reviewed                Airway   Mallampati: I  TM distance: >3 FB  Neck ROM: full  No difficulty expected  Dental      Pulmonary - negative pulmonary ROS and normal exam    breath sounds clear to auscultation  Cardiovascular - normal exam    Rhythm: regular  Rate: normal    (+) hypertension, valvular problems/murmurs, hyperlipidemia,       Neuro/Psych  (+) numbness,    GI/Hepatic/Renal/Endo    (+) morbid obesity,      Musculoskeletal     Abdominal    Substance History - negative use     OB/GYN negative ob/gyn ROS         Other   arthritis,                      Anesthesia Plan    ASA 3     general     intravenous induction     Anesthetic plan, risks, benefits, and alternatives have been provided, discussed and informed consent has been obtained with: patient.        CODE STATUS:

## 2022-11-08 NOTE — H&P
ScreeningPre Procedure History & Physical    Chief Complaint:   Screening     Subjective     HPI:   Screening     Past Medical History:   Past Medical History:   Diagnosis Date   • Anemia     ASYMPTOMATIC   • Anesthesia     SLOW TO WAKE UP POSTOP    • Benign essential hypertension    • Chronic allergic rhinitis    • Essential hypertension    • Gout 05/30/2019    greatly improved   • High cholesterol    • Hyperlipidemia    • Lower extremity edema 09/18/2017    cont compression stockings can do lymphedema therapy if she wishes in the future   • Lumbar back pain with radiculopathy affecting right lower extremity 06/12/2015   • Mixed hyperlipidemia    • Osteoarthritis of right hip 06/12/2015   • Pain of right hip joint 09/18/2017    has chronic arthritis of that hip with tendonitits seen previously in MRI however she doesn't wish to do pt at this time, will try stretches that she has done in the past at home currently   • PONV (postoperative nausea and vomiting)    • Seasonal allergies    • Ventral hernia CURRENTLY       Past Surgical History:  Past Surgical History:   Procedure Laterality Date   • ABDOMINAL HYSTERECTOMY     • BACK SURGERY     • COLON RESECTION  2020    DR CANNON   • COLON SURGERY      RELATED TO ABSCESS   • COLONOSCOPY  2006, 2019, 2020   • EXCISION LESION N/A 06/13/2022    Procedure: EXCISION BACK MASS;  Surgeon: Eliseo Cannon MD;  Location: [facility] OR;  Service: General;  Laterality: N/A;   • HERNIA REPAIR  12/2020    VENTRAL HERNIA    • HYSTERECTOMY  1996   • OOPHORECTOMY  1999   • ROTATOR CUFF REPAIR Left 2011   • VENTRAL HERNIA REPAIR N/A 08/02/2021    Procedure: VENTRAL HERNIA REPAIR LAPAROSCOPIC WITH DAVINCI ROBOT;  Surgeon: Eliseo Cannon MD;  Location: [facility] OR;  Service: Robotics - DaVinci;  Laterality: N/A;   • VERTEBROPLASTY  08/07/2020    T11       Family History:  Family History   Problem Relation Age of Onset   • Heart disease Mother    • Heart disease Father    •  Colon cancer Neg Hx    • Malig Hyperthermia Neg Hx        Social History:   reports that she has never smoked. She has been exposed to tobacco smoke. She has never used smokeless tobacco. She reports current alcohol use. She reports that she does not use drugs.    Medications:   Medications Prior to Admission   Medication Sig Dispense Refill Last Dose   • Allergy Relief 5 MG tablet Take 5 mg by mouth Daily.   11/7/2022   • azelastine (ASTELIN) 0.1 % nasal spray SPRAY ONE TO TWO SPRAYS IN each nostril TWICE DAILY   Past Week   • EPINEPHrine (EPIPEN) 0.3 MG/0.3ML solution auto-injector injection Use as directed for acute allergic reaction.      • fenofibrate (TRICOR) 145 MG tablet Take 1 tablet by mouth Daily. 90 tablet 1 11/7/2022   • fluticasone (FLONASE) 50 MCG/ACT nasal spray SPRAY TWO SPRAY IN each nostril EVERY DAY   Past Week   • furosemide (LASIX) 20 MG tablet Take 1 tablet by mouth Daily for 90 days. PT STATED USUALLY ONLY TAKE 1 TIME A DAY 90 tablet 1 11/7/2022   • ibuprofen (ADVIL,MOTRIN) 600 MG tablet Take 1 tablet by mouth Every 6 (Six) Hours As Needed for Mild Pain. 90 tablet 1 Past Week   • montelukast (SINGULAIR) 10 MG tablet take 1 tablet (10 mg) by oral route once daily in the evening   11/7/2022   • olmesartan-hydrochlorothiazide (BENICAR HCT) 20-12.5 MG per tablet Take 1 tablet by mouth Daily. 90 tablet 1 11/7/2022   • pantoprazole (PROTONIX) 40 MG EC tablet Take 1 tablet by mouth Daily. 90 tablet 1 11/7/2022   • Pataday 0.7 % solution Administer 1 drop to both eyes Daily As Needed. NOT USED IN A WHILE   Past Week   • potassium chloride (MICRO-K) 10 MEQ CR capsule Take 1 capsule by mouth 2 (Two) Times a Day. (Patient taking differently: Take 1 capsule by mouth 2 (Two) Times a Day As Needed.) 180 capsule 1 11/7/2022   • vitamin B-12 (CYANOCOBALAMIN) 1000 MCG tablet Take 1 tablet by mouth Daily.   11/7/2022   • VITAMIN D, ERGOCALCIFEROL, PO Take  by mouth Daily.   11/7/2022   • albuterol sulfate HFA  108 (90 Base) MCG/ACT inhaler Inhale 2 puffs Every 4 (Four) Hours As Needed for Wheezing or Shortness of Air (USES FOR ALLEGERIES).   Unknown       Allergies:  Levofloxacin        Objective     Blood pressure 165/69, pulse 62, temperature 97.1 °F (36.2 °C), temperature source Temporal, resp. rate 18, weight 110 kg (242 lb 8.1 oz), SpO2 98 %, not currently breastfeeding.    Physical Exam   Constitutional: Pt is oriented to person, place, and time and well-developed, well-nourished, and in no distress.   Mouth/Throat: Oropharynx is clear and moist.   Neck: Normal range of motion.   Cardiovascular: Normal rate, regular rhythm and normal heart sounds.    Pulmonary/Chest: Effort normal and breath sounds normal.   Abdominal: Soft. Nontender  Skin: Skin is warm and dry.   Psychiatric: Mood, memory, affect and judgment normal.     Assessment & Plan     Diagnosis:  Screening colonoscopy  H/o colon polyps  gerd     Anticipated Surgical Procedure:  Colonoscopy  egd    The risks, benefits, and alternatives of this procedure have been discussed with the patient or the responsible party- the patient understands and agrees to proceed.

## 2022-11-08 NOTE — ANESTHESIA POSTPROCEDURE EVALUATION
Patient: India Gaytan    Procedure Summary     Date: 11/08/22 Room / Location: [facility] ENDOSCOPY 2 / [facility] ENDOSCOPY    Anesthesia Start: 1043 Anesthesia Stop: 1116    Procedures:       ESOPHAGOGASTRODUODENOSCOPY WITH BX      COLONOSCOPY Diagnosis:       Diverticulosis      Personal history of colonic polyps      Encounter for screening for malignant neoplasm of colon      (Diverticulosis [K57.90])      (Personal history of colonic polyps [Z86.010])      (Encounter for screening for malignant neoplasm of colon [Z12.11])    Surgeons: Aly Rangel MD Provider: Marito Roy MD    Anesthesia Type: general ASA Status: 3          Anesthesia Type: general    Vitals  Vitals Value Taken Time   /79 11/08/22 1137   Temp 36.1 °C (96.9 °F) 11/08/22 1117   Pulse 59 11/08/22 1137   Resp 19 11/08/22 1125   SpO2 99 % 11/08/22 1137   Vitals shown include unvalidated device data.        Post Anesthesia Care and Evaluation    Patient location during evaluation: bedside  Patient participation: complete - patient participated  Level of consciousness: awake  Pain management: adequate    Airway patency: patent  Anesthetic complications: No anesthetic complications  PONV Status: none  Cardiovascular status: acceptable and stable  Respiratory status: acceptable  Hydration status: acceptable    Comments: An Anesthesiologist personally participated in the most demanding procedures (including induction and emergence if applicable) in the anesthesia plan, monitored the course of anesthesia administration at frequent intervals and remained physically present and available for immediate diagnosis and treatment of emergencies.

## 2022-11-10 ENCOUNTER — TELEPHONE (OUTPATIENT)
Dept: GASTROENTEROLOGY | Facility: CLINIC | Age: 67
End: 2022-11-10

## 2022-11-10 NOTE — TELEPHONE ENCOUNTER
I spoke with Ms Lukas, notified of BX results. 5yr colon recall and care gap placed. Voiced understanding. candida

## 2022-11-10 NOTE — TELEPHONE ENCOUNTER
----- Message from MARISELA Martínez sent at 11/10/2022  8:25 AM EST -----  I have reviewed the patients upper endoscopy and pathology. Biopsies are negative for H. Pylori, dysplasia, metaplasia, and malignancy.    I have reviewed the patients colonoscopy report. The report showed a normal colon mucosa with evidence of prior colon surgery and diverticulosis.  No polyps removed or specimens collected.  Repeat colonoscopy in 5 years due to personal history of colon polyps. Please place in recall.

## 2022-11-16 ENCOUNTER — CLINICAL SUPPORT (OUTPATIENT)
Dept: INTERNAL MEDICINE | Facility: CLINIC | Age: 67
End: 2022-11-16

## 2022-11-16 DIAGNOSIS — J30.2 SEASONAL ALLERGIC RHINITIS, UNSPECIFIED TRIGGER: Primary | ICD-10-CM

## 2022-11-16 PROCEDURE — 95117 IMMUNOTHERAPY INJECTIONS: CPT | Performed by: INTERNAL MEDICINE

## 2022-11-23 ENCOUNTER — HOSPITAL ENCOUNTER (OUTPATIENT)
Dept: MAMMOGRAPHY | Facility: HOSPITAL | Age: 67
Discharge: HOME | End: 2022-11-23

## 2022-11-23 ENCOUNTER — HOSPITAL ENCOUNTER (OUTPATIENT)
Dept: ULTRASOUND IMAGING | Facility: HOSPITAL | Age: 67
Discharge: HOME | End: 2022-11-23

## 2022-11-23 DIAGNOSIS — N64.4 BREAST PAIN: ICD-10-CM

## 2022-11-23 PROCEDURE — 77066 DX MAMMO INCL CAD BI: CPT

## 2022-11-23 PROCEDURE — G0279 TOMOSYNTHESIS, MAMMO: HCPCS

## 2022-12-02 ENCOUNTER — CLINICAL SUPPORT (OUTPATIENT)
Dept: INTERNAL MEDICINE | Facility: CLINIC | Age: 67
End: 2022-12-02

## 2022-12-02 DIAGNOSIS — J30.9 ALLERGIC RHINITIS, UNSPECIFIED SEASONALITY, UNSPECIFIED TRIGGER: Primary | ICD-10-CM

## 2022-12-02 PROCEDURE — 95117 IMMUNOTHERAPY INJECTIONS: CPT | Performed by: INTERNAL MEDICINE

## 2022-12-16 ENCOUNTER — OFFICE VISIT (OUTPATIENT)
Dept: INTERNAL MEDICINE | Facility: CLINIC | Age: 67
End: 2022-12-16

## 2022-12-16 VITALS
HEART RATE: 89 BPM | DIASTOLIC BLOOD PRESSURE: 74 MMHG | BODY MASS INDEX: 40.22 KG/M2 | OXYGEN SATURATION: 97 % | SYSTOLIC BLOOD PRESSURE: 132 MMHG | WEIGHT: 249.2 LBS | TEMPERATURE: 97.8 F

## 2022-12-16 DIAGNOSIS — R60.0 LOWER EXTREMITY EDEMA: ICD-10-CM

## 2022-12-16 DIAGNOSIS — G89.29 CHRONIC PAIN OF RIGHT ANKLE: ICD-10-CM

## 2022-12-16 DIAGNOSIS — Z23 NEED FOR VACCINATION: ICD-10-CM

## 2022-12-16 DIAGNOSIS — K59.00 CONSTIPATION, UNSPECIFIED CONSTIPATION TYPE: ICD-10-CM

## 2022-12-16 DIAGNOSIS — E11.65 TYPE 2 DIABETES MELLITUS WITH HYPERGLYCEMIA, WITHOUT LONG-TERM CURRENT USE OF INSULIN: Primary | ICD-10-CM

## 2022-12-16 DIAGNOSIS — E78.2 MIXED HYPERLIPIDEMIA: ICD-10-CM

## 2022-12-16 DIAGNOSIS — J30.9 ALLERGIC RHINITIS, UNSPECIFIED SEASONALITY, UNSPECIFIED TRIGGER: ICD-10-CM

## 2022-12-16 DIAGNOSIS — M25.571 CHRONIC PAIN OF RIGHT ANKLE: ICD-10-CM

## 2022-12-16 DIAGNOSIS — I10 ESSENTIAL HYPERTENSION: ICD-10-CM

## 2022-12-16 PROCEDURE — 99214 OFFICE O/P EST MOD 30 MIN: CPT | Performed by: INTERNAL MEDICINE

## 2022-12-16 PROCEDURE — 95117 IMMUNOTHERAPY INJECTIONS: CPT | Performed by: INTERNAL MEDICINE

## 2022-12-16 PROCEDURE — 1125F AMNT PAIN NOTED PAIN PRSNT: CPT | Performed by: INTERNAL MEDICINE

## 2022-12-16 PROCEDURE — 1170F FXNL STATUS ASSESSED: CPT | Performed by: INTERNAL MEDICINE

## 2022-12-16 PROCEDURE — 1159F MED LIST DOCD IN RCRD: CPT | Performed by: INTERNAL MEDICINE

## 2022-12-16 PROCEDURE — G0402 INITIAL PREVENTIVE EXAM: HCPCS | Performed by: INTERNAL MEDICINE

## 2022-12-16 RX ORDER — TIRZEPATIDE 2.5 MG/.5ML
2.5 INJECTION, SOLUTION SUBCUTANEOUS WEEKLY
Qty: 2.5 ML | Refills: 1 | Status: SHIPPED | OUTPATIENT
Start: 2022-12-16 | End: 2023-02-14

## 2022-12-16 NOTE — PROGRESS NOTES
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   Constitutional:       Appearance: Normal appearance. She is well-developed. She is obese.   HENT:      Head: Normocephalic and atraumatic.      Right Ear: External ear normal.      Left Ear: External ear normal.   Eyes:      Conjunctiva/sclera: Conjunctivae normal.      Pupils: Pupils are equal, round, and reactive to light.   Cardiovascular:      Rate and Rhythm: Normal rate and regular rhythm.      Heart sounds: No murmur heard.    No friction rub. No gallop.      Comments: 2+ pitting edema bilaterally  Pulmonary:      Effort: Pulmonary effort is normal.      Breath sounds: Normal breath sounds. No wheezing or rhonchi.   Skin:     General: Skin is warm and dry.   Neurological:      Mental Status: She is alert and oriented to person, place, and time.   Psychiatric:         Mood and Affect: Affect normal.         Behavior: Behavior normal.         Thought Content: Thought content normal.        Result Review :       Common labs    Common Labs 4/22/22 4/22/22 4/22/22 10/21/22 10/21/22 10/21/22 10/21/22    1049 1049 1049 1132 1132 1132 1132   Glucose  90  84      BUN   "26 (A)  27 (A)      Creatinine  0.90  0.99      Sodium  139  138      Potassium  3.9  3.6      Chloride  99  97 (A)      Calcium  10.0  10.2      Albumin  4.50  4.30      Total Bilirubin  0.2  0.3      Alkaline Phosphatase  82  84      AST (SGOT)  27  21      ALT (SGPT)  25  20      WBC 6.49     7.68    Hemoglobin 12.6     12.7    Hematocrit 39.7     38.5    Platelets 404     434    Total Cholesterol   145  158     Triglycerides   127  157 (A)     HDL Cholesterol   52  56     LDL Cholesterol    71  75     Hemoglobin A1C       6.70 (A)   (A) Abnormal value              Results for orders placed or performed during the hospital encounter of 11/08/22   Tissue Pathology Exam    Specimen: Gastric, Antrum; Tissue   Result Value Ref Range    Case Report       Surgical Pathology Report                         Case: [id number]                                  Authorizing Provider:  Aly Rangel MD  Collected:           11/08/2022 11:12 AM          Ordering Location:     [facility] Received:            11/09/2022 09:57 AM                                 SUITES                                                                       Pathologist:           Irma Benton MD                                                     Specimen:    Gastric, Antrum, GASTRIC ANTRUM BX                                                         Clinical Information       Diverticulosis  Personal history of colonic polyps  Encounter for screening for malignant neoplasm of colon      Final Diagnosis       Stomach, antrum, biopsy:    - Gastric antrum mucosa with mild chronic inactive gastritis    - Negative for Helicobacter pylori on routine H&E stain    - Negative for intestinal metaplasia, dysplasia and malignancy        Gross Description       1. Gastric, Antrum.  Received in formalin and labeled \" gastric antrum\" is a 0.6 cm fragment of tan soft tissue. The specimen is entirely submitted in one cassette. DELON        " Microscopic Description              Procedures        Assessment and Plan    Diagnoses and all orders for this visit:    1. Type 2 diabetes mellitus with hyperglycemia, without long-term current use of insulin (HCC) (Primary)  Comments:  will try mounjaro and metformin  Orders:  -     CBC & Differential  -     Comprehensive Metabolic Panel  -     Lipid Panel  -     TSH  -     MicroAlbumin, Urine, Random - Urine, Clean Catch  -     Hemoglobin A1c  -     Uric Acid    2. Allergic rhinitis, unspecified seasonality, unspecified trigger  Comments:  allergy shot  Orders:  -     patient supplied allergy injection  -     patient supplied allergy injection    3. Lower extremity edema  Comments:  increase lasix as needed  Orders:  -     Uric Acid    4. Chronic pain of right ankle  Comments:  likely fluid related  Orders:  -     Uric Acid    5. Essential hypertension  Comments:  well controlled, cont current meds    6. Mixed hyperlipidemia  Comments:  will check labs and adjust as needed    7. Constipation, unspecified constipation type  Comments:  cont fiber and miralax prn    Other orders  -     Tirzepatide (Mounjaro) 2.5 MG/0.5ML solution pen-injector; Inject 2.5 mg under the skin into the appropriate area as directed 1 (One) Time Per Week.  Dispense: 2.5 mL; Refill: 1  -     metFORMIN (Glucophage) 500 MG tablet; Take 1 tablet by mouth 2 (Two) Times a Day With Meals.  Dispense: 90 tablet; Refill: 2                  Follow Up   No follow-ups on file.  Patient was given instructions and counseling regarding her condition or for health maintenance advice. Please see specific information pulled into the AVS if appropriate.

## 2022-12-16 NOTE — PROGRESS NOTES
The ABCs of the Annual Wellness Visit  Subsequent Medicare Wellness Visit    Subjective      India Gaytan is a 67 y.o. female who presents for a Subsequent Medicare Wellness Visit.    The following portions of the patient's history were reviewed and   updated as appropriate: allergies, current medications, past family history, past medical history, past social history, past surgical history and problem list.    Compared to one year ago, the patient feels her physical   health is the same.    Compared to one year ago, the patient feels her mental   health is the same.    Recent Hospitalizations:  She was not admitted to the hospital during the last year.       Current Medical Providers:  Patient Care Team:  Lisa Jones MD as PCP - General (Internal Medicine)  Aly Rangel MD as Consulting Physician (Gastroenterology)  Georgia Herrera APRN as Nurse Practitioner (Nurse Practitioner)  Lavonne Mcdowell APRN as Nurse Practitioner (Gastroenterology)    Outpatient Medications Prior to Visit   Medication Sig Dispense Refill   • albuterol sulfate  (90 Base) MCG/ACT inhaler Inhale 2 puffs Every 4 (Four) Hours As Needed for Wheezing or Shortness of Air (USES FOR ALLEGERIES).     • Allergy Relief 5 MG tablet Take 5 mg by mouth Daily.     • azelastine (ASTELIN) 0.1 % nasal spray SPRAY ONE TO TWO SPRAYS IN each nostril TWICE DAILY     • EPINEPHrine (EPIPEN) 0.3 MG/0.3ML solution auto-injector injection Use as directed for acute allergic reaction.     • fenofibrate (TRICOR) 145 MG tablet Take 1 tablet by mouth Daily. 90 tablet 1   • fluticasone (FLONASE) 50 MCG/ACT nasal spray SPRAY TWO SPRAY IN each nostril EVERY DAY     • furosemide (LASIX) 20 MG tablet Take 1 tablet by mouth Daily for 90 days. PT STATED USUALLY ONLY TAKE 1 TIME A DAY 90 tablet 1   • ibuprofen (ADVIL,MOTRIN) 600 MG tablet Take 1 tablet by mouth Every 6 (Six) Hours As Needed for Mild Pain. 90 tablet 1   • montelukast (SINGULAIR) 10  MG tablet take 1 tablet (10 mg) by oral route once daily in the evening     • olmesartan-hydrochlorothiazide (BENICAR HCT) 20-12.5 MG per tablet Take 1 tablet by mouth Daily. 90 tablet 1   • pantoprazole (PROTONIX) 40 MG EC tablet Take 1 tablet by mouth Daily. 90 tablet 1   • Pataday 0.7 % solution Administer 1 drop to both eyes Daily As Needed. NOT USED IN A WHILE     • potassium chloride (MICRO-K) 10 MEQ CR capsule Take 1 capsule by mouth 2 (Two) Times a Day. (Patient taking differently: Take 10 mEq by mouth 2 (Two) Times a Day As Needed.) 180 capsule 1   • vitamin B-12 (CYANOCOBALAMIN) 1000 MCG tablet Take 1 tablet by mouth Daily.     • VITAMIN D, ERGOCALCIFEROL, PO Take  by mouth Daily.       No facility-administered medications prior to visit.       No opioid medication identified on active medication list. I have reviewed chart for other potential  high risk medication/s and harmful drug interactions in the elderly.          Aspirin is not on active medication list.  Aspirin use is not indicated based on review of current medical condition/s. Risk of harm outweighs potential benefits.  .    Patient Active Problem List   Diagnosis   • Anemia   • Essential hypertension   • Gout   • Lower extremity edema   • Mixed hyperlipidemia   • Neuralgia, neuritis or radiculitis   • Osteoarthritis of hip   • Seasonal allergic rhinitis   • Incisional hernia, without obstruction or gangrene   • Ventral incisional hernia   • Status post hernia repair   • Heart murmur   • Mass on back   • Diverticulosis   • Personal history of colonic polyps   • Encounter for screening for malignant neoplasm of colon     Advance Care Planning  Advance Directive is not on file.  ACP discussion was held with the patient during this visit. Patient does not have an advance directive, information provided.     Objective    Vitals:    12/16/22 0958   BP: 132/74   BP Location: Right arm   Patient Position: Sitting   Cuff Size: Adult   Pulse: 89   Temp:  "97.8 °F (36.6 °C)   SpO2: 97%   Weight: 113 kg (249 lb 3.2 oz)     Estimated body mass index is 40.22 kg/m² as calculated from the following:    Height as of 10/21/22: 167.6 cm (66\").    Weight as of this encounter: 113 kg (249 lb 3.2 oz).    Class 3 Severe Obesity (BMI >=40). Obesity-related health conditions include the following: dyslipidemias and osteoarthritis. Obesity is unchanged. BMI is is above average; BMI management plan is completed. We discussed portion control and increasing exercise.      Does the patient have evidence of cognitive impairment?   No    Lab Results   Component Value Date    TRIG 157 (H) 10/21/2022    HDL 56 10/21/2022    LDL 75 10/21/2022    VLDL 27 10/21/2022    HGBA1C 6.70 (H) 10/21/2022          HEALTH RISK ASSESSMENT    Smoking Status:  Social History     Tobacco Use   Smoking Status Never   • Passive exposure: Yes   Smokeless Tobacco Never     Alcohol Consumption:  Social History     Substance and Sexual Activity   Alcohol Use Yes    Comment: Rarely drinks     Fall Risk Screen:    STEADI Fall Risk Assessment has not been completed.    Depression Screening:  PHQ-2/PHQ-9 Depression Screening 10/21/2022   Retired PHQ-9 Total Score -   Retired Total Score -   Little Interest or Pleasure in Doing Things 0-->not at all   Feeling Down, Depressed or Hopeless 0-->not at all   PHQ-9: Brief Depression Severity Measure Score 0       Health Habits and Functional and Cognitive Screening:  No flowsheet data found.    Age-appropriate Screening Schedule:  Refer to the list below for future screening recommendations based on patient's age, sex and/or medical conditions. Orders for these recommended tests are listed in the plan section. The patient has been provided with a written plan.    Health Maintenance   Topic Date Due   • DXA SCAN  Never done   • ZOSTER VACCINE (2 of 2) 10/10/2022   • LIPID PANEL  10/21/2023   • MAMMOGRAM  11/23/2024   • TDAP/TD VACCINES (2 - Td or Tdap) 08/01/2026   • " INFLUENZA VACCINE  Completed                CMS Preventative Services Quick Reference  Risk Factors Identified During Encounter:    None Identified  Inactivity/Sedentary: discussed being more active    The above risks/problems have been discussed with the patient.  Pertinent information has been shared with the patient in the After Visit Summary.    Diagnoses and all orders for this visit:    1. Type 2 diabetes mellitus with hyperglycemia, without long-term current use of insulin (HCC) (Primary)  Comments:  will try mounjaro and metformin  Orders:  -     CBC & Differential  -     Comprehensive Metabolic Panel  -     Lipid Panel  -     TSH  -     MicroAlbumin, Urine, Random - Urine, Clean Catch  -     Hemoglobin A1c  -     Uric Acid    2. Allergic rhinitis, unspecified seasonality, unspecified trigger  Comments:  allergy shot  Orders:  -     patient supplied allergy injection  -     patient supplied allergy injection    3. Lower extremity edema  Comments:  increase lasix as needed  Orders:  -     Uric Acid    4. Chronic pain of right ankle  Comments:  likely fluid related  Orders:  -     Uric Acid    5. Essential hypertension  Comments:  well controlled, cont current meds    6. Mixed hyperlipidemia  Comments:  will check labs and adjust as needed    7. Constipation, unspecified constipation type  Comments:  cont fiber and miralax prn    8. Need for vaccination    Other orders  -     Tirzepatide (Mounjaro) 2.5 MG/0.5ML solution pen-injector; Inject 2.5 mg under the skin into the appropriate area as directed 1 (One) Time Per Week.  Dispense: 2.5 mL; Refill: 1  -     metFORMIN (Glucophage) 500 MG tablet; Take 1 tablet by mouth 2 (Two) Times a Day With Meals.  Dispense: 90 tablet; Refill: 2        Follow Up:   Next Medicare Wellness visit to be scheduled in 1 year.      An After Visit Summary and PPPS were made available to the patient.

## 2022-12-27 ENCOUNTER — PRIOR AUTHORIZATION (OUTPATIENT)
Dept: INTERNAL MEDICINE | Facility: CLINIC | Age: 67
End: 2022-12-27

## 2022-12-30 ENCOUNTER — CLINICAL SUPPORT (OUTPATIENT)
Dept: INTERNAL MEDICINE | Facility: CLINIC | Age: 67
End: 2022-12-30

## 2022-12-30 DIAGNOSIS — J30.9 ALLERGIC RHINITIS, UNSPECIFIED SEASONALITY, UNSPECIFIED TRIGGER: Primary | ICD-10-CM

## 2022-12-30 PROCEDURE — 95117 IMMUNOTHERAPY INJECTIONS: CPT | Performed by: INTERNAL MEDICINE

## 2023-01-11 ENCOUNTER — TELEPHONE (OUTPATIENT)
Dept: INTERNAL MEDICINE | Facility: CLINIC | Age: 68
End: 2023-01-11
Payer: COMMERCIAL

## 2023-01-11 NOTE — TELEPHONE ENCOUNTER
Caller: India Gaytan    Relationship: Self    Best call back number: [phone]    What is the best time to reach you: ANY     Who are you requesting to speak with (clinical staff, provider,  specific staff member): CLINICAL       What was the call regarding: PATIENT HAS HAD DIARRHEA FOR 3 DAYS NOW AND IS WANTING TO SPEAK WITH CLINICAL STAFF TO SEE IF THERE IS ANYTHING SHE CAN DO/TAKE TO HELP GET RID OF IT. PLEASE CALL AND ADVISE.     Do you require a callback: YES

## 2023-01-13 ENCOUNTER — CLINICAL SUPPORT (OUTPATIENT)
Dept: INTERNAL MEDICINE | Facility: CLINIC | Age: 68
End: 2023-01-13
Payer: COMMERCIAL

## 2023-01-13 DIAGNOSIS — J30.9 ALLERGIC RHINITIS, UNSPECIFIED SEASONALITY, UNSPECIFIED TRIGGER: Primary | ICD-10-CM

## 2023-01-14 RX ORDER — DICYCLOMINE HYDROCHLORIDE 10 MG/1
10 CAPSULE ORAL
Qty: 30 CAPSULE | Refills: 1 | Status: SHIPPED | OUTPATIENT
Start: 2023-01-14

## 2023-01-14 RX ORDER — SULFAMETHOXAZOLE AND TRIMETHOPRIM 800; 160 MG/1; MG/1
1 TABLET ORAL 2 TIMES DAILY
Qty: 20 TABLET | Refills: 0 | Status: SHIPPED | OUTPATIENT
Start: 2023-01-14

## 2023-01-14 NOTE — TELEPHONE ENCOUNTER
Called and spoke with pt.  She is having fevers and abd pain.  It is better than it was, but still not great.  She also feels like she is having head congestion.    Will treat with bactrim for presumed colitis.

## 2023-01-16 ENCOUNTER — HOSPITAL ENCOUNTER (OUTPATIENT)
Dept: BONE DENSITY | Facility: HOSPITAL | Age: 68
Discharge: HOME | End: 2023-01-16
Admitting: INTERNAL MEDICINE
Payer: COMMERCIAL

## 2023-01-16 DIAGNOSIS — Z78.0 POSTMENOPAUSE: ICD-10-CM

## 2023-01-16 PROCEDURE — 77080 DXA BONE DENSITY AXIAL: CPT

## 2023-01-27 ENCOUNTER — CLINICAL SUPPORT (OUTPATIENT)
Dept: INTERNAL MEDICINE | Facility: CLINIC | Age: 68
End: 2023-01-27
Payer: COMMERCIAL

## 2023-01-27 DIAGNOSIS — J30.9 ALLERGIC RHINITIS, UNSPECIFIED SEASONALITY, UNSPECIFIED TRIGGER: Primary | ICD-10-CM

## 2023-01-27 PROCEDURE — 95117 IMMUNOTHERAPY INJECTIONS: CPT | Performed by: INTERNAL MEDICINE

## 2023-02-09 NOTE — TELEPHONE ENCOUNTER
You do not meet the requirements of your plan.  Your plan covers this drug when your doctor provides documentation that you meet any of  these conditions:  - You have a clinical condition for which there is no appropriate formulary alternative  - You need a form of the drug that is not on the formulary  - You tried the required number of preferred formulary alternatives on your formulary first.  These drugs did not work for you or you cannot take them.  Your request has been denied based on the information we have.  Formulary alternative(s) are Ozempic, Rybelsus, Trulicity, Victoza.

## 2023-02-13 NOTE — TELEPHONE ENCOUNTER
Her insurance won't cover mounjaro, but will cover ozempic.  Is she ok trying that?  I don't think she has tried it before.

## 2023-02-14 RX ORDER — SEMAGLUTIDE 1.34 MG/ML
0.25 INJECTION, SOLUTION SUBCUTANEOUS WEEKLY
Qty: 3 ML | Refills: 1 | Status: SHIPPED | OUTPATIENT
Start: 2023-02-14

## 2023-02-17 ENCOUNTER — CLINICAL SUPPORT (OUTPATIENT)
Dept: INTERNAL MEDICINE | Facility: CLINIC | Age: 68
End: 2023-02-17
Payer: COMMERCIAL

## 2023-02-17 DIAGNOSIS — J30.9 ALLERGIC RHINITIS, UNSPECIFIED SEASONALITY, UNSPECIFIED TRIGGER: Primary | ICD-10-CM

## 2023-03-10 ENCOUNTER — CLINICAL SUPPORT (OUTPATIENT)
Dept: INTERNAL MEDICINE | Facility: CLINIC | Age: 68
End: 2023-03-10
Payer: COMMERCIAL

## 2023-03-10 DIAGNOSIS — J30.9 ALLERGIC RHINITIS, UNSPECIFIED SEASONALITY, UNSPECIFIED TRIGGER: Primary | ICD-10-CM

## 2023-03-29 ENCOUNTER — APPOINTMENT (OUTPATIENT)
Dept: GENERAL RADIOLOGY | Facility: HOSPITAL | Age: 68
End: 2023-03-29
Payer: COMMERCIAL

## 2023-03-29 ENCOUNTER — HOSPITAL ENCOUNTER (EMERGENCY)
Facility: HOSPITAL | Age: 68
Discharge: HOME | End: 2023-03-29
Attending: EMERGENCY MEDICINE | Admitting: EMERGENCY MEDICINE
Payer: COMMERCIAL

## 2023-03-29 VITALS
HEART RATE: 72 BPM | OXYGEN SATURATION: 96 % | SYSTOLIC BLOOD PRESSURE: 138 MMHG | WEIGHT: 249 LBS | RESPIRATION RATE: 18 BRPM | BODY MASS INDEX: 39.08 KG/M2 | DIASTOLIC BLOOD PRESSURE: 66 MMHG | TEMPERATURE: 98.3 F | HEIGHT: 67 IN
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
## 2023-03-29 PROCEDURE — 99283 EMERGENCY DEPT VISIT LOW MDM: CPT

## 2023-03-29 PROCEDURE — 72100 X-RAY EXAM L-S SPINE 2/3 VWS: CPT

## 2023-03-29 PROCEDURE — 72072 X-RAY EXAM THORAC SPINE 3VWS: CPT

## 2023-03-29 RX ORDER — KETOROLAC TROMETHAMINE 30 MG/ML
30 INJECTION, SOLUTION INTRAMUSCULAR; INTRAVENOUS ONCE
Status: DISCONTINUED | OUTPATIENT
Start: 2023-03-29 | End: 2023-03-29

## 2023-03-29 RX ORDER — CYCLOBENZAPRINE HCL 10 MG
10 TABLET ORAL ONCE
Status: COMPLETED | OUTPATIENT
Start: 2023-03-29 | End: 2023-03-29

## 2023-03-29 RX ORDER — CYCLOBENZAPRINE HCL 10 MG
10 TABLET ORAL 3 TIMES DAILY PRN
Qty: 21 TABLET | Refills: 0 | Status: SHIPPED | OUTPATIENT
Start: 2023-03-29

## 2023-03-29 RX ORDER — IBUPROFEN 400 MG/1
800 TABLET ORAL ONCE
Status: COMPLETED | OUTPATIENT
Start: 2023-03-29 | End: 2023-03-29

## 2023-03-29 RX ORDER — ORPHENADRINE CITRATE 30 MG/ML
60 INJECTION INTRAMUSCULAR; INTRAVENOUS ONCE
Status: DISCONTINUED | OUTPATIENT
Start: 2023-03-29 | End: 2023-03-29

## 2023-03-29 RX ADMIN — CYCLOBENZAPRINE 10 MG: 10 TABLET, FILM COATED ORAL at 20:03

## 2023-03-29 RX ADMIN — IBUPROFEN 800 MG: 400 TABLET, FILM COATED ORAL at 20:03
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
## 2023-03-30 NOTE — DISCHARGE INSTRUCTIONS
Take muscle relaxer as needed.  Do not operate a vehicle when taking this medication.  Take Tylenol or Motrin as needed for pain.

## 2023-04-04 ENCOUNTER — CLINICAL SUPPORT (OUTPATIENT)
Dept: INTERNAL MEDICINE | Facility: CLINIC | Age: 68
End: 2023-04-04
Payer: COMMERCIAL

## 2023-04-04 DIAGNOSIS — J30.9 ALLERGIC RHINITIS, UNSPECIFIED SEASONALITY, UNSPECIFIED TRIGGER: Primary | ICD-10-CM

## 2023-04-04 PROCEDURE — 36415 COLL VENOUS BLD VENIPUNCTURE: CPT | Performed by: INTERNAL MEDICINE

## 2023-04-24 ENCOUNTER — CLINICAL SUPPORT (OUTPATIENT)
Dept: INTERNAL MEDICINE | Facility: CLINIC | Age: 68
End: 2023-04-24
Payer: COMMERCIAL

## 2023-04-24 DIAGNOSIS — J30.9 ALLERGIC RHINITIS, UNSPECIFIED SEASONALITY, UNSPECIFIED TRIGGER: Primary | ICD-10-CM

## 2023-05-09 RX ORDER — FUROSEMIDE 20 MG/1
TABLET ORAL
Qty: 90 TABLET | Refills: 1 | Status: SHIPPED | OUTPATIENT
Start: 2023-05-09

## 2023-05-09 RX ORDER — POTASSIUM CHLORIDE 750 MG/1
CAPSULE, EXTENDED RELEASE ORAL
Qty: 180 CAPSULE | Refills: 1 | Status: SHIPPED | OUTPATIENT
Start: 2023-05-09

## 2023-05-09 RX ORDER — PANTOPRAZOLE SODIUM 40 MG/1
TABLET, DELAYED RELEASE ORAL
Qty: 90 TABLET | Refills: 1 | Status: SHIPPED | OUTPATIENT
Start: 2023-05-09

## 2023-05-09 RX ORDER — FENOFIBRATE 145 MG/1
145 TABLET, COATED ORAL DAILY
Qty: 90 TABLET | Refills: 1 | Status: SHIPPED | OUTPATIENT
Start: 2023-05-09

## 2023-05-12 ENCOUNTER — OFFICE VISIT (OUTPATIENT)
Dept: INTERNAL MEDICINE | Facility: CLINIC | Age: 68
End: 2023-05-12
Payer: MEDICARE

## 2023-05-12 VITALS
HEART RATE: 67 BPM | DIASTOLIC BLOOD PRESSURE: 70 MMHG | BODY MASS INDEX: 38.45 KG/M2 | OXYGEN SATURATION: 99 % | TEMPERATURE: 97.2 F | RESPIRATION RATE: 18 BRPM | WEIGHT: 245 LBS | HEIGHT: 67 IN | SYSTOLIC BLOOD PRESSURE: 132 MMHG

## 2023-05-12 DIAGNOSIS — I10 ESSENTIAL HYPERTENSION: Primary | ICD-10-CM

## 2023-05-12 DIAGNOSIS — E11.9 TYPE 2 DIABETES MELLITUS WITHOUT COMPLICATION, WITHOUT LONG-TERM CURRENT USE OF INSULIN: ICD-10-CM

## 2023-05-12 DIAGNOSIS — R60.0 LOWER EXTREMITY EDEMA: ICD-10-CM

## 2023-05-12 DIAGNOSIS — K21.9 GASTROESOPHAGEAL REFLUX DISEASE, UNSPECIFIED WHETHER ESOPHAGITIS PRESENT: ICD-10-CM

## 2023-05-12 DIAGNOSIS — E78.2 MIXED HYPERLIPIDEMIA: ICD-10-CM

## 2023-05-12 LAB
ALBUMIN SERPL-MCNC: 4.5 G/DL (ref 3.5–5.2)
ALBUMIN UR-MCNC: <1.2 MG/DL
ALBUMIN/GLOB SERPL: 1.5 G/DL
ALP SERPL-CCNC: 69 U/L (ref 39–117)
ALT SERPL W P-5'-P-CCNC: 37 U/L (ref 1–33)
ANION GAP SERPL CALCULATED.3IONS-SCNC: 8 MMOL/L (ref 5–15)
AST SERPL-CCNC: 51 U/L (ref 1–32)
BASOPHILS # BLD AUTO: 0.04 10*3/MM3 (ref 0–0.2)
BASOPHILS NFR BLD AUTO: 0.9 % (ref 0–1.5)
BILIRUB SERPL-MCNC: 0.2 MG/DL (ref 0–1.2)
BUN SERPL-MCNC: 23 MG/DL (ref 8–23)
BUN/CREAT SERPL: 25.8 (ref 7–25)
CALCIUM SPEC-SCNC: 9.9 MG/DL (ref 8.6–10.5)
CHLORIDE SERPL-SCNC: 99 MMOL/L (ref 98–107)
CHOLEST SERPL-MCNC: 131 MG/DL (ref 0–200)
CO2 SERPL-SCNC: 31 MMOL/L (ref 22–29)
CREAT SERPL-MCNC: 0.89 MG/DL (ref 0.57–1)
DEPRECATED RDW RBC AUTO: 44.6 FL (ref 37–54)
EGFRCR SERPLBLD CKD-EPI 2021: 72.1 ML/MIN/1.73
EOSINOPHIL # BLD AUTO: 0.1 10*3/MM3 (ref 0–0.4)
EOSINOPHIL NFR BLD AUTO: 2.3 % (ref 0.3–6.2)
ERYTHROCYTE [DISTWIDTH] IN BLOOD BY AUTOMATED COUNT: 14.1 % (ref 12.3–15.4)
GLOBULIN UR ELPH-MCNC: 3.1 GM/DL
GLUCOSE SERPL-MCNC: 84 MG/DL (ref 65–99)
HBA1C MFR BLD: 6 % (ref 4.8–5.6)
HCT VFR BLD AUTO: 40.2 % (ref 34–46.6)
HDLC SERPL-MCNC: 46 MG/DL (ref 40–60)
HGB BLD-MCNC: 12.9 G/DL (ref 12–15.9)
IMM GRANULOCYTES # BLD AUTO: 0.01 10*3/MM3 (ref 0–0.05)
IMM GRANULOCYTES NFR BLD AUTO: 0.2 % (ref 0–0.5)
LDLC SERPL CALC-MCNC: 62 MG/DL (ref 0–100)
LDLC/HDLC SERPL: 1.28 {RATIO}
LYMPHOCYTES # BLD AUTO: 1.72 10*3/MM3 (ref 0.7–3.1)
LYMPHOCYTES NFR BLD AUTO: 39.4 % (ref 19.6–45.3)
MCH RBC QN AUTO: 27.9 PG (ref 26.6–33)
MCHC RBC AUTO-ENTMCNC: 32.1 G/DL (ref 31.5–35.7)
MCV RBC AUTO: 86.8 FL (ref 79–97)
MONOCYTES # BLD AUTO: 0.55 10*3/MM3 (ref 0.1–0.9)
MONOCYTES NFR BLD AUTO: 12.6 % (ref 5–12)
NEUTROPHILS NFR BLD AUTO: 1.95 10*3/MM3 (ref 1.7–7)
NEUTROPHILS NFR BLD AUTO: 44.6 % (ref 42.7–76)
NRBC BLD AUTO-RTO: 0 /100 WBC (ref 0–0.2)
PLATELET # BLD AUTO: 398 10*3/MM3 (ref 140–450)
PMV BLD AUTO: 10.2 FL (ref 6–12)
POTASSIUM SERPL-SCNC: 4 MMOL/L (ref 3.5–5.2)
PROT SERPL-MCNC: 7.6 G/DL (ref 6–8.5)
RBC # BLD AUTO: 4.63 10*6/MM3 (ref 3.77–5.28)
SODIUM SERPL-SCNC: 138 MMOL/L (ref 136–145)
TRIGL SERPL-MCNC: 131 MG/DL (ref 0–150)
TSH SERPL DL<=0.05 MIU/L-ACNC: 1.24 UIU/ML (ref 0.27–4.2)
VLDLC SERPL-MCNC: 23 MG/DL (ref 5–40)
WBC NRBC COR # BLD: 4.37 10*3/MM3 (ref 3.4–10.8)

## 2023-05-12 PROCEDURE — 82043 UR ALBUMIN QUANTITATIVE: CPT | Performed by: INTERNAL MEDICINE

## 2023-05-12 PROCEDURE — 83036 HEMOGLOBIN GLYCOSYLATED A1C: CPT | Performed by: INTERNAL MEDICINE

## 2023-05-12 PROCEDURE — 85025 COMPLETE CBC W/AUTO DIFF WBC: CPT | Performed by: INTERNAL MEDICINE

## 2023-05-12 PROCEDURE — 80061 LIPID PANEL: CPT | Performed by: INTERNAL MEDICINE

## 2023-05-12 PROCEDURE — 84443 ASSAY THYROID STIM HORMONE: CPT | Performed by: INTERNAL MEDICINE

## 2023-05-12 PROCEDURE — 80053 COMPREHEN METABOLIC PANEL: CPT | Performed by: INTERNAL MEDICINE

## 2023-05-12 RX ORDER — SEMAGLUTIDE 1.34 MG/ML
0.5 INJECTION, SOLUTION SUBCUTANEOUS WEEKLY
Qty: 3 ML | Refills: 1 | Status: SHIPPED | OUTPATIENT
Start: 2023-05-12

## 2023-05-12 NOTE — PROGRESS NOTES
"Chief Complaint  Diabetes (2 month follow up Mounjaro- insurance wouldn't approve)    Subjective          India Gaytan presents to [facility]  History of Present Illness     The patient consented to the use of ARIEL.     The patient is a 67-year-old female who presents today for a follow-up visit.    Hypertension.   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 Her blood pressure was not rechecked at that time. She monitors her blood pressure at home which is usually around a systolic BP of 130 mmHg. Blood pressure this morning was 132/68 mmHg and was 132/70 mmHg at the clinic. Denies any chest pain, dyspnea, or palpitations.     Bilateral lower extremity edema.   She continues to have bilateral lower extremity edema which improves after driving and at night. Denies any hand of facial swelling.     GERD.  Her acid reflux has been stable with her dietary changes and pantoprazole. She noticed worsening of her acid reflux whenever she stops the medication.     Type 2 diabetes mellitus.   Her hemoglobin A1c last 10/21/2022 was 6.70 percent. She is currently taking Ozempic 0.25 mg once a week, and states that she has not notice any difference since she was started on the medication.     Diverticulosis.   Patient's diverticulosis has been stable and she denies any flare-ups.     Allergies  The patient states that she needed her allergy injections.     Social history.   The patient has been working as a  for 30 years.     Objective   Vital Signs:   /70 (BP Location: Right arm, Patient Position: Sitting, Cuff Size: Large Adult)   Pulse 67   Temp 97.2 °F (36.2 °C)   Resp 18   Ht 170.2 cm (67\")   Wt 111 kg (245 lb)   SpO2 99%   BMI 38.37 kg/m²     Physical Exam  Vitals reviewed.   Constitutional:       Appearance: Normal appearance. She is well-developed.   HENT:      Head: Normocephalic and atraumatic. "      Right Ear: External ear normal.      Left Ear: External ear normal.   Eyes:      Conjunctiva/sclera: Conjunctivae normal.      Pupils: Pupils are equal, round, and reactive to light.   Cardiovascular:      Rate and Rhythm: Normal rate and regular rhythm.      Heart sounds: No murmur heard.    No friction rub. No gallop.   Pulmonary:      Effort: Pulmonary effort is normal.      Breath sounds: Normal breath sounds. No wheezing or rhonchi.   Skin:     General: Skin is warm and dry.   Neurological:      Mental Status: She is alert and oriented to person, place, and time.   Psychiatric:         Mood and Affect: Affect normal.         Behavior: Behavior normal.         Thought Content: Thought content normal.        Result Review :       Common labs        10/21/2022    11:32 5/12/2023    11:03   Common Labs   Glucose 84   84     BUN 27   23     Creatinine 0.99   0.89     Sodium 138   138     Potassium 3.6   4.0     Chloride 97   99     Calcium 10.2   9.9     Albumin 4.30   4.5     Total Bilirubin 0.3   0.2     Alkaline Phosphatase 84   69     AST (SGOT) 21   51     ALT (SGPT) 20   37     WBC 7.68   4.37     Hemoglobin 12.7   12.9     Hematocrit 38.5   40.2     Platelets 434   398     Total Cholesterol 158   131     Triglycerides 157   131     HDL Cholesterol 56   46     LDL Cholesterol  75   62     Hemoglobin A1C 6.70   6.00     Microalbumin, Urine  <1.2         Results for orders placed or performed in visit on 05/12/23   Comprehensive Metabolic Panel    Specimen: Arm, Right; Blood   Result Value Ref Range    Glucose 84 65 - 99 mg/dL    BUN 23 8 - 23 mg/dL    Creatinine 0.89 0.57 - 1.00 mg/dL    Sodium 138 136 - 145 mmol/L    Potassium 4.0 3.5 - 5.2 mmol/L    Chloride 99 98 - 107 mmol/L    CO2 31.0 (H) 22.0 - 29.0 mmol/L    Calcium 9.9 8.6 - 10.5 mg/dL    Total Protein 7.6 6.0 - 8.5 g/dL    Albumin 4.5 3.5 - 5.2 g/dL    ALT (SGPT) 37 (H) 1 - 33 U/L    AST (SGOT) 51 (H) 1 - 32 U/L    Alkaline Phosphatase 69 39 -  117 U/L    Total Bilirubin 0.2 0.0 - 1.2 mg/dL    Globulin 3.1 gm/dL    A/G Ratio 1.5 g/dL    BUN/Creatinine Ratio 25.8 (H) 7.0 - 25.0    Anion Gap 8.0 5.0 - 15.0 mmol/L    eGFR 72.1 >60.0 mL/min/1.73   Lipid Panel    Specimen: Arm, Right; Blood   Result Value Ref Range    Total Cholesterol 131 0 - 200 mg/dL    Triglycerides 131 0 - 150 mg/dL    HDL Cholesterol 46 40 - 60 mg/dL    LDL Cholesterol  62 0 - 100 mg/dL    VLDL Cholesterol 23 5 - 40 mg/dL    LDL/HDL Ratio 1.28    TSH    Specimen: Arm, Right; Blood   Result Value Ref Range    TSH 1.240 0.270 - 4.200 uIU/mL   MicroAlbumin, Urine, Random - Urine, Clean Catch    Specimen: Urine, Clean Catch   Result Value Ref Range    Microalbumin, Urine <1.2 mg/dL   Hemoglobin A1c    Specimen: Arm, Right; Blood   Result Value Ref Range    Hemoglobin A1C 6.00 (H) 4.80 - 5.60 %   CBC Auto Differential    Specimen: Arm, Right; Blood   Result Value Ref Range    WBC 4.37 3.40 - 10.80 10*3/mm3    RBC 4.63 3.77 - 5.28 10*6/mm3    Hemoglobin 12.9 12.0 - 15.9 g/dL    Hematocrit 40.2 34.0 - 46.6 %    MCV 86.8 79.0 - 97.0 fL    MCH 27.9 26.6 - 33.0 pg    MCHC 32.1 31.5 - 35.7 g/dL    RDW 14.1 12.3 - 15.4 %    RDW-SD 44.6 37.0 - 54.0 fl    MPV 10.2 6.0 - 12.0 fL    Platelets 398 140 - 450 10*3/mm3    Neutrophil % 44.6 42.7 - 76.0 %    Lymphocyte % 39.4 19.6 - 45.3 %    Monocyte % 12.6 (H) 5.0 - 12.0 %    Eosinophil % 2.3 0.3 - 6.2 %    Basophil % 0.9 0.0 - 1.5 %    Immature Grans % 0.2 0.0 - 0.5 %    Neutrophils, Absolute 1.95 1.70 - 7.00 10*3/mm3    Lymphocytes, Absolute 1.72 0.70 - 3.10 10*3/mm3    Monocytes, Absolute 0.55 0.10 - 0.90 10*3/mm3    Eosinophils, Absolute 0.10 0.00 - 0.40 10*3/mm3    Basophils, Absolute 0.04 0.00 - 0.20 10*3/mm3    Immature Grans, Absolute 0.01 0.00 - 0.05 10*3/mm3    nRBC 0.0 0.0 - 0.2 /100 WBC            Procedures        Assessment and Plan    Diagnoses and all orders for this visit:    1. Essential hypertension (Primary)    2. Mixed hyperlipidemia  -      Lipid Panel    3. Lower extremity edema    4. Type 2 diabetes mellitus without complication, without long-term current use of insulin  -     CBC & Differential  -     Comprehensive Metabolic Panel  -     Lipid Panel  -     TSH  -     MicroAlbumin, Urine, Random - Urine, Clean Catch  -     Hemoglobin A1c    5. Gastroesophageal reflux disease, unspecified whether esophagitis present    Other orders  -     Semaglutide,0.25 or 0.5MG/DOS, (Ozempic, 0.25 or 0.5 MG/DOSE,) 2 MG/1.5ML solution pen-injector; Inject 0.5 mg under the skin into the appropriate area as directed 1 (One) Time Per Week.  Dispense: 3 mL; Refill: 1        Essential hypertension.  The patient will continue with her current antihypertensive medications. Advised the patient to monitor her sodium intake. She will continue monitoring her blood pressure at home. CBC and CMP ordered. Will be sending a letter to [facility]L regarding her hypertension. Follow-up in 3 months.     Mixed hyperlipidemia.   Advised the patient to improve her diet and exercise. Recommended carbohydrate counting. Lipid panel ordered.     Lower extremity edema.   She will continue with furosemide 20 mg tablet once a day. TSH ordered.     Type 2 diabetes mellitus without complication, without long-term current use of insulin.   The patient did not notice any difference with Ozempic 0.25. Will increase Ozempic to 0.5 mg once a week. Medication side effects were discussed with the patient. Hemoglobin A1c and urine microalbumin were ordered.     Gastroesophageal reflux disease, unspecified whether esophagitis present.   The patient will continue with pantoprazole 40 mg tablet once a day.        Class 2 Severe Obesity (BMI >=35 and <=39.9). Obesity-related health conditions include the following: hypertension and diabetes mellitus. Obesity is worsening. BMI is is above average; BMI management plan is completed. We discussed portion control and increasing exercise.            Follow Up   Return  in about 3 months (around 8/12/2023).  Patient was given instructions and counseling regarding her condition or for health maintenance advice. Please see specific information pulled into the AVS if appropriate.     Transcribed from ambient dictation for Lisa Jones MD by Jesus Hobbs.  05/12/23   14:02 EDT    Patient or patient representative verbalized consent to the visit recording.  I have personally performed the services described in this document as transcribed by the above individual, and it is both accurate and complete.

## 2023-05-12 NOTE — LETTER
May 12, 2023    India Gaytan  [address] [address]        To whom it may concern,  Mrs. Gaytan was seen in our clinic today and her blood pressure was well controlled in the 130/70 range she states this is what she normally sees at home.  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              Please call with any concerns thank you,          Lisa Jones MD

## 2023-05-16 ENCOUNTER — CLINICAL SUPPORT (OUTPATIENT)
Dept: INTERNAL MEDICINE | Facility: CLINIC | Age: 68
End: 2023-05-16
Payer: COMMERCIAL

## 2023-05-16 DIAGNOSIS — J30.9 ALLERGIC RHINITIS, UNSPECIFIED SEASONALITY, UNSPECIFIED TRIGGER: Primary | ICD-10-CM

## 2023-05-16 PROCEDURE — 95115 IMMUNOTHERAPY ONE INJECTION: CPT | Performed by: INTERNAL MEDICINE

## 2023-06-05 ENCOUNTER — TELEPHONE (OUTPATIENT)
Dept: INTERNAL MEDICINE | Facility: CLINIC | Age: 68
End: 2023-06-05
Payer: COMMERCIAL

## 2023-06-05 RX ORDER — SEMAGLUTIDE 1.34 MG/ML
0.5 INJECTION, SOLUTION SUBCUTANEOUS WEEKLY
Qty: 3 ML | Refills: 1 | Status: CANCELLED | OUTPATIENT
Start: 2023-06-05

## 2023-06-05 NOTE — TELEPHONE ENCOUNTER
Caller: India Gaytan    Relationship: Self    Best call back number: [phone]     What is the best time to reach you: 9AM - 1PM OR AFTER 4PM    Who are you requesting to speak with (clinical staff, provider,  specific staff member): DR DEAN      What was the call regarding: PATIENT STATES THAT HER INSURANCE IS REQUIRING A PRIOR AUTHORIZATION TO BE COMPLETED BEFORE THEY WILL APPROVE THE OZEMPIC AT THE 0.5 MG DOSE. PATIENT ALSO STATES THAT ON THE 0.5 MG DOSE SHE STILL COULD NOT TELL A DIFFERENCE IN HER APPETITE.     Is it okay if the provider responds through MyChart: YES

## 2023-06-06 ENCOUNTER — PRIOR AUTHORIZATION (OUTPATIENT)
Dept: INTERNAL MEDICINE | Facility: CLINIC | Age: 68
End: 2023-06-06
Payer: COMMERCIAL

## 2023-06-09 ENCOUNTER — CLINICAL SUPPORT (OUTPATIENT)
Dept: INTERNAL MEDICINE | Facility: CLINIC | Age: 68
End: 2023-06-09
Payer: COMMERCIAL

## 2023-06-09 DIAGNOSIS — J30.9 ALLERGIC RHINITIS, UNSPECIFIED SEASONALITY, UNSPECIFIED TRIGGER: Primary | ICD-10-CM

## 2023-06-09 NOTE — TELEPHONE ENCOUNTER
We’re pleased to let you know that we’ve approved your or your doctor’s request for coverage  for Ozempic 2 mg/1.5 ml Injection. You can now fill your prescription, and it will be covered  according to your plan.  As long as you remain covered by your prescription drug plan and there are no changes to your  plan benefits, this request is approved from 06/09/2023 to 06/09/2024. When this approval  expires, please speak to your doctor about your treatment.      Pt aware

## 2023-06-12 ENCOUNTER — TELEPHONE (OUTPATIENT)
Dept: INTERNAL MEDICINE | Facility: CLINIC | Age: 68
End: 2023-06-12
Payer: COMMERCIAL

## 2023-06-12 NOTE — TELEPHONE ENCOUNTER
Caller: India Gaytan    Relationship to patient: Self    Best call back number:    [phone]          Patient is needing:PATIENT STATES THAT SHE WAS TOLD BY [facility] THAT HER OZEMPIC WAS SENT TO [facility]!. PATIENT STATES THAT SHE CALLED [facility]! AND THEY DO NOT HAVE THE PRESCRIPTION.      HUB WAS UNABLE TO WARM TRANSFER.

## 2023-06-19 RX ORDER — SEMAGLUTIDE 1.34 MG/ML
0.5 INJECTION, SOLUTION SUBCUTANEOUS WEEKLY
Qty: 3 ML | Refills: 1 | Status: SHIPPED | OUTPATIENT
Start: 2023-06-19

## 2023-07-25 NOTE — TELEPHONE ENCOUNTER
Meds sent into pharmacy [General Appearance - Alert] : alert [General Appearance - In No Acute Distress] : in no acute distress [General Appearance - Well Nourished] : well nourished [General Appearance - Well Developed] : well developed [General Appearance - Well-Appearing] : healthy appearing [PERRL With Normal Accommodation] : pupils were equal in size, round, and reactive to light [Sclera] : the sclera and conjunctiva were normal [Extraocular Movements] : extraocular movements were intact [Optic Disc Abnormality] : the optic disc were normal in size and color [Outer Ear] : the ears and nose were normal in appearance [Hearing Threshold Finger Rub Not [place]] : hearing was normal [Examination Of The Oral Cavity] : the lips and gums were normal [Both Tympanic Membranes Were Examined] : both tympanic membranes were normal [Nasal Cavity] : the nasal mucosa and septum were normal [Oropharynx] : the oropharynx was normal [Neck Appearance] : the appearance of the neck was normal [Neck Cervical Mass (___cm)] : no neck mass was observed [Jugular Venous Distention Increased] : there was no jugular-venous distention [Thyroid Nodule] : there were no palpable thyroid nodules [Thyroid Diffuse Enlargement] : the thyroid was not enlarged [Respiration, Rhythm And Depth] : normal respiratory rhythm and effort [Exaggerated Use Of Accessory Muscles For Inspiration] : no accessory muscle use [Auscultation Breath Sounds / Voice Sounds] : lungs were clear to auscultation bilaterally [Chest Palpation] : palpation of the chest revealed no abnormalities [Lungs Percussion] : the lungs were normal to percussion [Heart Rate And Rhythm] : heart rate was normal and rhythm regular [Heart Sounds] : normal S1 and S2 [Heart Sounds Gallop] : no gallops [Murmurs] : no murmurs [Heart Sounds Pericardial Friction Rub] : no pericardial rub [Arterial Pulses Carotid] : carotid pulses were normal with no bruits [Abdominal Aorta] : the abdominal aorta was normal [Arterial Pulses Femoral] : femoral pulses were normal without bruits [Full Pulse] : the pedal pulses are present [Edema] : there was no peripheral edema [Bowel Sounds] : normal bowel sounds [Abdomen Soft] : soft [Abdomen Tenderness] : non-tender [Abdomen Hernia] : no hernia was discovered [Cervical Lymph Nodes Enlarged Posterior Bilaterally] : posterior cervical [Supraclavicular Lymph Nodes Enlarged Bilaterally] : supraclavicular [Axillary Lymph Nodes Enlarged Bilaterally] : axillary [No CVA Tenderness] : no ~M costovertebral angle tenderness [No Spinal Tenderness] : no spinal tenderness [Abnormal Walk] : normal gait [Nail Clubbing] : no clubbing  or cyanosis of the fingernails [Motor Tone] : muscle strength and tone were normal [Skin Color & Pigmentation] : normal skin color and pigmentation [Skin Turgor] : normal skin turgor [] : no rash [Skin Lesions] : no skin lesions [Cranial Nerves] : cranial nerves 2-12 were intact [Deep Tendon Reflexes (DTR)] : deep tendon reflexes were 2+ and symmetric [Sensation] : the sensory exam was normal to light touch and pinprick [Motor Exam] : the motor exam was normal [No Focal Deficits] : no focal deficits [Oriented To Time, Place, And Person] : oriented to person, place, and time [Impaired Insight] : insight and judgment were intact [Affect] : the affect was normal [Mood] : the mood was normal [Memory Recent] : recent memory was not impaired [Memory Remote] : remote memory was not impaired

## 2023-08-01 ENCOUNTER — OFFICE VISIT (OUTPATIENT)
Dept: INTERNAL MEDICINE | Facility: CLINIC | Age: 68
End: 2023-08-01
Payer: COMMERCIAL

## 2023-08-01 VITALS
OXYGEN SATURATION: 96 % | SYSTOLIC BLOOD PRESSURE: 102 MMHG | WEIGHT: 239.6 LBS | HEART RATE: 71 BPM | BODY MASS INDEX: 37.53 KG/M2 | DIASTOLIC BLOOD PRESSURE: 60 MMHG | TEMPERATURE: 97.3 F

## 2023-08-01 DIAGNOSIS — R60.0 LOWER EXTREMITY EDEMA: ICD-10-CM

## 2023-08-01 DIAGNOSIS — L82.1 SEBORRHEIC KERATOSIS: ICD-10-CM

## 2023-08-01 DIAGNOSIS — I10 ESSENTIAL HYPERTENSION: ICD-10-CM

## 2023-08-01 DIAGNOSIS — Z00.00 HEALTHCARE MAINTENANCE: ICD-10-CM

## 2023-08-01 DIAGNOSIS — M41.9 SCOLIOSIS, UNSPECIFIED SCOLIOSIS TYPE, UNSPECIFIED SPINAL REGION: ICD-10-CM

## 2023-08-01 DIAGNOSIS — E11.9 TYPE 2 DIABETES MELLITUS WITHOUT COMPLICATION, WITHOUT LONG-TERM CURRENT USE OF INSULIN: ICD-10-CM

## 2023-08-01 DIAGNOSIS — L72.3 SEBACEOUS CYST: Primary | ICD-10-CM

## 2023-08-01 DIAGNOSIS — K57.90 DIVERTICULOSIS: ICD-10-CM

## 2023-08-01 DIAGNOSIS — E78.2 MIXED HYPERLIPIDEMIA: ICD-10-CM

## 2023-08-01 DIAGNOSIS — Z23 NEED FOR VACCINATION: ICD-10-CM

## 2023-08-01 DIAGNOSIS — E11.9 ENCOUNTER FOR DIABETIC FOOT EXAM: ICD-10-CM

## 2023-08-01 DIAGNOSIS — M43.16 ANTEROLISTHESIS OF LUMBAR SPINE: ICD-10-CM

## 2023-08-01 PROBLEM — R22.2 MASS ON BACK: Status: RESOLVED | Noted: 2022-05-17 | Resolved: 2023-08-01

## 2023-08-01 LAB
ALBUMIN SERPL-MCNC: 4 G/DL (ref 3.5–5.2)
ALBUMIN UR-MCNC: <1.2 MG/DL
ALBUMIN/GLOB SERPL: 1.3 G/DL
ALP SERPL-CCNC: 74 U/L (ref 39–117)
ALT SERPL W P-5'-P-CCNC: 20 U/L (ref 1–33)
ANION GAP SERPL CALCULATED.3IONS-SCNC: 11.1 MMOL/L (ref 5–15)
AST SERPL-CCNC: 27 U/L (ref 1–32)
BASOPHILS # BLD AUTO: 0.04 10*3/MM3 (ref 0–0.2)
BASOPHILS NFR BLD AUTO: 0.7 % (ref 0–1.5)
BILIRUB SERPL-MCNC: <0.2 MG/DL (ref 0–1.2)
BUN SERPL-MCNC: 25 MG/DL (ref 8–23)
BUN/CREAT SERPL: 25 (ref 7–25)
CALCIUM SPEC-SCNC: 10.1 MG/DL (ref 8.6–10.5)
CHLORIDE SERPL-SCNC: 102 MMOL/L (ref 98–107)
CHOLEST SERPL-MCNC: 143 MG/DL (ref 0–200)
CO2 SERPL-SCNC: 26.9 MMOL/L (ref 22–29)
CREAT SERPL-MCNC: 1 MG/DL (ref 0.57–1)
DEPRECATED RDW RBC AUTO: 40.9 FL (ref 37–54)
EGFRCR SERPLBLD CKD-EPI 2021: 62.3 ML/MIN/1.73
EOSINOPHIL # BLD AUTO: 0.25 10*3/MM3 (ref 0–0.4)
EOSINOPHIL NFR BLD AUTO: 4.6 % (ref 0.3–6.2)
ERYTHROCYTE [DISTWIDTH] IN BLOOD BY AUTOMATED COUNT: 13.4 % (ref 12.3–15.4)
GLOBULIN UR ELPH-MCNC: 3.1 GM/DL
GLUCOSE SERPL-MCNC: 96 MG/DL (ref 65–99)
HBA1C MFR BLD: 6.2 % (ref 4.8–5.6)
HCT VFR BLD AUTO: 39.5 % (ref 34–46.6)
HDLC SERPL-MCNC: 41 MG/DL (ref 40–60)
HGB BLD-MCNC: 12.7 G/DL (ref 12–15.9)
IMM GRANULOCYTES # BLD AUTO: 0.02 10*3/MM3 (ref 0–0.05)
IMM GRANULOCYTES NFR BLD AUTO: 0.4 % (ref 0–0.5)
LDLC SERPL CALC-MCNC: 75 MG/DL (ref 0–100)
LDLC/HDLC SERPL: 1.74 {RATIO}
LYMPHOCYTES # BLD AUTO: 1.97 10*3/MM3 (ref 0.7–3.1)
LYMPHOCYTES NFR BLD AUTO: 35.9 % (ref 19.6–45.3)
MCH RBC QN AUTO: 27.1 PG (ref 26.6–33)
MCHC RBC AUTO-ENTMCNC: 32.2 G/DL (ref 31.5–35.7)
MCV RBC AUTO: 84.4 FL (ref 79–97)
MONOCYTES # BLD AUTO: 0.44 10*3/MM3 (ref 0.1–0.9)
MONOCYTES NFR BLD AUTO: 8 % (ref 5–12)
NEUTROPHILS NFR BLD AUTO: 2.76 10*3/MM3 (ref 1.7–7)
NEUTROPHILS NFR BLD AUTO: 50.4 % (ref 42.7–76)
NRBC BLD AUTO-RTO: 0 /100 WBC (ref 0–0.2)
PLATELET # BLD AUTO: 411 10*3/MM3 (ref 140–450)
PMV BLD AUTO: 10.1 FL (ref 6–12)
POTASSIUM SERPL-SCNC: 4.2 MMOL/L (ref 3.5–5.2)
PROT SERPL-MCNC: 7.1 G/DL (ref 6–8.5)
RBC # BLD AUTO: 4.68 10*6/MM3 (ref 3.77–5.28)
SODIUM SERPL-SCNC: 140 MMOL/L (ref 136–145)
TRIGL SERPL-MCNC: 154 MG/DL (ref 0–150)
TSH SERPL DL<=0.05 MIU/L-ACNC: 1.44 UIU/ML (ref 0.27–4.2)
VLDLC SERPL-MCNC: 27 MG/DL (ref 5–40)
WBC NRBC COR # BLD: 5.48 10*3/MM3 (ref 3.4–10.8)

## 2023-08-01 PROCEDURE — 80061 LIPID PANEL: CPT | Performed by: INTERNAL MEDICINE

## 2023-08-01 PROCEDURE — 82043 UR ALBUMIN QUANTITATIVE: CPT | Performed by: INTERNAL MEDICINE

## 2023-08-01 PROCEDURE — 83036 HEMOGLOBIN GLYCOSYLATED A1C: CPT | Performed by: INTERNAL MEDICINE

## 2023-08-01 PROCEDURE — 80050 GENERAL HEALTH PANEL: CPT | Performed by: INTERNAL MEDICINE

## 2023-08-01 RX ORDER — OLMESARTAN MEDOXOMIL AND HYDROCHLOROTHIAZIDE 20/12.5 20; 12.5 MG/1; MG/1
1 TABLET ORAL DAILY
Qty: 90 TABLET | Refills: 1 | Status: SHIPPED | OUTPATIENT
Start: 2023-08-01

## 2023-08-01 RX ORDER — SEMAGLUTIDE 1.34 MG/ML
1 INJECTION, SOLUTION SUBCUTANEOUS WEEKLY
Qty: 3 ML | Refills: 1 | Status: SHIPPED | OUTPATIENT
Start: 2023-08-01

## 2023-08-01 RX ORDER — SEMAGLUTIDE 1.34 MG/ML
0.5 INJECTION, SOLUTION SUBCUTANEOUS WEEKLY
Qty: 3 ML | Refills: 1 | Status: CANCELLED | OUTPATIENT
Start: 2023-08-01

## 2023-08-01 RX ORDER — DICYCLOMINE HYDROCHLORIDE 10 MG/1
10 CAPSULE ORAL
Qty: 30 CAPSULE | Refills: 1 | Status: SHIPPED | OUTPATIENT
Start: 2023-08-01

## 2023-08-01 RX ORDER — PANTOPRAZOLE SODIUM 40 MG/1
40 TABLET, DELAYED RELEASE ORAL DAILY
Qty: 90 TABLET | Refills: 1 | Status: SHIPPED | OUTPATIENT
Start: 2023-08-01

## 2023-08-01 RX ORDER — POTASSIUM CHLORIDE 750 MG/1
10 CAPSULE, EXTENDED RELEASE ORAL 2 TIMES DAILY
Qty: 180 CAPSULE | Refills: 1 | Status: SHIPPED | OUTPATIENT
Start: 2023-08-01

## 2023-08-01 RX ORDER — MONTELUKAST SODIUM 10 MG/1
10 TABLET ORAL NIGHTLY
Qty: 90 TABLET | Refills: 1 | Status: SHIPPED | OUTPATIENT
Start: 2023-08-01

## 2023-08-01 RX ORDER — FUROSEMIDE 20 MG/1
20 TABLET ORAL DAILY
Qty: 90 TABLET | Refills: 1 | Status: SHIPPED | OUTPATIENT
Start: 2023-08-01

## 2023-08-01 RX ORDER — FENOFIBRATE 145 MG/1
145 TABLET, COATED ORAL DAILY
Qty: 90 TABLET | Refills: 1 | Status: SHIPPED | OUTPATIENT
Start: 2023-08-01

## 2023-08-14 ENCOUNTER — CLINICAL SUPPORT (OUTPATIENT)
Dept: INTERNAL MEDICINE | Facility: CLINIC | Age: 68
End: 2023-08-14
Payer: COMMERCIAL

## 2023-08-14 DIAGNOSIS — J30.2 SEASONAL ALLERGIC RHINITIS, UNSPECIFIED TRIGGER: Primary | ICD-10-CM

## 2023-08-14 PROCEDURE — 95117 IMMUNOTHERAPY INJECTIONS: CPT | Performed by: INTERNAL MEDICINE

## 2023-08-14 NOTE — PROGRESS NOTES
Patient came into office this morning for administration of allergy injections x 2; tolerated well; left immediately following; no 15 min OBS.

## 2023-09-05 ENCOUNTER — CLINICAL SUPPORT (OUTPATIENT)
Dept: INTERNAL MEDICINE | Facility: CLINIC | Age: 68
End: 2023-09-05
Payer: COMMERCIAL

## 2023-09-05 DIAGNOSIS — J30.2 SEASONAL ALLERGIC RHINITIS, UNSPECIFIED TRIGGER: Primary | ICD-10-CM

## 2023-09-07 RX ORDER — SEMAGLUTIDE 1.34 MG/ML
1 INJECTION, SOLUTION SUBCUTANEOUS WEEKLY
Qty: 3 ML | Refills: 1 | Status: SHIPPED | OUTPATIENT
Start: 2023-09-07

## 2023-09-07 NOTE — TELEPHONE ENCOUNTER
Caller: nIdia Gaytan    Relationship: Self    Best call back number: [phone]    Requested Prescriptions:   Requested Prescriptions     Pending Prescriptions Disp Refills    Semaglutide, 1 MG/DOSE, (Ozempic, 1 MG/DOSE,) 4 MG/3ML solution pen-injector 3 mL 1     Sig: Inject 1 mg under the skin into the appropriate area as directed 1 (One) Time Per Week.        Pharmacy where request should be sent: [id number] - [place], [place] - [address] AT [facility] OF [address] & [place] - [phone] [facility] [phone] FX     Last office visit with prescribing clinician: 8/1/2023   Last telemedicine visit with prescribing clinician: Visit date not found   Next office visit with prescribing clinician: 12/20/2023     Does the patient have less than a 3 day supply:  [x] Yes  [] No

## 2023-09-29 ENCOUNTER — CLINICAL SUPPORT (OUTPATIENT)
Dept: INTERNAL MEDICINE | Facility: CLINIC | Age: 68
End: 2023-09-29
Payer: COMMERCIAL

## 2023-09-29 DIAGNOSIS — J30.9 ALLERGIC RHINITIS, UNSPECIFIED SEASONALITY, UNSPECIFIED TRIGGER: Primary | ICD-10-CM

## 2023-09-29 PROCEDURE — 95117 IMMUNOTHERAPY INJECTIONS: CPT | Performed by: INTERNAL MEDICINE

## 2023-09-29 NOTE — PROGRESS NOTES
Patient came into office this afternoon for administration of allergy injections x 2.  Tolerated well; left immediately following.  No 15 min OBS.

## 2023-10-20 ENCOUNTER — CLINICAL SUPPORT (OUTPATIENT)
Dept: INTERNAL MEDICINE | Facility: CLINIC | Age: 68
End: 2023-10-20
Payer: COMMERCIAL

## 2023-10-20 DIAGNOSIS — J30.2 SEASONAL ALLERGIC RHINITIS, UNSPECIFIED TRIGGER: Primary | ICD-10-CM

## 2023-10-20 PROCEDURE — 95117 IMMUNOTHERAPY INJECTIONS: CPT | Performed by: INTERNAL MEDICINE

## 2023-11-07 ENCOUNTER — TELEPHONE (OUTPATIENT)
Dept: INTERNAL MEDICINE | Facility: CLINIC | Age: 68
End: 2023-11-07
Payer: COMMERCIAL

## 2023-11-07 NOTE — TELEPHONE ENCOUNTER
Caller: India Gaytan    Relationship: Self    Best call back number: [phone]    What medication are you requesting: ANOTHER MEDICATION EQUAL TO OZEMPIC     If a prescription is needed, what is your preferred pharmacy and phone number: [id number] - [place], [place] - [address] [facility] AT [facility] OF [address] & [place] - [phone] [facility] [phone] FX     Additional notes:  PATIENT WAS PRESCRIBED OZEMPIC BUT IT IS OUT OF STOCK EVERYWHERE IN THE AREA. PATIENT IS WANTING TO KNOW IF THERE IS SOMETHING ELSE SHE CAN TAKE THAT WOULD BE EQUAL TO OZEMPIC BUT IN STOCK.

## 2023-11-13 RX ORDER — TIRZEPATIDE 2.5 MG/.5ML
2.5 INJECTION, SOLUTION SUBCUTANEOUS WEEKLY
Qty: 2.5 ML | Refills: 1 | Status: SHIPPED | OUTPATIENT
Start: 2023-11-13

## 2023-11-17 ENCOUNTER — CLINICAL SUPPORT (OUTPATIENT)
Dept: INTERNAL MEDICINE | Facility: CLINIC | Age: 68
End: 2023-11-17
Payer: COMMERCIAL

## 2023-11-17 DIAGNOSIS — J30.89 ENVIRONMENTAL AND SEASONAL ALLERGIES: Primary | ICD-10-CM

## 2023-11-17 PROCEDURE — 95117 IMMUNOTHERAPY INJECTIONS: CPT | Performed by: INTERNAL MEDICINE

## 2023-12-08 ENCOUNTER — CLINICAL SUPPORT (OUTPATIENT)
Dept: INTERNAL MEDICINE | Facility: CLINIC | Age: 68
End: 2023-12-08
Payer: COMMERCIAL

## 2023-12-08 DIAGNOSIS — J30.2 SEASONAL ALLERGIC RHINITIS, UNSPECIFIED TRIGGER: Primary | ICD-10-CM

## 2023-12-08 PROCEDURE — 95117 IMMUNOTHERAPY INJECTIONS: CPT | Performed by: INTERNAL MEDICINE

## 2023-12-20 ENCOUNTER — OFFICE VISIT (OUTPATIENT)
Dept: INTERNAL MEDICINE | Facility: CLINIC | Age: 68
End: 2023-12-20
Payer: COMMERCIAL

## 2023-12-20 VITALS
SYSTOLIC BLOOD PRESSURE: 132 MMHG | HEART RATE: 83 BPM | WEIGHT: 243 LBS | DIASTOLIC BLOOD PRESSURE: 78 MMHG | BODY MASS INDEX: 38.06 KG/M2 | TEMPERATURE: 96.8 F | OXYGEN SATURATION: 91 %

## 2023-12-20 DIAGNOSIS — I10 ESSENTIAL HYPERTENSION: ICD-10-CM

## 2023-12-20 DIAGNOSIS — L72.3 SEBACEOUS CYST: ICD-10-CM

## 2023-12-20 DIAGNOSIS — E11.9 TYPE 2 DIABETES MELLITUS WITHOUT COMPLICATION, WITHOUT LONG-TERM CURRENT USE OF INSULIN: Primary | ICD-10-CM

## 2023-12-20 RX ORDER — LEVOCETIRIZINE DIHYDROCHLORIDE 5 MG/1
5 TABLET, FILM COATED ORAL DAILY
Qty: 90 TABLET | Refills: 1 | Status: SHIPPED | OUTPATIENT
Start: 2023-12-20

## 2023-12-20 RX ORDER — OLMESARTAN MEDOXOMIL AND HYDROCHLOROTHIAZIDE 20/12.5 20; 12.5 MG/1; MG/1
1 TABLET ORAL DAILY
Qty: 90 TABLET | Refills: 1 | Status: SHIPPED | OUTPATIENT
Start: 2023-12-20

## 2023-12-20 RX ORDER — FUROSEMIDE 20 MG/1
20 TABLET ORAL DAILY
Qty: 90 TABLET | Refills: 1 | Status: SHIPPED | OUTPATIENT
Start: 2023-12-20

## 2023-12-20 RX ORDER — MONTELUKAST SODIUM 10 MG/1
10 TABLET ORAL NIGHTLY
Qty: 90 TABLET | Refills: 1 | Status: SHIPPED | OUTPATIENT
Start: 2023-12-20

## 2023-12-20 RX ORDER — POTASSIUM CHLORIDE 750 MG/1
10 CAPSULE, EXTENDED RELEASE ORAL 2 TIMES DAILY
Qty: 180 CAPSULE | Refills: 1 | Status: SHIPPED | OUTPATIENT
Start: 2023-12-20

## 2023-12-20 RX ORDER — PANTOPRAZOLE SODIUM 40 MG/1
40 TABLET, DELAYED RELEASE ORAL DAILY
Qty: 90 TABLET | Refills: 1 | Status: SHIPPED | OUTPATIENT
Start: 2023-12-20

## 2023-12-20 RX ORDER — FENOFIBRATE 145 MG/1
145 TABLET, COATED ORAL DAILY
Qty: 90 TABLET | Refills: 1 | Status: SHIPPED | OUTPATIENT
Start: 2023-12-20

## 2023-12-20 NOTE — TELEPHONE ENCOUNTER
Pt requesting refill     Allergy Relief 5mg  TRICOR 145MG  LASIX 20MG  SINGULAIR 10MG  BENICAR HCT 20-12.5MG  PROTONIX 40MG  MICRO-K 10MEQ CR    To be sent to [facility]

## 2023-12-20 NOTE — PROGRESS NOTES
Chief Complaint  Cyst (4 mo f/u)    Subjective      History of Present Illness  India Gaytan is a 68 y.o. female who presents to [facility] with a past medical history of  Past Medical History:   Diagnosis Date    Anemia     ASYMPTOMATIC    Anesthesia     SLOW TO WAKE UP POSTOP     Benign essential hypertension     Chronic allergic rhinitis     Essential hypertension     Gout 05/30/2019    greatly improved    High cholesterol     Hyperlipidemia     Lower extremity edema 09/18/2017    cont compression stockings can do lymphedema therapy if she wishes in the future    Lumbar back pain with radiculopathy affecting right lower extremity 06/12/2015    Mixed hyperlipidemia     Osteoarthritis of right hip 06/12/2015    Pain of right hip joint 09/18/2017    has chronic arthritis of that hip with tendonitits seen previously in MRI however she doesn't wish to do pt at this time, will try stretches that she has done in the past at home currently    PONV (postoperative nausea and vomiting)     Seasonal allergies     Ventral hernia CURRENTLY       No chset pain  No trouble breahting    States that she wasn't able to get her GLP-1 filled yet    Still with some swelling of her lower extremities but just from being on the bus    Mental health is good    Unable to tell how the cyst on her back is doing does feel an odd sensation there from time to time    Objective   Vital Signs:   Vitals:    12/20/23 1148   BP: 132/78   BP Location: Right arm   Patient Position: Sitting   Cuff Size: Large Adult   Pulse: 83   Temp: 96.8 °F (36 °C)   TempSrc: Temporal   SpO2: 91%   Weight: 110 kg (243 lb)     Body mass index is 38.06 kg/m².    Wt Readings from Last 3 Encounters:   12/20/23 110 kg (243 lb)   08/01/23 109 kg (239 lb 9.6 oz)   06/15/23 108 kg (239 lb)     BP Readings from Last 3 Encounters:   12/20/23 132/78   08/01/23 102/60   06/15/23 162/82       Health Maintenance   Topic Date  Due    COVID-19 Vaccine (4 - 2023-24 season) 09/01/2023    ANNUAL WELLNESS VISIT  12/16/2023    HEMOGLOBIN A1C  02/01/2024    DIABETIC EYE EXAM  04/20/2024    BMI FOLLOWUP  05/12/2024    DIABETIC FOOT EXAM  08/01/2024    LIPID PANEL  08/01/2024    URINE MICROALBUMIN  08/01/2024    MAMMOGRAM  11/23/2024    DXA SCAN  01/16/2025    TDAP/TD VACCINES (2 - Td or Tdap) 08/01/2026    COLORECTAL CANCER SCREENING  11/08/2027    HEPATITIS C SCREENING  Completed    INFLUENZA VACCINE  Completed    Pneumococcal Vaccine 65+  Completed    ZOSTER VACCINE  Completed       Physical Exam  Vitals reviewed.   Constitutional:       Appearance: Normal appearance. She is well-developed. She is obese.   HENT:      Head: Normocephalic and atraumatic.      Right Ear: External ear normal.      Left Ear: External ear normal.   Eyes:      Conjunctiva/sclera: Conjunctivae normal.      Pupils: Pupils are equal, round, and reactive to light.   Cardiovascular:      Rate and Rhythm: Normal rate and regular rhythm.      Heart sounds: No murmur heard.     No friction rub. No gallop.   Pulmonary:      Effort: Pulmonary effort is normal.      Breath sounds: Normal breath sounds. No wheezing or rhonchi.   Skin:     General: Skin is warm and dry.   Neurological:      Mental Status: She is alert and oriented to person, place, and time.   Psychiatric:         Mood and Affect: Affect normal.         Behavior: Behavior normal.         Thought Content: Thought content normal.            Result Review :  The following data was reviewed by: Lisa Jones MD on 12/20/2023:      Procedures        Assessment and Plan   Diagnoses and all orders for this visit:    1. Type 2 diabetes mellitus without complication, without long-term current use of insulin (Primary)  Comments:  Will try Mounjaro discussed side effects can increase dose in 4 weeks if she prefers    2. Essential hypertension  Comments:  Slightly elevated she will monitor and let us know if it remains  elevated    3. Sebaceous cyst  Comments:  Appears to be healing well no issues or concerns    Other orders  -     Tirzepatide (Mounjaro) 2.5 MG/0.5ML solution pen-injector pen; Inject 0.5 mL under the skin into the appropriate area as directed 1 (One) Time Per Week.  Dispense: 2.5 mL; Refill: 1  -     Fluzone High-Dose 65+yrs                  FOLLOW UP  Return in about 4 months (around 4/20/2024).  Patient was given instructions and counseling regarding her condition or for health maintenance advice. Please see specific information pulled into the AVS if appropriate.       Lisa Jones MD  12/22/23  17:56 EST    CURRENT & DISCONTINUED MEDICATIONS  Current Outpatient Medications   Medication Instructions    albuterol sulfate  (90 Base) MCG/ACT inhaler 2 puffs, Inhalation, Every 4 Hours PRN    Allergy Relief 5 mg, Oral, Daily    azelastine (ASTELIN) 0.1 % nasal spray SPRAY ONE TO TWO SPRAYS IN each nostril TWICE DAILY    dicyclomine (BENTYL) 10 mg, Oral, 4 Times Daily Before Meals & Nightly    EPINEPHrine (EPIPEN) 0.3 MG/0.3ML solution auto-injector injection Use as directed for acute allergic reaction.    fenofibrate (TRICOR) 145 mg, Oral, Daily    fluticasone (FLONASE) 50 MCG/ACT nasal spray SPRAY TWO SPRAY IN each nostril EVERY DAY    furosemide (LASIX) 20 mg, Oral, Daily    montelukast (SINGULAIR) 10 mg, Oral, Nightly    olmesartan-hydrochlorothiazide (BENICAR HCT) 20-12.5 MG per tablet 1 tablet, Oral, Daily    pantoprazole (PROTONIX) 40 mg, Oral, Daily    Pataday 0.7 % solution 1 drop, Both Eyes, Daily PRN, NOT USED IN A WHILE    potassium chloride (MICRO-K) 10 MEQ CR capsule 10 mEq, Oral, 2 Times Daily    Tirzepatide (MOUNJARO) 2.5 mg, Subcutaneous, Weekly    VITAMIN D, ERGOCALCIFEROL, PO Oral, Daily       Medications Discontinued During This Encounter   Medication Reason    Tirzepatide (Mounjaro) 2.5 MG/0.5ML solution pen-injector Reorder

## 2023-12-20 NOTE — PROGRESS NOTES
The ABCs of the Annual Wellness Visit  Subsequent Medicare Wellness Visit    Subjective      India Gaytan is a 68 y.o. female who presents for a Subsequent Medicare Wellness Visit.    The following portions of the patient's history were reviewed and   updated as appropriate: allergies, current medications, past family history, past medical history, past social history, past surgical history, and problem list.    Compared to one year ago, the patient feels her physical   health is the same.    Compared to one year ago, the patient feels her mental   health is the same.    Recent Hospitalizations:  She was not admitted to the hospital during the last year.       Current Medical Providers:  Patient Care Team:  Lisa Jones MD as PCP - General (Internal Medicine)  Aly Rangel MD as Consulting Physician (Gastroenterology)  Georgia Herrera APRN as Nurse Practitioner (Nurse Practitioner)  Lavonne Mcdowell APRN as Nurse Practitioner (Gastroenterology)    Outpatient Medications Prior to Visit   Medication Sig Dispense Refill    albuterol sulfate  (90 Base) MCG/ACT inhaler Inhale 2 puffs Every 4 (Four) Hours As Needed for Wheezing or Shortness of Air (USES FOR ALLEGERIES).      Allergy Relief 5 MG tablet Take 1 tablet by mouth Daily.      azelastine (ASTELIN) 0.1 % nasal spray SPRAY ONE TO TWO SPRAYS IN each nostril TWICE DAILY      dicyclomine (BENTYL) 10 MG capsule Take 1 capsule by mouth 4 (Four) Times a Day Before Meals & at Bedtime. 30 capsule 1    EPINEPHrine (EPIPEN) 0.3 MG/0.3ML solution auto-injector injection Use as directed for acute allergic reaction.      fenofibrate (TRICOR) 145 MG tablet Take 1 tablet by mouth Daily. 90 tablet 1    fluticasone (FLONASE) 50 MCG/ACT nasal spray SPRAY TWO SPRAY IN each nostril EVERY DAY      furosemide (LASIX) 20 MG tablet Take 1 tablet by mouth Daily. 90 tablet 1    montelukast (SINGULAIR) 10 MG tablet Take 1 tablet by mouth Every Night. 90 tablet 1     olmesartan-hydrochlorothiazide (BENICAR HCT) 20-12.5 MG per tablet Take 1 tablet by mouth Daily. 90 tablet 1    pantoprazole (PROTONIX) 40 MG EC tablet Take 1 tablet by mouth Daily. 90 tablet 1    Pataday 0.7 % solution Administer 1 drop to both eyes Daily As Needed. NOT USED IN A WHILE      potassium chloride (MICRO-K) 10 MEQ CR capsule Take 1 capsule by mouth 2 (Two) Times a Day. 180 capsule 1    VITAMIN D, ERGOCALCIFEROL, PO Take  by mouth Daily.      Tirzepatide (Mounjaro) 2.5 MG/0.5ML solution pen-injector Inject 0.5 mL under the skin into the appropriate area as directed 1 (One) Time Per Week. 2.5 mL 1     Facility-Administered Medications Prior to Visit   Medication Dose Route Frequency Provider Last Rate Last Admin    patient supplied allergy injection  0.5 mL Subcutaneous Q28 Days Lisa Jones MD   0.5 mL at 12/08/23 0923    patient supplied allergy injection  0.5 mL Subcutaneous Q28 Days Lisa Jones MD   0.5 mL at 12/08/23 0920       No opioid medication identified on active medication list. I have reviewed chart for other potential  high risk medication/s and harmful drug interactions in the elderly.        Aspirin is not on active medication list.  Aspirin use is not indicated based on review of current medical condition/s. Risk of harm outweighs potential benefits.  .    Patient Active Problem List   Diagnosis    Anemia    Essential hypertension    Gout    Lower extremity edema    Mixed hyperlipidemia    Neuralgia, neuritis or radiculitis    Osteoarthritis of hip    Seasonal allergic rhinitis    Incisional hernia, without obstruction or gangrene    Ventral incisional hernia    Status post hernia repair    Heart murmur    Diverticulosis    Personal history of colonic polyps    Encounter for screening for malignant neoplasm of colon    Type 2 diabetes mellitus without complication, without long-term current use of insulin    Scoliosis    Anterolisthesis of lumbar spine    Seborrheic  "keratosis    Sebaceous cyst     Advance Care Planning   Advance Care Planning     Advance Directive is not on file.  ACP discussion was held with the patient during this visit. Patient does not have an advance directive, information provided.     Objective    Vitals:    23 1148   BP: 132/78   BP Location: Right arm   Patient Position: Sitting   Cuff Size: Large Adult   Pulse: 83   Temp: 96.8 °F (36 °C)   TempSrc: Temporal   SpO2: 91%   Weight: 110 kg (243 lb)     Estimated body mass index is 38.06 kg/m² as calculated from the following:    Height as of 6/15/23: 170.2 cm (67\").    Weight as of this encounter: 110 kg (243 lb).           Does the patient have evidence of cognitive impairment?   No            HEALTH RISK ASSESSMENT    Smoking Status:  Social History     Tobacco Use   Smoking Status Never    Passive exposure: Never   Smokeless Tobacco Never     Alcohol Consumption:  Social History     Substance and Sexual Activity   Alcohol Use Yes    Comment: Rarely drinks     Fall Risk Screen:    STEADI Fall Risk Assessment has not been completed.    Depression Screenin/12/2023    10:01 AM   PHQ-2/PHQ-9 Depression Screening   Little Interest or Pleasure in Doing Things 0-->not at all   Feeling Down, Depressed or Hopeless 0-->not at all   PHQ-9: Brief Depression Severity Measure Score 0       Health Habits and Functional and Cognitive Screenin/20/2023    12:00 PM   Functional & Cognitive Status   Do you have difficulty preparing food and eating? No   Do you have difficulty bathing yourself, getting dressed or grooming yourself? No   Do you have difficulty using the toilet? No   Do you have difficulty moving around from place to place? No   Do you have trouble with steps or getting out of a bed or a chair? No   Do you need help using the phone?  No   Are you deaf or do you have serious difficulty hearing?  No   Do you need help to go to places out of walking distance? No   Do you need help " shopping? No   Do you need help preparing meals?  No   Do you need help with housework?  No   Do you need help with laundry? No   Do you need help taking your medications? No   Do you need help managing money? No   Do you ever drive or ride in a car without wearing a seat belt? No   Have you felt unusual stress, anger or loneliness in the last month? No   Do you have difficulty concentrating, remembering or making decisions? No       Age-appropriate Screening Schedule:  Refer to the list below for future screening recommendations based on patient's age, sex and/or medical conditions. Orders for these recommended tests are listed in the plan section. The patient has been provided with a written plan.    Health Maintenance   Topic Date Due    INFLUENZA VACCINE  08/01/2023    COVID-19 Vaccine (4 - 2023-24 season) 09/01/2023    ANNUAL WELLNESS VISIT  12/16/2023    HEMOGLOBIN A1C  02/01/2024    DIABETIC EYE EXAM  04/20/2024    BMI FOLLOWUP  05/12/2024    DIABETIC FOOT EXAM  08/01/2024    LIPID PANEL  08/01/2024    URINE MICROALBUMIN  08/01/2024    MAMMOGRAM  11/23/2024    DXA SCAN  01/16/2025    TDAP/TD VACCINES (2 - Td or Tdap) 08/01/2026    COLORECTAL CANCER SCREENING  11/08/2027    HEPATITIS C SCREENING  Completed    Pneumococcal Vaccine 65+  Completed    ZOSTER VACCINE  Completed                  CMS Preventative Services Quick Reference  Risk Factors Identified During Encounter:    None Identified    The above risks/problems have been discussed with the patient.  Pertinent information has been shared with the patient in the After Visit Summary.    Diagnoses and all orders for this visit:    1. Type 2 diabetes mellitus without complication, without long-term current use of insulin (Primary)    Other orders  -     Tirzepatide (Mounjaro) 2.5 MG/0.5ML solution pen-injector pen; Inject 0.5 mL under the skin into the appropriate area as directed 1 (One) Time Per Week.  Dispense: 2.5 mL; Refill: 1  -     Fluzone High-Dose  65+yrs        Follow Up:   Next Medicare Wellness visit to be scheduled in 1 year.      An After Visit Summary and PPPS were made available to the patient.

## 2023-12-27 ENCOUNTER — TELEPHONE (OUTPATIENT)
Dept: INTERNAL MEDICINE | Facility: CLINIC | Age: 68
End: 2023-12-27
Payer: COMMERCIAL

## 2023-12-27 DIAGNOSIS — M25.511 RIGHT SHOULDER PAIN, UNSPECIFIED CHRONICITY: Primary | ICD-10-CM

## 2023-12-27 NOTE — TELEPHONE ENCOUNTER
Caller: India Gaytan    Relationship: Self    Best call back number: [phone]    What orders are you requesting (i.e. lab or imaging): MRI OF THE RIGHT ARM AND SHOULDER     In what timeframe would the patient need to come in: ASAP     Where will you receive your lab/imaging services: [facility]     Additional notes: PATIENT STATES SHE HEARD A POP WHEN MOVING HER ARM THIS MORNING. PATIENT STATES NOW HER ARM FEELS LIKE DEAD WEIGHT AND IS HAVING PAIN/TROUBLE MOVING IT. PATIENT STATES SHE HAS SPOKE WITH MD DEAN ABOUT THIS PROBLEM IN HER ARM BEFORE AND WAS TOLD AN MRI WOULD BE POSSIBLE TO CHECK FOR ANY PROBLEMS.

## 2023-12-27 NOTE — TELEPHONE ENCOUNTER
Caller: Idnia Gaytan    Relationship to patient: Self    Best call back number: [phone]    Patient is needing: PATIENT CALLED STATING SHE WAS TOLD TO REACH OUT IF [facility] WAS STILL UNABLE TO FILL HER PRESCRIPTION FOR MOUNJARO AND LET MD DEAN KNOW. PATIENT STATES THE PHARMACY IS STILL SAYING THEY ARE NEEDING A PA AND HAVE NEVER RECEIVED THE FIRST ONE SENT. PATIENT ALSO NEEDING THE REST OF HER MEDICATION THAT WAS SENT ON 12.20.23 RESUBMITTED TO THE [facility]. CALLER STATES THE ONLY MEDICATION SUPPOSED TO GO TO [facility] IS THE MOUNJARO AND SHE IS ALMOST OUT OF BLOOD PRESSURE MEDICATION.

## 2023-12-27 NOTE — TELEPHONE ENCOUNTER
Called pt and informed her that order was placed as stat and scheduling will reach out to her and get her scheduled.

## 2024-01-02 NOTE — TELEPHONE ENCOUNTER
Hub staff attempted to follow warm transfer process and was unsuccessful     Caller: India Gaytan    Relationship to patient: Self    Best call back number:     [phone]       Patient is needing: PATIENT STATES THAT NO ONE  EVER CALLED HER ABOUT IF THE MRI WAS APPROVED.

## 2024-01-05 ENCOUNTER — CLINICAL SUPPORT (OUTPATIENT)
Dept: INTERNAL MEDICINE | Facility: CLINIC | Age: 69
End: 2024-01-05
Payer: COMMERCIAL

## 2024-01-05 DIAGNOSIS — J30.2 SEASONAL ALLERGIC RHINITIS, UNSPECIFIED TRIGGER: Primary | ICD-10-CM

## 2024-01-05 PROCEDURE — 95117 IMMUNOTHERAPY INJECTIONS: CPT | Performed by: INTERNAL MEDICINE

## 2024-01-25 ENCOUNTER — TELEPHONE (OUTPATIENT)
Dept: INTERNAL MEDICINE | Facility: CLINIC | Age: 69
End: 2024-01-25
Payer: COMMERCIAL

## 2024-02-16 ENCOUNTER — CLINICAL SUPPORT (OUTPATIENT)
Dept: INTERNAL MEDICINE | Facility: CLINIC | Age: 69
End: 2024-02-16
Payer: COMMERCIAL

## 2024-02-16 DIAGNOSIS — J30.2 SEASONAL ALLERGIC RHINITIS, UNSPECIFIED TRIGGER: Primary | ICD-10-CM

## 2024-02-16 PROCEDURE — 95117 IMMUNOTHERAPY INJECTIONS: CPT | Performed by: INTERNAL MEDICINE

## 2024-03-29 ENCOUNTER — CLINICAL SUPPORT (OUTPATIENT)
Dept: INTERNAL MEDICINE | Facility: CLINIC | Age: 69
End: 2024-03-29
Payer: COMMERCIAL

## 2024-03-29 DIAGNOSIS — J30.2 SEASONAL ALLERGIC RHINITIS, UNSPECIFIED TRIGGER: Primary | ICD-10-CM

## 2024-03-29 PROCEDURE — 95117 IMMUNOTHERAPY INJECTIONS: CPT | Performed by: INTERNAL MEDICINE

## 2024-03-29 NOTE — PROGRESS NOTES
Patient came into office this afternoon for administration of allergy injections x 2.  Tolerated well; left immediately following.  No 15 min OBS.    Jo Bhandari RN BSN  [facility]

## 2024-04-22 ENCOUNTER — OFFICE VISIT (OUTPATIENT)
Dept: INTERNAL MEDICINE | Facility: CLINIC | Age: 69
End: 2024-04-22
Payer: COMMERCIAL

## 2024-04-22 VITALS
SYSTOLIC BLOOD PRESSURE: 118 MMHG | BODY MASS INDEX: 36.51 KG/M2 | DIASTOLIC BLOOD PRESSURE: 66 MMHG | TEMPERATURE: 97.4 F | RESPIRATION RATE: 20 BRPM | WEIGHT: 232.6 LBS | OXYGEN SATURATION: 96 % | HEIGHT: 67 IN | HEART RATE: 63 BPM

## 2024-04-22 DIAGNOSIS — E78.2 MIXED HYPERLIPIDEMIA: ICD-10-CM

## 2024-04-22 DIAGNOSIS — I10 ESSENTIAL HYPERTENSION: ICD-10-CM

## 2024-04-22 DIAGNOSIS — N64.9 BREAST LESION: ICD-10-CM

## 2024-04-22 DIAGNOSIS — L98.9 SKIN LESION: ICD-10-CM

## 2024-04-22 DIAGNOSIS — R60.0 LOWER EXTREMITY EDEMA: Primary | ICD-10-CM

## 2024-04-22 DIAGNOSIS — E55.9 VITAMIN D DEFICIENCY: ICD-10-CM

## 2024-04-22 DIAGNOSIS — E11.9 TYPE 2 DIABETES MELLITUS WITHOUT COMPLICATION, WITHOUT LONG-TERM CURRENT USE OF INSULIN: ICD-10-CM

## 2024-04-22 LAB
ALBUMIN SERPL-MCNC: 4.3 G/DL (ref 3.5–5.2)
ALBUMIN UR-MCNC: <1.2 MG/DL
ALBUMIN/GLOB SERPL: 1.4 G/DL
ALP SERPL-CCNC: 76 U/L (ref 39–117)
ALT SERPL W P-5'-P-CCNC: 18 U/L (ref 1–33)
ANION GAP SERPL CALCULATED.3IONS-SCNC: 12.4 MMOL/L (ref 5–15)
AST SERPL-CCNC: 24 U/L (ref 1–32)
BASOPHILS # BLD AUTO: 0.05 10*3/MM3 (ref 0–0.2)
BASOPHILS NFR BLD AUTO: 0.7 % (ref 0–1.5)
BILIRUB SERPL-MCNC: 0.2 MG/DL (ref 0–1.2)
BUN SERPL-MCNC: 28 MG/DL (ref 8–23)
BUN/CREAT SERPL: 28.6 (ref 7–25)
CALCIUM SPEC-SCNC: 10 MG/DL (ref 8.6–10.5)
CHLORIDE SERPL-SCNC: 98 MMOL/L (ref 98–107)
CHOLEST SERPL-MCNC: 140 MG/DL (ref 0–200)
CO2 SERPL-SCNC: 26.6 MMOL/L (ref 22–29)
CREAT SERPL-MCNC: 0.98 MG/DL (ref 0.57–1)
DEPRECATED RDW RBC AUTO: 42.9 FL (ref 37–54)
EGFRCR SERPLBLD CKD-EPI 2021: 63.8 ML/MIN/1.73
EOSINOPHIL # BLD AUTO: 0.23 10*3/MM3 (ref 0–0.4)
EOSINOPHIL NFR BLD AUTO: 3.4 % (ref 0.3–6.2)
ERYTHROCYTE [DISTWIDTH] IN BLOOD BY AUTOMATED COUNT: 13.8 % (ref 12.3–15.4)
GLOBULIN UR ELPH-MCNC: 3.1 GM/DL
GLUCOSE SERPL-MCNC: 81 MG/DL (ref 65–99)
HBA1C MFR BLD: 5.8 % (ref 4.8–5.6)
HCT VFR BLD AUTO: 40 % (ref 34–46.6)
HDLC SERPL-MCNC: 45 MG/DL (ref 40–60)
HGB BLD-MCNC: 12.9 G/DL (ref 12–15.9)
IMM GRANULOCYTES # BLD AUTO: 0.01 10*3/MM3 (ref 0–0.05)
IMM GRANULOCYTES NFR BLD AUTO: 0.1 % (ref 0–0.5)
LDLC SERPL CALC-MCNC: 70 MG/DL (ref 0–100)
LDLC/HDLC SERPL: 1.47 {RATIO}
LYMPHOCYTES # BLD AUTO: 2.24 10*3/MM3 (ref 0.7–3.1)
LYMPHOCYTES NFR BLD AUTO: 32.7 % (ref 19.6–45.3)
MCH RBC QN AUTO: 27.6 PG (ref 26.6–33)
MCHC RBC AUTO-ENTMCNC: 32.3 G/DL (ref 31.5–35.7)
MCV RBC AUTO: 85.5 FL (ref 79–97)
MONOCYTES # BLD AUTO: 0.42 10*3/MM3 (ref 0.1–0.9)
MONOCYTES NFR BLD AUTO: 6.1 % (ref 5–12)
NEUTROPHILS NFR BLD AUTO: 3.91 10*3/MM3 (ref 1.7–7)
NEUTROPHILS NFR BLD AUTO: 57 % (ref 42.7–76)
NRBC BLD AUTO-RTO: 0 /100 WBC (ref 0–0.2)
PLATELET # BLD AUTO: 431 10*3/MM3 (ref 140–450)
PMV BLD AUTO: 10.2 FL (ref 6–12)
POTASSIUM SERPL-SCNC: 4.2 MMOL/L (ref 3.5–5.2)
PROT SERPL-MCNC: 7.4 G/DL (ref 6–8.5)
RBC # BLD AUTO: 4.68 10*6/MM3 (ref 3.77–5.28)
SODIUM SERPL-SCNC: 137 MMOL/L (ref 136–145)
TRIGL SERPL-MCNC: 145 MG/DL (ref 0–150)
TSH SERPL DL<=0.05 MIU/L-ACNC: 1.16 UIU/ML (ref 0.27–4.2)
VLDLC SERPL-MCNC: 25 MG/DL (ref 5–40)
WBC NRBC COR # BLD AUTO: 6.86 10*3/MM3 (ref 3.4–10.8)

## 2024-04-22 PROCEDURE — 83036 HEMOGLOBIN GLYCOSYLATED A1C: CPT | Performed by: INTERNAL MEDICINE

## 2024-04-22 PROCEDURE — 80061 LIPID PANEL: CPT | Performed by: INTERNAL MEDICINE

## 2024-04-22 PROCEDURE — 82043 UR ALBUMIN QUANTITATIVE: CPT | Performed by: INTERNAL MEDICINE

## 2024-04-22 PROCEDURE — 99214 OFFICE O/P EST MOD 30 MIN: CPT | Performed by: INTERNAL MEDICINE

## 2024-04-22 PROCEDURE — 80050 GENERAL HEALTH PANEL: CPT | Performed by: INTERNAL MEDICINE

## 2024-04-22 NOTE — PROGRESS NOTES
Chief Complaint  Follow up for diabetes      Subjective      History of Present Illness  The patient presents for evaluation of multiple medical concerns.    The patient is currently on Mounjaro for diabetes management, however, she is experiencing concerns about the medication's shortage. She reports experiencing diarrhea, gas, and nausea, which typically commences on the first day of Mounjaro administration and subsides by Friday morning. She manages her diarrhea with Imodium, taking 2 tablets in the morning. Despite not having experienced diarrhea since last Tuesday, she reports a decrease in gas. She has previously tried Ozempic, but found it challenging to obtain it. She believes that mounjaro has contributed to her weight loss, but continues to experience diarrhea and gas. She attempts to take the medication in the morning, abstaining from greasy foods, and maintains a careful diet to avoid binge-eating. She occasionally takes anti-nausea medication.    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 The swelling is absent during weekends, but worsens once she commences the bus. She wears compression stockings in winter, but does not wear them in the summer. The swelling subsides by morning. She is unable to elevate her legs at work, but attempts to walk between bus runs if she has time.    The patient denies experiencing any chest discomfort or difficulty breathing. She does not monitor her blood pressure at home, but attempts to monitor her salt intake.    The patient suspects a cyst in her left breast, which occasionally increases in size.    The patient has noticed a mole on the back of her leg.    Supplemental Information  She gets her eyes examined on 05/10/2024 and gets them every year. They have not changed any in the last 10 years.   Her maternal grandmother and aunt had breast cancer. She does not remember anybody on her father's side  "who had ovarian cancer.         Objective   Vital Signs:   Vitals:    04/22/24 0939   BP: 118/66   BP Location: Left arm   Patient Position: Sitting   Cuff Size: Large Adult   Pulse: 63   Resp: 20   Temp: 97.4 °F (36.3 °C)   TempSrc: Temporal   SpO2: 96%   Weight: 106 kg (232 lb 9.6 oz)   Height: 170.2 cm (67.01\")     Body mass index is 36.42 kg/m².    Wt Readings from Last 3 Encounters:   04/22/24 106 kg (232 lb 9.6 oz)   02/04/24 105 kg (230 lb 12.8 oz)   12/20/23 110 kg (243 lb)     BP Readings from Last 3 Encounters:   04/22/24 118/66   02/04/24 142/68   12/20/23 132/78       Health Maintenance   Topic Date Due    HEMOGLOBIN A1C  02/01/2024    DIABETIC EYE EXAM  05/01/2024    COVID-19 Vaccine (4 - 2023-24 season) 04/24/2024 (Originally 9/1/2023)    RSV Vaccine - Adults (1 - 1-dose 60+ series) 04/22/2025 (Originally 7/31/2017)    BMI FOLLOWUP  05/12/2024    INFLUENZA VACCINE  08/01/2024    DIABETIC FOOT EXAM  08/01/2024    LIPID PANEL  08/01/2024    URINE MICROALBUMIN  08/01/2024    MAMMOGRAM  11/23/2024    ANNUAL WELLNESS VISIT  12/20/2024    DXA SCAN  01/16/2025    TDAP/TD VACCINES (2 - Td or Tdap) 08/01/2026    COLORECTAL CANCER SCREENING  11/08/2027    HEPATITIS C SCREENING  Completed    Pneumococcal Vaccine 65+  Completed    ZOSTER VACCINE  Completed       Physical Exam  Vitals reviewed.   Constitutional:       Appearance: Normal appearance. She is well-developed. She is obese.   HENT:      Head: Normocephalic and atraumatic.      Right Ear: External ear normal.      Left Ear: External ear normal.   Eyes:      Conjunctiva/sclera: Conjunctivae normal.      Pupils: Pupils are equal, round, and reactive to light.   Cardiovascular:      Rate and Rhythm: Normal rate and regular rhythm.      Heart sounds: No murmur heard.     No friction rub. No gallop.      Comments: Lower extremity edema    Pulmonary:      Effort: Pulmonary effort is normal.      Breath sounds: Normal breath sounds. No wheezing or rhonchi. "   Skin:     General: Skin is warm and dry.   Neurological:      Mental Status: She is alert and oriented to person, place, and time.   Psychiatric:         Mood and Affect: Affect normal.         Behavior: Behavior normal.         Thought Content: Thought content normal.        Physical Exam        Result Review :  The following data was reviewed by: Lisa Jones MD on 04/22/2024:         Results  Imaging  Mammogram was normal.            Procedures            Assessment & Plan  Lower extremity edema    Type 2 diabetes mellitus without complication, without long-term current use of insulin    Essential hypertension    Mixed hyperlipidemia     Vitamin D deficiency    Breast lesion    Skin lesion      Orders Placed This Encounter   Procedures    US Breast Left Limited    Mammo Diagnostic Digital Tomosynthesis Left With CAD    Comprehensive Metabolic Panel    Lipid Panel    TSH    MicroAlbumin, Urine, Random - Urine, Clean Catch    Hemoglobin A1c    CBC & Differential             Assessment & Plan  1. Diabetes Mellitus.  The patient has experienced a significant weight loss of approximately 10 pounds since her last visit. The patient will maintain her current regimen of Mounjaro. Should her symptoms exacerbate, or if she experiences severe nausea or frequent vomiting, the dosage may be too potent.    2. Lower extremity edema.   She has been advised to continue elevating her legs, walking, and using compression stockings.    3. Hypertension.  The patient's blood pressure is well-regulated. The patient will continue to monitor her blood pressure at home. Should she experience dizziness or lightheadedness, especially if she continues to lose weight, we may need to consider reducing her Benicar dosage.    4. Skin lesion  Likely seb derm  The patient has been advised to monitor the ABCDs.    Follow-up  The patient is scheduled for a follow-up visit in 3 months.    Patient or patient representative verbalized consent  for the use of Ambient Listening during the visit with  Lisa Jones MD for chart documentation. 4/22/2024  10:23 EDT      FOLLOW UP  Return in about 3 months (around 7/22/2024).  Patient was given instructions and counseling regarding her condition or for health maintenance advice. Please see specific information pulled into the AVS if appropriate.     Lisa Jones MD  04/22/24  10:34 EDT    CURRENT & DISCONTINUED MEDICATIONS  Current Outpatient Medications   Medication Instructions    albuterol sulfate  (90 Base) MCG/ACT inhaler 2 puffs, Inhalation, Every 4 Hours PRN    Allergy Relief 5 mg, Oral, Daily    azelastine (ASTELIN) 0.1 % nasal spray SPRAY ONE TO TWO SPRAYS IN each nostril TWICE DAILY    dicyclomine (BENTYL) 10 mg, Oral, 4 Times Daily Before Meals & Nightly    EPINEPHrine (EPIPEN) 0.3 MG/0.3ML solution auto-injector injection Use as directed for acute allergic reaction.    fenofibrate (TRICOR) 145 mg, Oral, Daily    fluticasone (FLONASE) 50 MCG/ACT nasal spray SPRAY TWO SPRAY IN each nostril EVERY DAY    furosemide (LASIX) 20 mg, Oral, Daily    montelukast (SINGULAIR) 10 mg, Oral, Nightly    olmesartan-hydrochlorothiazide (BENICAR HCT) 20-12.5 MG per tablet 1 tablet, Oral, Daily    pantoprazole (PROTONIX) 40 mg, Oral, Daily    Pataday 0.7 % solution 1 drop, Both Eyes, Daily PRN, NOT USED IN A WHILE    potassium chloride (MICRO-K) 10 MEQ CR capsule 10 mEq, Oral, 2 Times Daily    Tirzepatide (MOUNJARO) 5 mg, Subcutaneous, Weekly    VITAMIN D, ERGOCALCIFEROL, PO Oral, Daily       Medications Discontinued During This Encounter   Medication Reason    guaifenesin-dextromethorphan (MUCINEX DM)  MG tablet sustained-release 12 hour tablet

## 2024-04-25 ENCOUNTER — TELEPHONE (OUTPATIENT)
Dept: INTERNAL MEDICINE | Facility: CLINIC | Age: 69
End: 2024-04-25
Payer: COMMERCIAL

## 2024-04-25 NOTE — TELEPHONE ENCOUNTER
Caller: India Gaytan    Relationship to patient: Self    Best call back number: [phone]     Patient is needing: PATIENT STATES SHE IS UNABLE TO GET HER MOUNJARO SHE IS UNABLE TO FIND A PHARMACY WITH THE 5MG IN STOCK. PATIENT IS ASKING IF THERE IS ANY OTHER MEDICATION THAT WOULD WORK FOR HER. PLEASE CALL TO ADVISE

## 2024-04-29 RX ORDER — TIRZEPATIDE 5 MG/.5ML
INJECTION, SOLUTION SUBCUTANEOUS
Qty: 2 ML | Refills: 2 | Status: SHIPPED | OUTPATIENT
Start: 2024-04-29

## 2024-04-30 ENCOUNTER — TELEPHONE (OUTPATIENT)
Dept: INTERNAL MEDICINE | Facility: CLINIC | Age: 69
End: 2024-04-30
Payer: COMMERCIAL

## 2024-04-30 DIAGNOSIS — R92.8 ABNORMAL MAMMOGRAM: Primary | ICD-10-CM

## 2024-04-30 NOTE — TELEPHONE ENCOUNTER
We could either decrease the dose if the pharmacy has a lower dose or switch her to Ozempic.  Totally up to her.

## 2024-04-30 NOTE — TELEPHONE ENCOUNTER
Received call from patient stating that she had been working with Agata in reference to her Monjaro and not being able to locate any as well as possibly changing to Ozempic and not being able to locate that medication either.  She asked if we could have Agata return her call tomorrow.  Told her I would send her message that she had called.  Messaged Agata for return call to patient.

## 2024-05-01 RX ORDER — SEMAGLUTIDE 1.34 MG/ML
0.25 INJECTION, SOLUTION SUBCUTANEOUS WEEKLY
Qty: 3 ML | Refills: 0 | COMMUNITY
Start: 2024-05-01

## 2024-05-01 RX ORDER — SEMAGLUTIDE 1.34 MG/ML
0.5 INJECTION, SOLUTION SUBCUTANEOUS WEEKLY
Qty: 3 ML | Refills: 0 | COMMUNITY
Start: 2024-05-01

## 2024-05-01 NOTE — TELEPHONE ENCOUNTER
Called and spoke with pt, pt stated she is willing to try the Ozempic, 2 sample boxes given to pt.

## 2024-05-08 PROBLEM — J20.9 ACUTE BRONCHITIS: Status: ACTIVE | Noted: 2024-05-08

## 2024-05-08 PROBLEM — J30.9 ALLERGIC RHINITIS: Status: ACTIVE | Noted: 2024-05-08

## 2024-05-10 ENCOUNTER — PATIENT ROUNDING (BHMG ONLY) (OUTPATIENT)
Dept: URGENT CARE | Facility: CLINIC | Age: 69
End: 2024-05-10
Payer: COMMERCIAL

## 2024-05-10 ENCOUNTER — CLINICAL SUPPORT (OUTPATIENT)
Dept: INTERNAL MEDICINE | Facility: CLINIC | Age: 69
End: 2024-05-10
Payer: COMMERCIAL

## 2024-05-10 DIAGNOSIS — J30.89 ENVIRONMENTAL AND SEASONAL ALLERGIES: Primary | ICD-10-CM

## 2024-05-10 PROCEDURE — 95117 IMMUNOTHERAPY INJECTIONS: CPT | Performed by: INTERNAL MEDICINE

## 2024-05-11 ENCOUNTER — TELEMEDICINE (OUTPATIENT)
Dept: FAMILY MEDICINE CLINIC | Facility: TELEHEALTH | Age: 69
End: 2024-05-11
Payer: MEDICARE

## 2024-05-11 DIAGNOSIS — J01.00 ACUTE MAXILLARY SINUSITIS, RECURRENCE NOT SPECIFIED: Primary | ICD-10-CM

## 2024-05-11 RX ORDER — BROMPHENIRAMINE MALEATE, PSEUDOEPHEDRINE HYDROCHLORIDE, AND DEXTROMETHORPHAN HYDROBROMIDE 2; 30; 10 MG/5ML; MG/5ML; MG/5ML
10 SYRUP ORAL 4 TIMES DAILY PRN
Qty: 200 ML | Refills: 0 | Status: SHIPPED | OUTPATIENT
Start: 2024-05-11

## 2024-05-11 RX ORDER — AMOXICILLIN AND CLAVULANATE POTASSIUM 875; 125 MG/1; MG/1
1 TABLET, FILM COATED ORAL 2 TIMES DAILY
Qty: 20 TABLET | Refills: 0 | Status: SHIPPED | OUTPATIENT
Start: 2024-05-11 | End: 2024-05-21

## 2024-05-16 ENCOUNTER — HOSPITAL ENCOUNTER (OUTPATIENT)
Dept: MAMMOGRAPHY | Facility: HOSPITAL | Age: 69
Discharge: HOME | End: 2024-05-16
Payer: COMMERCIAL

## 2024-05-16 ENCOUNTER — HOSPITAL ENCOUNTER (OUTPATIENT)
Dept: ULTRASOUND IMAGING | Facility: HOSPITAL | Age: 69
Discharge: HOME | End: 2024-05-16
Payer: COMMERCIAL

## 2024-05-16 DIAGNOSIS — R92.8 ABNORMAL MAMMOGRAM: ICD-10-CM

## 2024-05-16 DIAGNOSIS — L98.9 SKIN LESION: ICD-10-CM

## 2024-05-16 PROCEDURE — G0279 TOMOSYNTHESIS, MAMMO: HCPCS

## 2024-05-16 PROCEDURE — 77066 DX MAMMO INCL CAD BI: CPT

## 2024-05-16 PROCEDURE — 76642 ULTRASOUND BREAST LIMITED: CPT

## 2024-05-30 ENCOUNTER — TELEPHONE (OUTPATIENT)
Dept: INTERNAL MEDICINE | Facility: CLINIC | Age: 69
End: 2024-05-30
Payer: COMMERCIAL

## 2024-05-30 NOTE — TELEPHONE ENCOUNTER
Caller: India Gaytan    Relationship: Self    Best call back number: [phone]     Requested Prescriptions:   Mounjaro 5 MG/0.5ML solution pen-injector pen     Pharmacy where request should be sent: [id number] - [place], [place] - [address] [facility] AT [facility] [address] & [place] - [phone] [facility] [phone] FX     Last office visit with prescribing clinician: 4/22/2024   Last telemedicine visit with prescribing clinician: Visit date not found   Next office visit with prescribing clinician: 7/31/2024     Does the patient have less than a 3 day supply:  [x] Yes  [] No    Would you like a call back once the refill request has been completed: [x] Yes [] No    If the office needs to give you a call back, can they leave a voicemail: [x] Yes [] No    John Campbell Rep   05/30/24 09:44 EDT

## 2024-05-31 NOTE — TELEPHONE ENCOUNTER
If she has been off of it for awhile I would recommend starting ont he 2.5mg dose.  If she has been on the 2.5mg dose then the 5mg dose is fine.

## 2024-05-31 NOTE — TELEPHONE ENCOUNTER
Agree that I don't want her switching back and forth.  Since she has been on the ozempic I will call in the mounjaro 5mg, but she needs to monitor very closely for nausea and vomiting.  She needs to stay on this dose for at least 4 weeks prior to increasing the dose.

## 2024-05-31 NOTE — TELEPHONE ENCOUNTER
Called and spoke with pt, [facility] explained to pt she is to inject the 0.5 for 4 weeks before increasing the dose and explained to her to watch for nausea and vomiting, pt verbalized understanding and had no further questions at this time.

## 2024-06-04 RX ORDER — TIRZEPATIDE 5 MG/.5ML
INJECTION, SOLUTION SUBCUTANEOUS
Qty: 2 ML | Refills: 2 | OUTPATIENT
Start: 2024-06-04

## 2024-06-04 NOTE — ADDENDUM NOTE
Addended by: PRADEEP DEAN on: 6/4/2024 03:05 PM     Modules accepted: Orders     Bpo Recommendations: 10% was leave on for 2-3 mins then rinse off Topical Antibiotics Recommendations: Clindamycin apply twice daily Detail Level: Detailed

## 2024-06-20 ENCOUNTER — PRIOR AUTHORIZATION (OUTPATIENT)
Dept: INTERNAL MEDICINE | Facility: CLINIC | Age: 69
End: 2024-06-20
Payer: COMMERCIAL

## 2024-06-20 NOTE — TELEPHONE ENCOUNTER
Ozempic (0.25 or 0.5 MG/DOSE) 2MG/3ML pen-injectors    Your PA request has been approved. Additional information will be provided in the approval communication. (Message 1149). Authorization Expiration Date: June 18, 2027.

## 2024-06-21 ENCOUNTER — TELEPHONE (OUTPATIENT)
Dept: INTERNAL MEDICINE | Facility: CLINIC | Age: 69
End: 2024-06-21

## 2024-06-21 ENCOUNTER — CLINICAL SUPPORT (OUTPATIENT)
Dept: INTERNAL MEDICINE | Facility: CLINIC | Age: 69
End: 2024-06-21
Payer: MEDICARE

## 2024-06-21 DIAGNOSIS — J30.9 ALLERGIC RHINITIS, UNSPECIFIED SEASONALITY, UNSPECIFIED TRIGGER: Primary | ICD-10-CM

## 2024-06-21 DIAGNOSIS — I10 ESSENTIAL HYPERTENSION: ICD-10-CM

## 2024-06-21 PROCEDURE — 95117 IMMUNOTHERAPY INJECTIONS: CPT | Performed by: INTERNAL MEDICINE

## 2024-06-21 RX ORDER — FUROSEMIDE 20 MG/1
20 TABLET ORAL DAILY
Qty: 90 TABLET | Refills: 1 | Status: SHIPPED | OUTPATIENT
Start: 2024-06-21

## 2024-06-21 RX ORDER — PANTOPRAZOLE SODIUM 40 MG/1
40 TABLET, DELAYED RELEASE ORAL DAILY
Qty: 90 TABLET | Refills: 1 | Status: SHIPPED | OUTPATIENT
Start: 2024-06-21

## 2024-06-21 RX ORDER — OLMESARTAN MEDOXOMIL AND HYDROCHLOROTHIAZIDE 20/12.5 20; 12.5 MG/1; MG/1
1 TABLET ORAL DAILY
Qty: 90 TABLET | Refills: 1 | Status: SHIPPED | OUTPATIENT
Start: 2024-06-21

## 2024-06-21 RX ORDER — FENOFIBRATE 145 MG/1
145 TABLET, COATED ORAL DAILY
Qty: 90 TABLET | Refills: 1 | Status: SHIPPED | OUTPATIENT
Start: 2024-06-21

## 2024-06-21 RX ORDER — POTASSIUM CHLORIDE 750 MG/1
10 CAPSULE, EXTENDED RELEASE ORAL 2 TIMES DAILY
Qty: 180 CAPSULE | Refills: 1 | Status: SHIPPED | OUTPATIENT
Start: 2024-06-21

## 2024-06-21 NOTE — TELEPHONE ENCOUNTER
Caller: Idnia Gaytan    Relationship: Self    Best call back number: [phone]     Requested Prescriptions:   COTCHLORIDE  PANTOPREZOLE 40 MG  FUROSIMIDE 20 MG   FENOFIBRATE 145 MG  BENICAR HCT 20-12.5 MG       Pharmacy where request should be sent: [facility] - [place], [place] - [address] [facility] - [phone]  - [phone] FX     Last office visit with prescribing clinician: 4/22/2024   Last telemedicine visit with prescribing clinician: Visit date not found   Next office visit with prescribing clinician: 7/31/2024     Additional details provided by patient: CALLER STATED THAT SHE IS OUT OF MEDICATIONS    Does the patient have less than a 3 day supply:  [x] Yes  [] No      John Wise Rep   06/21/24 15:15 EDT

## 2024-06-21 NOTE — PROGRESS NOTES
Patient came into office this afternoon for administration of allergy injections x 2.  Tolerated well; left immediately following.  No 15 min OBS.    Jo Bhandari RN BSN  [facility], [facility]

## 2024-07-23 ENCOUNTER — OFFICE VISIT (OUTPATIENT)
Dept: INTERNAL MEDICINE | Facility: CLINIC | Age: 69
End: 2024-07-23
Payer: MEDICARE

## 2024-07-23 VITALS
SYSTOLIC BLOOD PRESSURE: 100 MMHG | DIASTOLIC BLOOD PRESSURE: 60 MMHG | HEART RATE: 58 BPM | TEMPERATURE: 97.1 F | WEIGHT: 233 LBS | OXYGEN SATURATION: 99 % | HEIGHT: 67 IN | BODY MASS INDEX: 36.57 KG/M2

## 2024-07-23 DIAGNOSIS — L72.3 INFECTED SEBACEOUS CYST OF SKIN: Primary | ICD-10-CM

## 2024-07-23 DIAGNOSIS — L08.9 INFECTED SEBACEOUS CYST OF SKIN: Primary | ICD-10-CM

## 2024-07-23 PROCEDURE — 1126F AMNT PAIN NOTED NONE PRSNT: CPT | Performed by: INTERNAL MEDICINE

## 2024-07-23 PROCEDURE — 3074F SYST BP LT 130 MM HG: CPT | Performed by: INTERNAL MEDICINE

## 2024-07-23 PROCEDURE — 99213 OFFICE O/P EST LOW 20 MIN: CPT | Performed by: INTERNAL MEDICINE

## 2024-07-23 PROCEDURE — 3044F HG A1C LEVEL LT 7.0%: CPT | Performed by: INTERNAL MEDICINE

## 2024-07-23 PROCEDURE — 3078F DIAST BP <80 MM HG: CPT | Performed by: INTERNAL MEDICINE

## 2024-07-23 RX ORDER — SULFAMETHOXAZOLE AND TRIMETHOPRIM 800; 160 MG/1; MG/1
1 TABLET ORAL 2 TIMES DAILY
Qty: 10 TABLET | Refills: 0 | Status: SHIPPED | OUTPATIENT
Start: 2024-07-23 | End: 2024-07-31 | Stop reason: SDUPTHER

## 2024-07-23 RX ORDER — ERYTHROMYCIN 5 MG/G
OINTMENT OPHTHALMIC
COMMUNITY
Start: 2024-07-16

## 2024-07-23 RX ORDER — ONDANSETRON 4 MG/1
4 TABLET, FILM COATED ORAL EVERY 8 HOURS PRN
Qty: 30 TABLET | Refills: 0 | Status: SHIPPED | OUTPATIENT
Start: 2024-07-23

## 2024-07-23 NOTE — PROGRESS NOTES
"Chief Complaint  Breast Problem (Cyst on breast starting to swell and hurts )      Subjective      History of Present Illness  The patient presents for evaluation of a cyst on her breast.    A cyst was discovered on her breast, and she is uncertain if it appears to be infected. A previous cyst was removed, but it has since recurred. She has undergone an MRI and a mammogram. The cyst became red a week ago, and the bump has been present for some time. She reported feeling cold last night and was unable to warm up. Upon waking this morning, the redness had spread further, and it is painful to touch. Approximately 3 to 4 weeks ago, she was prescribed steroids, which led to a discharge from the cyst. The cyst was not red or bruised at that time, but it has since grown red.         Objective   Vital Signs:   Vitals:    07/23/24 1159   BP: 100/60   BP Location: Right arm   Patient Position: Sitting   Cuff Size: Large Adult   Pulse: 58   Temp: 97.1 °F (36.2 °C)   SpO2: 99%   Weight: 106 kg (233 lb)   Height: 170.2 cm (67\")     Body mass index is 36.49 kg/m².    Wt Readings from Last 3 Encounters:   07/31/24 105 kg (231 lb 6.4 oz)   07/23/24 106 kg (233 lb)   07/03/24 106 kg (233 lb)     BP Readings from Last 3 Encounters:   07/31/24 102/58   07/23/24 100/60   07/03/24 136/74       Health Maintenance   Topic Date Due    BMI FOLLOWUP  05/12/2024    HEMOGLOBIN A1C  10/22/2024    COVID-19 Vaccine (4 - 2023-24 season) 09/01/2024 (Originally 9/1/2023)    INFLUENZA VACCINE  08/01/2024    DIABETIC FOOT EXAM  08/01/2024    ANNUAL WELLNESS VISIT  12/20/2024    DXA SCAN  01/16/2025    LIPID PANEL  04/22/2025    URINE MICROALBUMIN  04/22/2025    DIABETIC EYE EXAM  05/01/2025    MAMMOGRAM  05/16/2026    TDAP/TD VACCINES (2 - Td or Tdap) 08/01/2026    COLORECTAL CANCER SCREENING  11/08/2027    HEPATITIS C SCREENING  Completed    Pneumococcal Vaccine 65+  Completed    ZOSTER VACCINE  Completed       Physical Exam  Vitals reviewed. "   Constitutional:       Appearance: Normal appearance. She is well-developed.   HENT:      Head: Normocephalic and atraumatic.      Right Ear: External ear normal.      Left Ear: External ear normal.   Eyes:      Conjunctiva/sclera: Conjunctivae normal.      Pupils: Pupils are equal, round, and reactive to light.   Cardiovascular:      Rate and Rhythm: Normal rate and regular rhythm.      Heart sounds: No murmur heard.     No friction rub. No gallop.   Pulmonary:      Effort: Pulmonary effort is normal.      Breath sounds: Normal breath sounds. No wheezing or rhonchi.   Chest:          Comments: Left chest area with 1 cm area of firmness with surrounding redness  Skin:     General: Skin is warm and dry.   Neurological:      Mental Status: She is alert and oriented to person, place, and time.   Psychiatric:         Mood and Affect: Affect normal.         Behavior: Behavior normal.         Thought Content: Thought content normal.        Physical Exam        Result Review :  The following data was reviewed by: Lisa Jones MD on 07/23/2024:         Results             Procedures            Assessment & Plan  Infected sebaceous cyst of skin    The cyst appears to be infected. Surgical removal is typically recommendedat this point. A prescription for Bactrim, to be taken twice daily for 10 days, has been provided. A referral to General Surgery has been made. Should she develop severe fever, feel unwell, or experience a decrease in blood pressure, she should seek immediate medical attention at the emergency room.    Orders Placed This Encounter   Procedures    Ambulatory Referral to General Surgery     New Medications Ordered This Visit   Medications    sulfamethoxazole-trimethoprim (Bactrim DS) 800-160 MG per tablet     Sig: Take 1 tablet by mouth 2 (Two) Times a Day.     Dispense:  10 tablet     Refill:  0    ondansetron (Zofran) 4 MG tablet     Sig: Take 1 tablet by mouth Every 8 (Eight) Hours As Needed for  Nausea or Vomiting.     Dispense:  30 tablet     Refill:  0          Assessment & Plan      Patient or patient representative verbalized consent for the use of Ambient Listening during the visit with  Lisa Jones MD for chart documentation. 7/31/2024  09:33 EDT      FOLLOW UP  No follow-ups on file.  Patient was given instructions and counseling regarding her condition or for health maintenance advice. Please see specific information pulled into the AVS if appropriate.     Lisa Jones MD  07/31/24  09:33 EDT    CURRENT & DISCONTINUED MEDICATIONS  Current Outpatient Medications   Medication Instructions    albuterol sulfate  (90 Base) MCG/ACT inhaler 2 puffs, Inhalation, Every 4 Hours PRN    Allergy Relief 5 mg, Oral, Daily    azelastine (ASTELIN) 0.1 % nasal spray SPRAY ONE TO TWO SPRAYS IN each nostril TWICE DAILY    colchicine 0.6 MG tablet Take 2 tablets with 8 ounces of water.  Then take 1 tablet 1 hour later with 8 ounces of water.    dicyclomine (BENTYL) 10 mg, Oral, 4 Times Daily Before Meals & Nightly    EPINEPHrine (EPIPEN) 0.3 MG/0.3ML solution auto-injector injection Use as directed for acute allergic reaction.    erythromycin (ROMYCIN) 5 MG/GM ophthalmic ointment     fenofibrate (TRICOR) 145 mg, Oral, Daily    fluticasone (FLONASE) 50 MCG/ACT nasal spray SPRAY TWO SPRAY IN each nostril EVERY DAY    furosemide (LASIX) 20 mg, Oral, Daily    indomethacin (INDOCIN) 25 mg, Oral, 3 Times Daily PRN    montelukast (SINGULAIR) 10 mg, Oral, Nightly    olmesartan-hydrochlorothiazide (BENICAR HCT) 20-12.5 MG per tablet 1 tablet, Oral, Daily    ondansetron (ZOFRAN) 4 mg, Oral, Every 8 Hours PRN    pantoprazole (PROTONIX) 40 mg, Oral, Daily    Pataday 0.7 % solution 1 drop, Both Eyes, Daily PRN, NOT USED IN A WHILE    potassium chloride (MICRO-K) 10 MEQ CR capsule 10 mEq, Oral, 2 Times Daily    sulfamethoxazole-trimethoprim (Bactrim DS) 800-160 MG per tablet 1 tablet, Oral, 2 Times Daily     Tirzepatide (MOUNJARO) 5 mg, Subcutaneous, Weekly    VITAMIN D, ERGOCALCIFEROL, PO Oral, Daily       There are no discontinued medications.

## 2024-07-29 ENCOUNTER — TELEPHONE (OUTPATIENT)
Dept: INTERNAL MEDICINE | Facility: CLINIC | Age: 69
End: 2024-07-29
Payer: COMMERCIAL

## 2024-07-29 NOTE — TELEPHONE ENCOUNTER
Caller: India Gaytan    Relationship: Self    Best call back number: [phone]    What medication are you requesting: STRONGER ANTIBIOTIC     What are your current symptoms: BREAST INFECTION/RASH     How long have you been experiencing symptoms: A FEW WEEKS     Have you had these symptoms before:    [x] Yes  [] No    Have you been treated for these symptoms before:   [x] Yes  [] No    If a prescription is needed, what is your preferred pharmacy and phone number: [facility] - [place], [place] - [address] [facility] [phone] [facility] [phone] FX     Additional notes: PATIENT STATES SHE DID FINISH TAKING THE BACTRIM MD DEAN HAD PRESCRIBED HER BUT IT DID NOT HELP WITH THE INFECTION/RASH. PATIENT STATES IT IS NOW SPREADING TO HER OTHER BREAST.

## 2024-07-30 NOTE — TELEPHONE ENCOUNTER
I need her to see someone in the surgery department ASAP please.  I had placed an order for [place] as she has seen him in the past.

## 2024-07-31 ENCOUNTER — OFFICE VISIT (OUTPATIENT)
Dept: INTERNAL MEDICINE | Facility: CLINIC | Age: 69
End: 2024-07-31
Payer: MEDICARE

## 2024-07-31 VITALS
SYSTOLIC BLOOD PRESSURE: 102 MMHG | DIASTOLIC BLOOD PRESSURE: 58 MMHG | HEART RATE: 69 BPM | RESPIRATION RATE: 20 BRPM | BODY MASS INDEX: 36.32 KG/M2 | HEIGHT: 67 IN | TEMPERATURE: 97.6 F | WEIGHT: 231.4 LBS | OXYGEN SATURATION: 97 %

## 2024-07-31 DIAGNOSIS — E78.2 MIXED HYPERLIPIDEMIA: ICD-10-CM

## 2024-07-31 DIAGNOSIS — I10 ESSENTIAL HYPERTENSION: Primary | ICD-10-CM

## 2024-07-31 DIAGNOSIS — L08.9 INFECTED SEBACEOUS CYST OF SKIN: ICD-10-CM

## 2024-07-31 DIAGNOSIS — M10.9 GOUT, UNSPECIFIED CAUSE, UNSPECIFIED CHRONICITY, UNSPECIFIED SITE: ICD-10-CM

## 2024-07-31 DIAGNOSIS — E55.9 VITAMIN D DEFICIENCY: ICD-10-CM

## 2024-07-31 DIAGNOSIS — E11.9 TYPE 2 DIABETES MELLITUS WITHOUT COMPLICATION, WITHOUT LONG-TERM CURRENT USE OF INSULIN: ICD-10-CM

## 2024-07-31 DIAGNOSIS — L72.3 INFECTED SEBACEOUS CYST OF SKIN: ICD-10-CM

## 2024-07-31 LAB
25(OH)D3 SERPL-MCNC: 49.7 NG/ML (ref 30–100)
ALBUMIN SERPL-MCNC: 4.3 G/DL (ref 3.5–5.2)
ALBUMIN UR-MCNC: <1.2 MG/DL
ALBUMIN/GLOB SERPL: 1.4 G/DL
ALP SERPL-CCNC: 86 U/L (ref 39–117)
ALT SERPL W P-5'-P-CCNC: 18 U/L (ref 1–33)
ANION GAP SERPL CALCULATED.3IONS-SCNC: 10 MMOL/L (ref 5–15)
AST SERPL-CCNC: 20 U/L (ref 1–32)
BASOPHILS # BLD AUTO: 0.04 10*3/MM3 (ref 0–0.2)
BASOPHILS NFR BLD AUTO: 0.7 % (ref 0–1.5)
BILIRUB SERPL-MCNC: <0.2 MG/DL (ref 0–1.2)
BUN SERPL-MCNC: 53 MG/DL (ref 8–23)
BUN/CREAT SERPL: 24.5 (ref 7–25)
CALCIUM SPEC-SCNC: 10.2 MG/DL (ref 8.6–10.5)
CHLORIDE SERPL-SCNC: 102 MMOL/L (ref 98–107)
CHOLEST SERPL-MCNC: 127 MG/DL (ref 0–200)
CO2 SERPL-SCNC: 24 MMOL/L (ref 22–29)
CREAT SERPL-MCNC: 2.16 MG/DL (ref 0.57–1)
DEPRECATED RDW RBC AUTO: 44.3 FL (ref 37–54)
EGFRCR SERPLBLD CKD-EPI 2021: 24.6 ML/MIN/1.73
EOSINOPHIL # BLD AUTO: 0.27 10*3/MM3 (ref 0–0.4)
EOSINOPHIL NFR BLD AUTO: 4.8 % (ref 0.3–6.2)
ERYTHROCYTE [DISTWIDTH] IN BLOOD BY AUTOMATED COUNT: 13.8 % (ref 12.3–15.4)
GLOBULIN UR ELPH-MCNC: 3 GM/DL
GLUCOSE SERPL-MCNC: 80 MG/DL (ref 65–99)
HBA1C MFR BLD: 6.1 % (ref 4.8–5.6)
HCT VFR BLD AUTO: 38.5 % (ref 34–46.6)
HDLC SERPL-MCNC: 39 MG/DL (ref 40–60)
HGB BLD-MCNC: 12.2 G/DL (ref 12–15.9)
IMM GRANULOCYTES # BLD AUTO: 0.04 10*3/MM3 (ref 0–0.05)
IMM GRANULOCYTES NFR BLD AUTO: 0.7 % (ref 0–0.5)
LDLC SERPL CALC-MCNC: 60 MG/DL (ref 0–100)
LDLC/HDLC SERPL: 1.41 {RATIO}
LYMPHOCYTES # BLD AUTO: 2.05 10*3/MM3 (ref 0.7–3.1)
LYMPHOCYTES NFR BLD AUTO: 36.4 % (ref 19.6–45.3)
MCH RBC QN AUTO: 27.7 PG (ref 26.6–33)
MCHC RBC AUTO-ENTMCNC: 31.7 G/DL (ref 31.5–35.7)
MCV RBC AUTO: 87.5 FL (ref 79–97)
MONOCYTES # BLD AUTO: 0.53 10*3/MM3 (ref 0.1–0.9)
MONOCYTES NFR BLD AUTO: 9.4 % (ref 5–12)
NEUTROPHILS NFR BLD AUTO: 2.7 10*3/MM3 (ref 1.7–7)
NEUTROPHILS NFR BLD AUTO: 48 % (ref 42.7–76)
NRBC BLD AUTO-RTO: 0 /100 WBC (ref 0–0.2)
PLATELET # BLD AUTO: 464 10*3/MM3 (ref 140–450)
PMV BLD AUTO: 10.3 FL (ref 6–12)
POTASSIUM SERPL-SCNC: 4.8 MMOL/L (ref 3.5–5.2)
PROT SERPL-MCNC: 7.3 G/DL (ref 6–8.5)
RBC # BLD AUTO: 4.4 10*6/MM3 (ref 3.77–5.28)
SODIUM SERPL-SCNC: 136 MMOL/L (ref 136–145)
TRIGL SERPL-MCNC: 166 MG/DL (ref 0–150)
URATE SERPL-MCNC: 11.4 MG/DL (ref 2.4–5.7)
VLDLC SERPL-MCNC: 28 MG/DL (ref 5–40)
WBC NRBC COR # BLD AUTO: 5.63 10*3/MM3 (ref 3.4–10.8)

## 2024-07-31 PROCEDURE — 83036 HEMOGLOBIN GLYCOSYLATED A1C: CPT | Performed by: INTERNAL MEDICINE

## 2024-07-31 PROCEDURE — 84550 ASSAY OF BLOOD/URIC ACID: CPT | Performed by: INTERNAL MEDICINE

## 2024-07-31 PROCEDURE — 80061 LIPID PANEL: CPT | Performed by: INTERNAL MEDICINE

## 2024-07-31 PROCEDURE — 80053 COMPREHEN METABOLIC PANEL: CPT | Performed by: INTERNAL MEDICINE

## 2024-07-31 PROCEDURE — 82043 UR ALBUMIN QUANTITATIVE: CPT | Performed by: INTERNAL MEDICINE

## 2024-07-31 PROCEDURE — 85025 COMPLETE CBC W/AUTO DIFF WBC: CPT | Performed by: INTERNAL MEDICINE

## 2024-07-31 PROCEDURE — 82306 VITAMIN D 25 HYDROXY: CPT | Performed by: INTERNAL MEDICINE

## 2024-07-31 RX ORDER — SULFAMETHOXAZOLE AND TRIMETHOPRIM 800; 160 MG/1; MG/1
1 TABLET ORAL 2 TIMES DAILY
Qty: 10 TABLET | Refills: 0 | Status: SHIPPED | OUTPATIENT
Start: 2024-07-31 | End: 2024-08-01

## 2024-07-31 NOTE — PROGRESS NOTES
Patient came into office this afternoon for administration of allergy injections x 2; see eMAR for details.  Tolerated well; left immediately following.  No 15 min OBS.

## 2024-07-31 NOTE — PROGRESS NOTES
"Chief Complaint  Diabetes (3 mo fu ), Hypertension, Hyperlipidemia, Vitamin D Deficiency, and Cyst (Left breast )      Subjective      History of Present Illness  The patient presents for evaluation of a sebaceous cyst.    She reports a worsening of the sebaceous cyst, which has grown in size, round, and darkened over time. This area occasionally causes pain. She has ceased wearing bras due to the discomfort. Despite taking Bactrim, she has not noticed any improvement. She expresses concern about the appearance of the cyst on her back, as it has recently appeared. She is concerned about the potential for cysts to spread post-incision, given her concerns about potential breast cancer associated with breast surgery.    She has been taking Mounjaro for the past 5 weeks, which she tolerates well. She usually experiences sickness for 1 to 2 days, but this time she did not. She experienced vomiting and diarrhea once, and has been constipated this time. She has been taking MiraLAX for constipation. She denies experiencing any chest pain or breathing difficulties.    She denies any recent gout flare-ups. She is currently taking fenofibrate for cholesterol management and Lasix once daily. She notes that she does not experience swelling as she did when she was on the bus in the summer. She wears compression stockings in the winter.         Objective   Vital Signs:   Vitals:    07/31/24 0833   BP: 102/58   BP Location: Left arm   Patient Position: Sitting   Cuff Size: Large Adult   Pulse: 69   Resp: 20   Temp: 97.6 °F (36.4 °C)   TempSrc: Temporal   SpO2: 97%   Weight: 105 kg (231 lb 6.4 oz)   Height: 170.2 cm (67.01\")     Body mass index is 36.23 kg/m².    Wt Readings from Last 3 Encounters:   07/31/24 105 kg (231 lb 6.4 oz)   07/23/24 106 kg (233 lb)   07/03/24 106 kg (233 lb)     BP Readings from Last 3 Encounters:   07/31/24 102/58   07/23/24 100/60   07/03/24 136/74       Health Maintenance   Topic Date Due    HEMOGLOBIN " A1C  10/22/2024    COVID-19 Vaccine (4 - 2023-24 season) 09/01/2024 (Originally 9/1/2023)    INFLUENZA VACCINE  08/01/2024    DIABETIC FOOT EXAM  08/01/2024    ANNUAL WELLNESS VISIT  12/20/2024    DXA SCAN  01/16/2025    LIPID PANEL  04/22/2025    URINE MICROALBUMIN  04/22/2025    DIABETIC EYE EXAM  05/01/2025    BMI FOLLOWUP  07/31/2025    MAMMOGRAM  05/16/2026    TDAP/TD VACCINES (2 - Td or Tdap) 08/01/2026    COLORECTAL CANCER SCREENING  11/08/2027    HEPATITIS C SCREENING  Completed    Pneumococcal Vaccine 65+  Completed    ZOSTER VACCINE  Completed       Physical Exam  Vitals reviewed.   Constitutional:       Appearance: Normal appearance. She is well-developed.   HENT:      Head: Normocephalic and atraumatic.      Right Ear: External ear normal.      Left Ear: External ear normal.   Eyes:      Conjunctiva/sclera: Conjunctivae normal.      Pupils: Pupils are equal, round, and reactive to light.   Cardiovascular:      Rate and Rhythm: Normal rate and regular rhythm.      Heart sounds: No murmur heard.     No friction rub. No gallop.   Pulmonary:      Effort: Pulmonary effort is normal.      Breath sounds: Normal breath sounds. No wheezing or rhonchi.   Skin:     General: Skin is warm and dry.   Neurological:      Mental Status: She is alert and oriented to person, place, and time.   Psychiatric:         Mood and Affect: Affect normal.         Behavior: Behavior normal.         Thought Content: Thought content normal.        Physical Exam        Result Review :  The following data was reviewed by: Lisa Jones MD on 07/31/2024:         Results          Procedures            Assessment & Plan  Essential hypertension   doing well cont to monitor  Type 2 diabetes mellitus without complication, without long-term current use of insulin  doing well, cont current meds for now  Infected sebaceous cyst of skin  Based on her imaging results, an infected sebaceous cyst is suspected. Drainage and removal are necessary  as the next step. I will attempt to expedite her appointment with surgery. I will give her more bactrim.   Vitamin D deficiency  Check level and adjust as needed  Mixed hyperlipidemia   will check labs and adjust meds as needed  Gout, unspecified cause, unspecified chronicity, unspecified site  Uric acid will be included in her blood work.     Orders Placed This Encounter   Procedures    Comprehensive Metabolic Panel    Lipid Panel    MicroAlbumin, Urine, Random - Urine, Clean Catch    Hemoglobin A1c    Vitamin D,25-Hydroxy    Uric acid    CBC Auto Differential    CBC & Differential     New Medications Ordered This Visit   Medications    sulfamethoxazole-trimethoprim (Bactrim DS) 800-160 MG per tablet     Sig: Take 1 tablet by mouth 2 (Two) Times a Day.     Dispense:  10 tablet     Refill:  0          Assessment & Plan    Blood work for diabetes, cholesterol, potassium, electrolytes, anemia level, protein level in urine, A1c, uric acid, and vitamin D level will be checked.     Patient or patient representative verbalized consent for the use of Ambient Listening during the visit with  Lisa Jones MD for chart documentation. 7/31/2024  09:36 EDT      FOLLOW UP  No follow-ups on file.  Patient was given instructions and counseling regarding her condition or for health maintenance advice. Please see specific information pulled into the AVS if appropriate.     Lisa Jones MD  07/31/24  11:50 EDT    CURRENT & DISCONTINUED MEDICATIONS  Current Outpatient Medications   Medication Instructions    albuterol sulfate  (90 Base) MCG/ACT inhaler 2 puffs, Inhalation, Every 4 Hours PRN    Allergy Relief 5 mg, Oral, Daily    azelastine (ASTELIN) 0.1 % nasal spray SPRAY ONE TO TWO SPRAYS IN each nostril TWICE DAILY    dicyclomine (BENTYL) 10 mg, Oral, 4 Times Daily Before Meals & Nightly    EPINEPHrine (EPIPEN) 0.3 MG/0.3ML solution auto-injector injection Use as directed for acute allergic reaction.     erythromycin (ROMYCIN) 5 MG/GM ophthalmic ointment     fenofibrate (TRICOR) 145 mg, Oral, Daily    fluticasone (FLONASE) 50 MCG/ACT nasal spray SPRAY TWO SPRAY IN each nostril EVERY DAY    furosemide (LASIX) 20 mg, Oral, Daily    indomethacin (INDOCIN) 25 mg, Oral, 3 Times Daily PRN    montelukast (SINGULAIR) 10 mg, Oral, Nightly    olmesartan-hydrochlorothiazide (BENICAR HCT) 20-12.5 MG per tablet 1 tablet, Oral, Daily    ondansetron (ZOFRAN) 4 mg, Oral, Every 8 Hours PRN    pantoprazole (PROTONIX) 40 mg, Oral, Daily    Pataday 0.7 % solution 1 drop, Both Eyes, Daily PRN, NOT USED IN A WHILE    potassium chloride (MICRO-K) 10 MEQ CR capsule 10 mEq, Oral, 2 Times Daily    sulfamethoxazole-trimethoprim (Bactrim DS) 800-160 MG per tablet 1 tablet, Oral, 2 Times Daily    Tirzepatide (MOUNJARO) 5 mg, Subcutaneous, Weekly    VITAMIN D, ERGOCALCIFEROL, PO Oral, Daily       Medications Discontinued During This Encounter   Medication Reason    colchicine 0.6 MG tablet     sulfamethoxazole-trimethoprim (Bactrim DS) 800-160 MG per tablet Reorder

## 2024-08-01 RX ORDER — DOXYCYCLINE HYCLATE 100 MG/1
100 CAPSULE ORAL 2 TIMES DAILY
Qty: 10 CAPSULE | Refills: 0 | Status: SHIPPED | OUTPATIENT
Start: 2024-08-01

## 2024-08-01 RX ORDER — ALLOPURINOL 100 MG/1
100 TABLET ORAL DAILY
Qty: 90 TABLET | Refills: 1 | Status: SHIPPED | OUTPATIENT
Start: 2024-08-01

## 2024-08-07 ENCOUNTER — PREP FOR SURGERY (OUTPATIENT)
Dept: OTHER | Facility: HOSPITAL | Age: 69
End: 2024-08-07
Payer: COMMERCIAL

## 2024-08-07 ENCOUNTER — OFFICE VISIT (OUTPATIENT)
Dept: SURGERY | Facility: CLINIC | Age: 69
End: 2024-08-07
Payer: MEDICARE

## 2024-08-07 VITALS — HEIGHT: 66 IN | BODY MASS INDEX: 36.64 KG/M2 | WEIGHT: 228 LBS

## 2024-08-07 DIAGNOSIS — R22.2 CHEST MASS: Primary | ICD-10-CM

## 2024-08-07 DIAGNOSIS — L72.3 SEBACEOUS CYST: Primary | ICD-10-CM

## 2024-08-07 RX ORDER — SODIUM CHLORIDE 9 MG/ML
40 INJECTION, SOLUTION INTRAVENOUS AS NEEDED
OUTPATIENT
Start: 2024-08-07

## 2024-08-07 RX ORDER — CLINDAMYCIN HYDROCHLORIDE 300 MG/1
300 CAPSULE ORAL 3 TIMES DAILY
Qty: 21 CAPSULE | Refills: 0 | Status: SHIPPED | OUTPATIENT
Start: 2024-08-07

## 2024-08-07 RX ORDER — SODIUM CHLORIDE 0.9 % (FLUSH) 0.9 %
10 SYRINGE (ML) INJECTION AS NEEDED
OUTPATIENT
Start: 2024-08-07

## 2024-08-07 RX ORDER — SODIUM CHLORIDE 0.9 % (FLUSH) 0.9 %
10 SYRINGE (ML) INJECTION EVERY 12 HOURS SCHEDULED
OUTPATIENT
Start: 2024-08-07

## 2024-08-08 NOTE — H&P (VIEW-ONLY)
Chief Complaint: Cyst (Infected cyst on left breast)    Subjective         History of Present Illness      India Gaytan is a 69 y.o. female presents to [facility] GENER[facility] SUR[facility]ERY     History of Present Illness  The patient presents for evaluation of a sebaceous cyst.    She reports that the cyst was significantly large, but by Monday night, it ruptured and has been draining significantly. The cyst is hard and deep within it. She has been applying a Band-Aid to the area. She was previously on Bactrim, but it was discontinued due to other tests. She was also prescribed dicyclomine, but she was hesitant to take it due to potential side effects. The drainage from the cyst is described as red and blood. A mammogram and ultrasound were performed, but she was informed that the cyst was too deep for immediate intervention.    Objective     Past Medical History:   Diagnosis Date    Anemia     ASYMPTOMATIC    Anesthesia     SLOW TO WAKE UP POSTOP     Benign essential hypertension     Chronic allergic rhinitis     Essential hypertension     Gout 05/30/2019    greatly improved    High cholesterol     Hyperlipidemia     Lower extremity edema 09/18/2017    cont compression stockings can do lymphedema therapy if she wishes in the future    Lumbar back pain with radiculopathy affecting right lower extremity 06/12/2015    Mixed hyperlipidemia     Osteoarthritis of right hip 06/12/2015    Pain of right hip joint 09/18/2017    has chronic arthritis of that hip with tendonitits seen previously in MRI however she doesn't wish to do pt at this time, will try stretches that she has done in the past at home currently    PONV (postoperative nausea and vomiting)     Seasonal allergies     Ventral hernia CURRENTLY       Past Surgical History:   Procedure Laterality Date    ABDOMINAL HYSTERECTOMY      BACK SURGERY      COLON RESECTION  2020    DR RAMIRES    COLON SURGERY      RELATED TO ABSCESS    COLONOSCOPY  2006,  2019, 2020    COLONOSCOPY N/A 11/8/2022    Procedure: COLONOSCOPY;  Surgeon: Aly Rangel MD;  Location: [facility] ENDOSCOPY;  Service: Gastroenterology;  Laterality: N/A;  DIVERTICULOSIS, ANASTOMOSIS AT 15CM    ENDOSCOPY N/A 11/8/2022    Procedure: ESOPHAGOGASTRODUODENOSCOPY WITH BX;  Surgeon: Aly Rangel MD;  Location: [facility] ENDOSCOPY;  Service: Gastroenterology;  Laterality: N/A;  HIATAL HERNIA    EXCISION LESION N/A 06/13/2022    Procedure: EXCISION BACK MASS;  Surgeon: Eliseo [facility] MD;  Location: [facility] MAIN OR;  Service: General;  Laterality: N/A;    HERNIA REPAIR  12/2020    VENTRAL HERNIA     HYSTERECTOMY  1996    OOPHORECTOMY  1999    ROTATOR CUFF REPAIR Left 2011    VENTRAL HERNIA REPAIR N/A 08/02/2021    Procedure: VENTRAL HERNIA REPAIR LAPAROSCOPIC WITH DAVINCI ROBOT;  Surgeon: Eliseo Cannon MD;  Location: [facility] MAIN OR;  Service: Robotics - DaVinci;  Laterality: N/A;    VERTEBROPLASTY  08/07/2020    T11         Current Outpatient Medications:     albuterol sulfate  (90 Base) MCG/ACT inhaler, Inhale 2 puffs Every 4 (Four) Hours As Needed for Wheezing or Shortness of Air (USES FOR ALLEGERIES)., Disp: , Rfl:     Allergy Relief 5 MG tablet, Take 1 tablet by mouth Daily., Disp: 90 tablet, Rfl: 1    allopurinol (Zyloprim) 100 MG tablet, Take 1 tablet by mouth Daily., Disp: 90 tablet, Rfl: 1    azelastine (ASTELIN) 0.1 % nasal spray, SPRAY ONE TO TWO SPRAYS IN each nostril TWICE DAILY, Disp: , Rfl:     dicyclomine (BENTYL) 10 MG capsule, Take 1 capsule by mouth 4 (Four) Times a Day Before Meals & at Bedtime., Disp: 30 capsule, Rfl: 1    doxycycline (VIBRAMYCIN) 100 MG capsule, Take 1 capsule by mouth 2 (Two) Times a Day., Disp: 10 capsule, Rfl: 0    EPINEPHrine (EPIPEN) 0.3 MG/0.3ML solution auto-injector injection, Use as directed for acute allergic reaction., Disp: , Rfl:     erythromycin (ROMYCIN) 5 MG/GM ophthalmic ointment, , Disp: , Rfl:     fenofibrate (TRICOR) 145  MG tablet, Take 1 tablet by mouth Daily., Disp: 90 tablet, Rfl: 1    fluticasone (FLONASE) 50 MCG/ACT nasal spray, SPRAY TWO SPRAY IN each nostril EVERY DAY, Disp: , Rfl:     furosemide (LASIX) 20 MG tablet, Take 1 tablet by mouth Daily., Disp: 90 tablet, Rfl: 1    indomethacin (INDOCIN) 25 MG capsule, Take 1 capsule by mouth 3 (Three) Times a Day As Needed for Mild Pain., Disp: 15 capsule, Rfl: 0    montelukast (SINGULAIR) 10 MG tablet, Take 1 tablet by mouth Every Night., Disp: 90 tablet, Rfl: 1    olmesartan-hydrochlorothiazide (BENICAR HCT) 20-12.5 MG per tablet, Take 1 tablet by mouth Daily., Disp: 90 tablet, Rfl: 1    ondansetron (Zofran) 4 MG tablet, Take 1 tablet by mouth Every 8 (Eight) Hours As Needed for Nausea or Vomiting., Disp: 30 tablet, Rfl: 0    pantoprazole (PROTONIX) 40 MG EC tablet, Take 1 tablet by mouth Daily., Disp: 90 tablet, Rfl: 1    Pataday 0.7 % solution, Administer 1 drop to both eyes Daily As Needed. NOT USED IN A WHILE, Disp: , Rfl:     potassium chloride (MICRO-K) 10 MEQ CR capsule, Take 1 capsule by mouth 2 (Two) Times a Day., Disp: 180 capsule, Rfl: 1    Tirzepatide (MOUNJARO) 5 MG/0.5ML solution pen-injector pen, Inject 0.5 mL under the skin into the appropriate area as directed 1 (One) Time Per Week., Disp: 4 mL, Rfl: 1    VITAMIN D, ERGOCALCIFEROL, PO, Take  by mouth Daily., Disp: , Rfl:     clindamycin (Cleocin) 300 MG capsule, Take 1 capsule by mouth 3 (Three) Times a Day., Disp: 21 capsule, Rfl: 0    Current Facility-Administered Medications:     patient supplied allergy injection, 0.5 mL, Subcutaneous, Q28 Days, Lisa Jones MD, 0.5 mL at 07/31/24 1034    patient supplied allergy injection, 0.5 mL, Subcutaneous, Q28 Days, Lisa Jones MD, 0.5 mL at 07/31/24 1033    Allergies   Allergen Reactions    Levofloxacin Nausea And Vomiting     Pt reported also caused her to pass out         Family History   Problem Relation Age of Onset    Heart disease Mother     Heart  disease Father     Colon cancer Neg Hx     Malig Hyperthermia Neg Hx        Social History     Socioeconomic History    Marital status:    Tobacco Use    Smoking status: Never     Passive exposure: Never    Smokeless tobacco: Never   Vaping Use    Vaping status: Never Used   Substance and Sexual Activity    Alcohol use: Yes     Comment: Rarely drinks    Drug use: Never    Sexual activity: Defer        Physical Exam   Physical Exam  Patient is in no acute distress, with nonlabored breathing.  On the medial portion of the left breast, there is a 2 cm lesion, which appears to be a sebaceous cyst. It exhibits some erythema. It is tender to palpation and is mildly infected.        Result Review :            Results           Assessment and Plan    Diagnoses and all orders for this visit:    1. Sebaceous cyst (Primary)    Other orders  -     clindamycin (Cleocin) 300 MG capsule; Take 1 capsule by mouth 3 (Three) Times a Day.  Dispense: 21 capsule; Refill: 0          Assessment & Plan  1. Infected sebaceous cyst in her chest.  We will plan for excision in the operating room. Risks, benefits, alternatives of the procedure were discussed extensively. All questions were answered. Patient voiced understanding and agreed to proceed. In the meantime, I will call her in some clindamycin as she is not taking the antibiotic that was prescribed by her PCP secondary to concern of side effects.      Follow Up   No follow-ups on file.  Patient was given instructions and counseling regarding her condition or for health maintenance advice. Please see specific information pulled into the AVS if appropriate.     Patient or patient representative verbalized consent for the use of Ambient Listening during the visit with  Eliseo Cannon MD for chart documentation. 8/8/2024  15:21 EDT    Eliseo Cannon MD

## 2024-08-08 NOTE — PROGRESS NOTES
Chief Complaint: Cyst (Infected cyst on left breast)    Subjective         History of Present Illness      India Gaytan is a 69 y.o. female presents to [facility] GENERAL SURGERY     History of Present Illness  The patient presents for evaluation of a sebaceous cyst.    She reports that the cyst was significantly large, but by Monday night, it ruptured and has been draining significantly. The cyst is hard and deep within it. She has been applying a Band-Aid to the area. She was previously on Bactrim, but it was discontinued due to other tests. She was also prescribed dicyclomine, but she was hesitant to take it due to potential side effects. The drainage from the cyst is described as red and blood. A mammogram and ultrasound were performed, but she was informed that the cyst was too deep for immediate intervention.    Objective     Past Medical History:   Diagnosis Date    Anemia     ASYMPTOMATIC    Anesthesia     SLOW TO WAKE UP POSTOP     Benign essential hypertension     Chronic allergic rhinitis     Essential hypertension     Gout 05/30/2019    greatly improved    High cholesterol     Hyperlipidemia     Lower extremity edema 09/18/2017    cont compression stockings can do lymphedema therapy if she wishes in the future    Lumbar back pain with radiculopathy affecting right lower extremity 06/12/2015    Mixed hyperlipidemia     Osteoarthritis of right hip 06/12/2015    Pain of right hip joint 09/18/2017    has chronic arthritis of that hip with tendonitits seen previously in MRI however she doesn't wish to do pt at this time, will try stretches that she has done in the past at home currently    PONV (postoperative nausea and vomiting)     Seasonal allergies     Ventral hernia CURRENTLY       Past Surgical History:   Procedure Laterality Date    ABDOMINAL HYSTERECTOMY      BACK SURGERY      COLON RESECTION  2020    DR RAMIRES    COLON SURGERY      RELATED TO ABSCESS    COLONOSCOPY  2006,  2019, 2020    COLONOSCOPY N/A 11/8/2022    Procedure: COLONOSCOPY;  Surgeon: Aly Rangel MD;  Location: [facility] ENDOSCOPY;  Service: Gastroenterology;  Laterality: N/A;  DIVERTICULOSIS, ANASTOMOSIS AT 15CM    ENDOSCOPY N/A 11/8/2022    Procedure: ESOPHAGOGASTRODUODENOSCOPY WITH BX;  Surgeon: Aly Rangel MD;  Location: [facility] ENDOSCOPY;  Service: Gastroenterology;  Laterality: N/A;  HIATAL HERNIA    EXCISION LESION N/A 06/13/2022    Procedure: EXCISION BACK MASS;  Surgeon: Eliseo Cannon MD;  Location: [facility] MAIN OR;  Service: General;  Laterality: N/A;    HERNIA REPAIR  12/2020    VENTRAL HERNIA     HYSTERECTOMY  1996    OOPHORECTOMY  1999    ROTATOR CUFF REPAIR Left 2011    VENTRAL HERNIA REPAIR N/A 08/02/2021    Procedure: VENTRAL HERNIA REPAIR LAPAROSCOPIC WITH DAVINCI ROBOT;  Surgeon: Eliseo Cannon MD;  Location: [facility] MAIN OR;  Service: Robotics - DaVinci;  Laterality: N/A;    VERTEBROPLASTY  08/07/2020    T11         Current Outpatient Medications:     albuterol sulfate  (90 Base) MCG/ACT inhaler, Inhale 2 puffs Every 4 (Four) Hours As Needed for Wheezing or Shortness of Air (USES FOR ALLEGERIES)., Disp: , Rfl:     Allergy Relief 5 MG tablet, Take 1 tablet by mouth Daily., Disp: 90 tablet, Rfl: 1    allopurinol (Zyloprim) 100 MG tablet, Take 1 tablet by mouth Daily., Disp: 90 tablet, Rfl: 1    azelastine (ASTELIN) 0.1 % nasal spray, SPRAY ONE TO TWO SPRAYS IN each nostril TWICE DAILY, Disp: , Rfl:     dicyclomine (BENTYL) 10 MG capsule, Take 1 capsule by mouth 4 (Four) Times a Day Before Meals & at Bedtime., Disp: 30 capsule, Rfl: 1    doxycycline (VIBRAMYCIN) 100 MG capsule, Take 1 capsule by mouth 2 (Two) Times a Day., Disp: 10 capsule, Rfl: 0    EPINEPHrine (EPIPEN) 0.3 MG/0.3ML solution auto-injector injection, Use as directed for acute allergic reaction., Disp: , Rfl:     erythromycin (ROMYCIN) 5 MG/GM ophthalmic ointment, , Disp: , Rfl:     fenofibrate (TRICOR) 145  MG tablet, Take 1 tablet by mouth Daily., Disp: 90 tablet, Rfl: 1    fluticasone (FLONASE) 50 MCG/ACT nasal spray, SPRAY TWO SPRAY IN each nostril EVERY DAY, Disp: , Rfl:     furosemide (LASIX) 20 MG tablet, Take 1 tablet by mouth Daily., Disp: 90 tablet, Rfl: 1    indomethacin (INDOCIN) 25 MG capsule, Take 1 capsule by mouth 3 (Three) Times a Day As Needed for Mild Pain., Disp: 15 capsule, Rfl: 0    montelukast (SINGULAIR) 10 MG tablet, Take 1 tablet by mouth Every Night., Disp: 90 tablet, Rfl: 1    olmesartan-hydrochlorothiazide (BENICAR HCT) 20-12.5 MG per tablet, Take 1 tablet by mouth Daily., Disp: 90 tablet, Rfl: 1    ondansetron (Zofran) 4 MG tablet, Take 1 tablet by mouth Every 8 (Eight) Hours As Needed for Nausea or Vomiting., Disp: 30 tablet, Rfl: 0    pantoprazole (PROTONIX) 40 MG EC tablet, Take 1 tablet by mouth Daily., Disp: 90 tablet, Rfl: 1    Pataday 0.7 % solution, Administer 1 drop to both eyes Daily As Needed. NOT USED IN A WHILE, Disp: , Rfl:     potassium chloride (MICRO-K) 10 MEQ CR capsule, Take 1 capsule by mouth 2 (Two) Times a Day., Disp: 180 capsule, Rfl: 1    Tirzepatide (MOUNJARO) 5 MG/0.5ML solution pen-injector pen, Inject 0.5 mL under the skin into the appropriate area as directed 1 (One) Time Per Week., Disp: 4 mL, Rfl: 1    VITAMIN D, ERGOCALCIFEROL, PO, Take  by mouth Daily., Disp: , Rfl:     clindamycin (Cleocin) 300 MG capsule, Take 1 capsule by mouth 3 (Three) Times a Day., Disp: 21 capsule, Rfl: 0    Current Facility-Administered Medications:     patient supplied allergy injection, 0.5 mL, Subcutaneous, Q28 Days, Lisa Jones MD, 0.5 mL at 07/31/24 1034    patient supplied allergy injection, 0.5 mL, Subcutaneous, Q28 Days, Lisa Jones MD, 0.5 mL at 07/31/24 1033    Allergies   Allergen Reactions    Levofloxacin Nausea And Vomiting     Pt reported also caused her to pass out         Family History   Problem Relation Age of Onset    Heart disease Mother     Heart  disease Father     Colon cancer Neg Hx     Malig Hyperthermia Neg Hx        Social History     Socioeconomic History    Marital status:    Tobacco Use    Smoking status: Never     Passive exposure: Never    Smokeless tobacco: Never   Vaping Use    Vaping status: Never Used   Substance and Sexual Activity    Alcohol use: Yes     Comment: Rarely drinks    Drug use: Never    Sexual activity: Defer        Physical Exam   Physical Exam  Patient is in no acute distress, with nonlabored breathing.  On the medial portion of the left breast, there is a 2 cm lesion, which appears to be a sebaceous cyst. It exhibits some erythema. It is tender to palpation and is mildly infected.        Result Review :            Results           Assessment and Plan    Diagnoses and all orders for this visit:    1. Sebaceous cyst (Primary)    Other orders  -     clindamycin (Cleocin) 300 MG capsule; Take 1 capsule by mouth 3 (Three) Times a Day.  Dispense: 21 capsule; Refill: 0          Assessment & Plan  1. Infected sebaceous cyst in her chest.  We will plan for excision in the operating room. Risks, benefits, alternatives of the procedure were discussed extensively. All questions were answered. Patient voiced understanding and agreed to proceed. In the meantime, I will call her in some clindamycin as she is not taking the antibiotic that was prescribed by her PCP secondary to concern of side effects.      Follow Up   No follow-ups on file.  Patient was given instructions and counseling regarding her condition or for health maintenance advice. Please see specific information pulled into the AVS if appropriate.     Patient or patient representative verbalized consent for the use of Ambient Listening during the visit with  Eliseo Cannon MD for chart documentation. 8/8/2024  15:21 EDT    Eliseo Cannon MD

## 2024-08-14 NOTE — PRE-PROCEDURE INSTRUCTIONS
IMPORTANT INSTRUCTIONS - PRE-ADMISSION TESTING  DO NOT EAT/DRINK OR CHEW anything after midnight the night before your procedure.       Take the following medications the morning of your procedure with JUST A SIP OF WATER:  ALLERGY RELIEF, ALLOPURINOL, CLEOCIN, DOXYCLOMINE, TRICOR, PROTONIX, POTASSIUM _______________________________________________________________________________________________________________________________________________________________________________________    DO NOT BRING your medications to the hospital with you, UNLESS something has changed since your PRE-Admission Testing appointment.  Hold all vitamins, supplements, and NSAIDS (Non- steroidal anti-inflammatory meds) for one week prior to surgery (you MAY take Tylenol or Acetaminophen).  If you are diabetic, check your blood sugar the morning of your procedure. If it is less than 70 or if you are feeling symptomatic, call the following number for further instructions: [phone]  Use your inhalers/nebulizers as usual, the morning of your procedure. BRING YOUR INHALERS with you.   Bring your CPAP or BIPAP to hospital, ONLY IF YOU WILL BE SPENDING THE NIGHT.   Make sure you have a ride home and have someone who will stay with you the day of your procedure after you go home.  If you have any questions, please call your Pre-Admission Testing Nurse, ALONA_________ at [phone]______.   Per anesthesia request, do not smoke for 24 hours before your procedure or as instructed by your surgeon.    PREOPERATIVE (BEFORE SURGERY)              BATHING INSTRUCTIONS  Instructions:    You will need to shower 1  times utilizing the soap provided; at the times indicated   below:     MORNING OF SURGERY    Wash your hair and face with normal shampoo and soap, rinse it well before using the surgical soap.      In the shower, wet the skin completely with water from your neck to your feet. Apply the cleanser to your   body ONLY FROM THE NECK TO YOUR  FEET.     Do NOT USE THE CLEANSER ON YOUR FACE, HEAD, OR GENITAL (PRIVATE) AREAS.   Keep it out of your eyes, ears, and mouth because of the risk of injury to those areas.      Scrub with a clean washcloth for each bath utilizing the soap provided from the top of your body to the   bottom starting at the neck area.      Pay close attention to your armpits, groin area, and the site of surgery.      Wash your body gently for 5 minutes. Stand outside the stream or turn off the water while scrubbing your   body. Do NOT wash with your regular soap after the surgical cleanser is used.      RINSE THE CLEANSER OFF COMPLETELY with plenty of water. Rinse the area again thoroughly.      Dry off with a clean towel. The surgical soap can cause dryness; however do NOT APPLY LOTION,   CREAM, POWDER, and/or DEODORANT AFTER SHOWERING.     Be sure to where clean clothes after showering.      Ensure CLEAN BED LINENS AFTER FIRST wash with the surgical soap.      NO PETS ALLOWED IN THE BED with you after utilizing the surgical soap.

## 2024-08-15 ENCOUNTER — ANESTHESIA EVENT (OUTPATIENT)
Dept: PERIOP | Facility: HOSPITAL | Age: 69
End: 2024-08-15
Payer: COMMERCIAL

## 2024-08-15 ENCOUNTER — TELEPHONE (OUTPATIENT)
Dept: INTERNAL MEDICINE | Facility: CLINIC | Age: 69
End: 2024-08-15
Payer: COMMERCIAL

## 2024-08-15 RX ORDER — COLCHICINE 0.6 MG/1
0.6 TABLET ORAL DAILY
Qty: 3 TABLET | Refills: 0 | Status: SHIPPED | OUTPATIENT
Start: 2024-08-15

## 2024-08-15 NOTE — TELEPHONE ENCOUNTER
Ok let her know I sent in 3 days of colchicine that should decrease the flare and she needs to keep taking the allopurinol daily to help prevent it in the future.

## 2024-08-15 NOTE — TELEPHONE ENCOUNTER
Caller: India Gaytan    Relationship: Self    Best call back number: [phone]    What medication are you requesting: STRONGER MEDICINE THAN WHAT WAS PRESCRIBED FOR GOUT    Have you had these symptoms before:    [x] Yes  [] No    Have you been treated for these symptoms before:   [x] Yes  [] No    If a prescription is needed, what is your preferred pharmacy and phone number: [facility] - [place], [place] - [address] [facility] [phone] [facility] [phone] FX     Additional notes:  MEDICATION PATIENT WAS PRESCRIBED FOR GOUT IS NOT WORKING. PATIENT WOULD LIKE SOMETHING STRONGER TO HELP.

## 2024-08-16 ENCOUNTER — HOSPITAL ENCOUNTER (OUTPATIENT)
Facility: HOSPITAL | Age: 69
Setting detail: HOSPITAL OUTPATIENT SURGERY
Discharge: HOME | End: 2024-08-16
Attending: SURGERY | Admitting: SURGERY
Payer: COMMERCIAL

## 2024-08-16 ENCOUNTER — ANESTHESIA (OUTPATIENT)
Dept: PERIOP | Facility: HOSPITAL | Age: 69
End: 2024-08-16
Payer: COMMERCIAL

## 2024-08-16 VITALS
RESPIRATION RATE: 18 BRPM | DIASTOLIC BLOOD PRESSURE: 44 MMHG | BODY MASS INDEX: 36.64 KG/M2 | SYSTOLIC BLOOD PRESSURE: 110 MMHG | HEART RATE: 62 BPM | WEIGHT: 227.96 LBS | HEIGHT: 66 IN | OXYGEN SATURATION: 98 % | TEMPERATURE: 96.6 F

## 2024-08-16 DIAGNOSIS — R22.2 CHEST MASS: ICD-10-CM

## 2024-08-16 LAB
GLUCOSE BLDC GLUCOMTR-MCNC: 78 MG/DL (ref 70–99)
GLUCOSE BLDC GLUCOMTR-MCNC: 93 MG/DL (ref 70–99)

## 2024-08-16 PROCEDURE — 25010000002 MIDAZOLAM PER 1MG: Performed by: ANESTHESIOLOGY

## 2024-08-16 PROCEDURE — 25010000002 CEFAZOLIN PER 500 MG: Performed by: SURGERY

## 2024-08-16 PROCEDURE — 21555 EXC NECK LES SC < 3 CM: CPT | Performed by: SURGERY

## 2024-08-16 PROCEDURE — 25010000002 LIDOCAINE 1 % SOLUTION 20 ML VIAL: Performed by: SURGERY

## 2024-08-16 PROCEDURE — 25010000002 PROPOFOL 200 MG/20ML EMULSION

## 2024-08-16 PROCEDURE — 25810000003 LACTATED RINGERS PER 1000 ML: Performed by: ANESTHESIOLOGY

## 2024-08-16 PROCEDURE — 25010000002 ONDANSETRON PER 1 MG

## 2024-08-16 PROCEDURE — 82948 REAGENT STRIP/BLOOD GLUCOSE: CPT

## 2024-08-16 PROCEDURE — 25010000002 DEXAMETHASONE PER 1 MG

## 2024-08-16 PROCEDURE — 25010000002 FENTANYL CITRATE (PF) 50 MCG/ML SOLUTION

## 2024-08-16 PROCEDURE — 88304 TISSUE EXAM BY PATHOLOGIST: CPT | Performed by: SURGERY

## 2024-08-16 PROCEDURE — 21552 EXC NECK LES SC 3 CM/>: CPT | Performed by: SPECIALIST/TECHNOLOGIST, OTHER

## 2024-08-16 PROCEDURE — 25010000002 KETOROLAC TROMETHAMINE PER 15 MG

## 2024-08-16 RX ORDER — SCOLOPAMINE TRANSDERMAL SYSTEM 1 MG/1
1 PATCH, EXTENDED RELEASE TRANSDERMAL ONCE
Status: DISCONTINUED | OUTPATIENT
Start: 2024-08-16 | End: 2024-08-16 | Stop reason: HOSPADM

## 2024-08-16 RX ORDER — DEXAMETHASONE SODIUM PHOSPHATE 4 MG/ML
INJECTION, SOLUTION INTRA-ARTICULAR; INTRALESIONAL; INTRAMUSCULAR; INTRAVENOUS; SOFT TISSUE AS NEEDED
Status: DISCONTINUED | OUTPATIENT
Start: 2024-08-16 | End: 2024-08-16 | Stop reason: SURG

## 2024-08-16 RX ORDER — PROPOFOL 10 MG/ML
INJECTION, EMULSION INTRAVENOUS AS NEEDED
Status: DISCONTINUED | OUTPATIENT
Start: 2024-08-16 | End: 2024-08-16 | Stop reason: SURG

## 2024-08-16 RX ORDER — ACETAMINOPHEN 500 MG
1000 TABLET ORAL ONCE
Status: COMPLETED | OUTPATIENT
Start: 2024-08-16 | End: 2024-08-16

## 2024-08-16 RX ORDER — ONDANSETRON 2 MG/ML
INJECTION INTRAMUSCULAR; INTRAVENOUS AS NEEDED
Status: DISCONTINUED | OUTPATIENT
Start: 2024-08-16 | End: 2024-08-16 | Stop reason: SURG

## 2024-08-16 RX ORDER — PROMETHAZINE HYDROCHLORIDE 25 MG/1
25 SUPPOSITORY RECTAL ONCE AS NEEDED
Status: DISCONTINUED | OUTPATIENT
Start: 2024-08-16 | End: 2024-08-16 | Stop reason: HOSPADM

## 2024-08-16 RX ORDER — LIDOCAINE HYDROCHLORIDE 20 MG/ML
INJECTION, SOLUTION EPIDURAL; INFILTRATION; INTRACAUDAL; PERINEURAL AS NEEDED
Status: DISCONTINUED | OUTPATIENT
Start: 2024-08-16 | End: 2024-08-16 | Stop reason: SURG

## 2024-08-16 RX ORDER — MIDAZOLAM HYDROCHLORIDE 2 MG/2ML
2 INJECTION, SOLUTION INTRAMUSCULAR; INTRAVENOUS ONCE
Status: COMPLETED | OUTPATIENT
Start: 2024-08-16 | End: 2024-08-16

## 2024-08-16 RX ORDER — SODIUM CHLORIDE 0.9 % (FLUSH) 0.9 %
10 SYRINGE (ML) INJECTION AS NEEDED
Status: DISCONTINUED | OUTPATIENT
Start: 2024-08-16 | End: 2024-08-16 | Stop reason: HOSPADM

## 2024-08-16 RX ORDER — EPHEDRINE SULFATE 50 MG/ML
INJECTION INTRAVENOUS AS NEEDED
Status: DISCONTINUED | OUTPATIENT
Start: 2024-08-16 | End: 2024-08-16 | Stop reason: SURG

## 2024-08-16 RX ORDER — DEXMEDETOMIDINE HYDROCHLORIDE 4 UG/ML
INJECTION, SOLUTION INTRAVENOUS AS NEEDED
Status: DISCONTINUED | OUTPATIENT
Start: 2024-08-16 | End: 2024-08-16 | Stop reason: SURG

## 2024-08-16 RX ORDER — KETOROLAC TROMETHAMINE 30 MG/ML
INJECTION, SOLUTION INTRAMUSCULAR; INTRAVENOUS AS NEEDED
Status: DISCONTINUED | OUTPATIENT
Start: 2024-08-16 | End: 2024-08-16 | Stop reason: SURG

## 2024-08-16 RX ORDER — OXYCODONE HYDROCHLORIDE 5 MG/1
5 TABLET ORAL
Status: DISCONTINUED | OUTPATIENT
Start: 2024-08-16 | End: 2024-08-16 | Stop reason: HOSPADM

## 2024-08-16 RX ORDER — HYDROCODONE BITARTRATE AND ACETAMINOPHEN 5; 325 MG/1; MG/1
1 TABLET ORAL ONCE AS NEEDED
Status: DISCONTINUED | OUTPATIENT
Start: 2024-08-16 | End: 2024-08-16 | Stop reason: HOSPADM

## 2024-08-16 RX ORDER — SODIUM CHLORIDE 0.9 % (FLUSH) 0.9 %
10 SYRINGE (ML) INJECTION EVERY 12 HOURS SCHEDULED
Status: DISCONTINUED | OUTPATIENT
Start: 2024-08-16 | End: 2024-08-16 | Stop reason: HOSPADM

## 2024-08-16 RX ORDER — MAGNESIUM HYDROXIDE 1200 MG/15ML
LIQUID ORAL AS NEEDED
Status: DISCONTINUED | OUTPATIENT
Start: 2024-08-16 | End: 2024-08-16 | Stop reason: HOSPADM

## 2024-08-16 RX ORDER — PROMETHAZINE HYDROCHLORIDE 12.5 MG/1
25 TABLET ORAL ONCE AS NEEDED
Status: DISCONTINUED | OUTPATIENT
Start: 2024-08-16 | End: 2024-08-16 | Stop reason: HOSPADM

## 2024-08-16 RX ORDER — ONDANSETRON 2 MG/ML
4 INJECTION INTRAMUSCULAR; INTRAVENOUS ONCE AS NEEDED
Status: DISCONTINUED | OUTPATIENT
Start: 2024-08-16 | End: 2024-08-16 | Stop reason: HOSPADM

## 2024-08-16 RX ORDER — SODIUM CHLORIDE, SODIUM LACTATE, POTASSIUM CHLORIDE, CALCIUM CHLORIDE 600; 310; 30; 20 MG/100ML; MG/100ML; MG/100ML; MG/100ML
9 INJECTION, SOLUTION INTRAVENOUS CONTINUOUS PRN
Status: DISCONTINUED | OUTPATIENT
Start: 2024-08-16 | End: 2024-08-16 | Stop reason: HOSPADM

## 2024-08-16 RX ORDER — SODIUM CHLORIDE 9 MG/ML
40 INJECTION, SOLUTION INTRAVENOUS AS NEEDED
Status: DISCONTINUED | OUTPATIENT
Start: 2024-08-16 | End: 2024-08-16 | Stop reason: HOSPADM

## 2024-08-16 RX ORDER — FENTANYL CITRATE 50 UG/ML
INJECTION, SOLUTION INTRAMUSCULAR; INTRAVENOUS AS NEEDED
Status: DISCONTINUED | OUTPATIENT
Start: 2024-08-16 | End: 2024-08-16 | Stop reason: SURG

## 2024-08-16 RX ADMIN — DEXMEDETOMIDINE HYDROCHLORIDE IN 0.9% SODIUM CHLORIDE 4 MCG: 4 INJECTION INTRAVENOUS at 10:43

## 2024-08-16 RX ADMIN — FENTANYL CITRATE 25 MCG: 50 INJECTION, SOLUTION INTRAMUSCULAR; INTRAVENOUS at 10:51

## 2024-08-16 RX ADMIN — FENTANYL CITRATE 25 MCG: 50 INJECTION, SOLUTION INTRAMUSCULAR; INTRAVENOUS at 10:42

## 2024-08-16 RX ADMIN — SODIUM CHLORIDE, POTASSIUM CHLORIDE, SODIUM LACTATE AND CALCIUM CHLORIDE 9 ML/HR: 600; 310; 30; 20 INJECTION, SOLUTION INTRAVENOUS at 10:14

## 2024-08-16 RX ADMIN — PROPOFOL 100 MG: 10 INJECTION, EMULSION INTRAVENOUS at 10:27

## 2024-08-16 RX ADMIN — SCOPALAMINE 1 PATCH: 1 PATCH, EXTENDED RELEASE TRANSDERMAL at 08:30

## 2024-08-16 RX ADMIN — KETOROLAC TROMETHAMINE 15 MG: 30 INJECTION, SOLUTION INTRAMUSCULAR; INTRAVENOUS at 10:44

## 2024-08-16 RX ADMIN — ACETAMINOPHEN 1000 MG: 500 TABLET ORAL at 08:27

## 2024-08-16 RX ADMIN — EPHEDRINE SULFATE 10 MG: 50 INJECTION INTRAVENOUS at 10:36

## 2024-08-16 RX ADMIN — DEXAMETHASONE SODIUM PHOSPHATE 4 MG: 4 INJECTION, SOLUTION INTRAMUSCULAR; INTRAVENOUS at 10:30

## 2024-08-16 RX ADMIN — FENTANYL CITRATE 50 MCG: 50 INJECTION, SOLUTION INTRAMUSCULAR; INTRAVENOUS at 10:27

## 2024-08-16 RX ADMIN — ONDANSETRON 4 MG: 2 INJECTION INTRAMUSCULAR; INTRAVENOUS at 10:30

## 2024-08-16 RX ADMIN — SODIUM CHLORIDE 2000 MG: 9 INJECTION, SOLUTION INTRAVENOUS at 10:31

## 2024-08-16 RX ADMIN — MIDAZOLAM HYDROCHLORIDE 2 MG: 1 INJECTION, SOLUTION INTRAMUSCULAR; INTRAVENOUS at 10:14

## 2024-08-16 RX ADMIN — LIDOCAINE HYDROCHLORIDE 100 MG: 20 INJECTION, SOLUTION EPIDURAL; INFILTRATION; INTRACAUDAL; PERINEURAL at 10:27

## 2024-08-16 NOTE — OP NOTE
Preoperative diagnosis: Chest wall mass    Postoperative diagnosis: Same    Findings: 1.5 cm mass of the chest wall which appeared to be a epidermal inclusion cyst; completely excised from the deep subcutaneous tissue    Procedure: Excision of chest wall mass    Surgeon: Eliseo Cannon MD    Anesthesia: General    Assistant: Milla GAYTAN CSA    EBL: 3 mL    Specimens: Chest mass    Complications: None    Indications: 69-year-old female with an increasingly enlarging and symptomatic chest mass which has had some drainage.  Presents today for elective excision.    PROCEDURE    Patient was seen in the preoperative holding area.  Risks, benefits, alternatives of the operation were again discussed in detail.  All of the patient and family's questions were answered.  They voiced understanding, and agreed to proceed.    Patient was brought to the operating room.  Monitoring devices and Dilip stockings were placed.  Anesthesia was administered.  Patient was prepped and draped in standard surgical fashion.  After prepping and draping a timeout was performed to verify both the correct patient and correct procedure.    We began by injecting local anesthesia around the area of the mass.  Elliptical incision was made around the mass we carried our dissection down with a combination of sharp dissection and electrocautery.  We circumferentially dissected the mass free from the surrounding tissues.  We dissected until we encountered normal healthy appearing tissue.  The mass was amputated.  It was passed off for pathology.  We irrigated the wound bed copiously.    Wound was closed with deep interrupted 3-0 Vicryl sutures.  Skin was closed with a running subcuticular 4-0 Vicryl suture.  The wound was dressed with skin affix skin glue.    The patient was then aroused from anesthesia, and taken off the OR table, and taken to PACU in stable condition.  Sponge, needle, and instrument counts were correct x2.  Milla GAYTAN CSA, was present  for the entire procedure and helped in all portions of the case.    Assistant: Milla Lopez CSA was responsible for performing the following activities: Retraction, Suction, Irrigation, Suturing, Closing, and Placing Dressing and their skilled assistance was necessary for the success of this case.      The operative note was dictated with the help of the dragon dictation system.

## 2024-08-16 NOTE — ANESTHESIA PREPROCEDURE EVALUATION
Anesthesia Evaluation     Patient summary reviewed and Nursing notes reviewed   history of anesthetic complications:  PONV prolonged sedation  NPO Solid Status: > 8 hours  NPO Liquid Status: > 2 hours           Airway   Mallampati: II  TM distance: >3 FB  Neck ROM: full  No difficulty expected  Dental - normal exam     Pulmonary - negative pulmonary ROS and normal exam    breath sounds clear to auscultation  Cardiovascular - normal exam  Exercise tolerance: good (4-7 METS)    Rhythm: regular  Rate: normal    (+) hypertension (olmesartan/hctz), valvular problems/murmurs, hyperlipidemia      Neuro/Psych  (+) numbness  GI/Hepatic/Renal/Endo    (+) obesity (Mounjaro (hasn't taken in two weeks)), diabetes mellitus (prediabetes) type 2 well controlled    Musculoskeletal     Abdominal   (+) obese   Substance History - negative use     OB/GYN negative ob/gyn ROS         Other   arthritis,     ROS/Med Hx Other: PAT Nursing Notes unavailable.                     Anesthesia Plan    ASA 2     general and MAC     (Patient understands anesthesia not responsible for dental damage.)  intravenous induction     Anesthetic plan, risks, benefits, and alternatives have been provided, discussed and informed consent has been obtained with: patient.    Use of blood products discussed with patient .    Plan discussed with CRNA.        CODE STATUS:

## 2024-08-16 NOTE — DISCHARGE INSTRUCTIONS
DISCHARGE INSTRUCTIONS  SURGICAL / AMBULATORY  PROCEDURES      For your surgery you had:  General anesthesia (you may have a sore throat for the first 24 hours)  Local anesthesia  You received a medicated patch for nausea prevention today (behind your ear). It is recommended that you remove it 24-48 hours post-operatively. It must be removed within 72 hours.     You may experience dizziness, drowsiness, or light-headedness for several hours following surgery/procedure.  Do not stay alone today or tonight.  Limit your activity for 24 hours.  Resume your diet slowly.  Follow whatever special dietary instructions you may have been given by your doctor.  You should not drive or operate machinery, drink alcohol, or sign legally binding documents for 24 hours or while you are taking pain medication.     NOTIFY YOUR DOCTOR IF YOU EXPERIENCE ANY OF THE FOLLOWING:  Temperature greater than 101 degrees Fahrenheit  Shaking Chills  Redness or excessive drainage from incision  Nausea, vomiting and/or pain that is not controlled by prescribed medications  Increase in bleeding or bleeding that is excessive  Unable to urinate in 6 hours after surgery  If unable to reach your doctor, please go to the closest Emergency Room  Skin glue will flake off in 10-14 days.   You may shower tomorrow.  Apply an ice pack 24-48 hours.  Medications per physician instructions as indicated on Discharge Medication Information Sheet.    Last dose of pain medication was given at:  Tylenol 1000 mg given at 8:27. Do not exceed 4000 mg in a 24 hour period.  Toradol given at 10:44. May have ibuprofen at 4:44 pm.    SPECIAL INSTRUCTIONS:  Avoid strenuous activity or heavy lifting for 7 days.  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

## 2024-08-16 NOTE — ANESTHESIA POSTPROCEDURE EVALUATION
Patient: India Gaytan    Procedure Summary       Date: 08/16/24 Room / Location: [facility] OR  / [facility] MAIN OR    Anesthesia Start: 1021 Anesthesia Stop: 1101    Procedure: CHEST WALL MASS EXCISION (Chest) Diagnosis:       Chest mass      (Chest mass [R22.2])    Surgeons: Eliseo Cannon MD Provider: Scarlet Feliciano MD    Anesthesia Type: general, MAC ASA Status: 2            Anesthesia Type: general, MAC    Vitals  Vitals Value Taken Time   /56 08/16/24 1135   Temp 36.4 °C (97.6 °F) 08/16/24 1130   Pulse 70 08/16/24 1140   Resp 18 08/16/24 1130   SpO2 95 % 08/16/24 1140   Vitals shown include unfiled device data.        Post Anesthesia Care and Evaluation    Patient location during evaluation: bedside  Patient participation: complete - patient participated  Level of consciousness: awake  Pain management: adequate    Airway patency: patent  PONV Status: none  Cardiovascular status: acceptable and stable  Respiratory status: acceptable  Hydration status: acceptable

## 2024-08-26 ENCOUNTER — TELEPHONE (OUTPATIENT)
Dept: SURGERY | Facility: CLINIC | Age: 69
End: 2024-08-26
Payer: COMMERCIAL

## 2024-08-26 NOTE — TELEPHONE ENCOUNTER
Hub staff attempted to follow warm transfer process and was unsuccessful     Caller: India Gaytan    Relationship to patient: Self    Best call back number: [phone]     Patient is needing: PT TESTED POS FOR COVID FRI 08/23/24 WANTED TO KNOW IF SHE NEEDED TO R/S APPT. [facility] NEXT AVAIL W/ DR RAMIRES 09/12/24. NO CHANGES MADE TO PT's CURRENT APPT ON 08/28/24

## 2024-08-28 ENCOUNTER — OFFICE VISIT (OUTPATIENT)
Dept: SURGERY | Facility: CLINIC | Age: 69
End: 2024-08-28
Payer: MEDICARE

## 2024-08-28 VITALS
HEIGHT: 66 IN | WEIGHT: 220 LBS | BODY MASS INDEX: 35.36 KG/M2 | RESPIRATION RATE: 16 BRPM | HEART RATE: 59 BPM | SYSTOLIC BLOOD PRESSURE: 123 MMHG | DIASTOLIC BLOOD PRESSURE: 71 MMHG

## 2024-08-28 DIAGNOSIS — L72.3 SEBACEOUS CYST: Primary | ICD-10-CM

## 2024-08-28 NOTE — PROGRESS NOTES
Chief Complaint: Post-op (Excision of chest wall mass )    Subjective         History of Present Illness      India Gaytan is a 69 y.o. female presents to [facility] GENERAL SURGERY     History of Present Illness  The patient presents for evaluation of a ruptured epidermal inclusion cyst on her chest.    She reports that the skin over the cyst has slightly opened up, but she is comfortable with the current bandage. She also mentions that all her tests have returned negative results.    Objective     Past Medical History:   Diagnosis Date    Anemia     ASYMPTOMATIC    Anesthesia     SLOW TO WAKE UP POSTOP     Benign essential hypertension     Chronic allergic rhinitis     Essential hypertension     Gout 05/30/2019    greatly improved    High cholesterol     Hyperlipidemia     Lower extremity edema 09/18/2017    cont compression stockings can do lymphedema therapy if she wishes in the future    Lumbar back pain with radiculopathy affecting right lower extremity 06/12/2015    Mixed hyperlipidemia     Osteoarthritis of right hip 06/12/2015    Pain of right hip joint 09/18/2017    has chronic arthritis of that hip with tendonitits seen previously in MRI however she doesn't wish to do pt at this time, will try stretches that she has done in the past at home currently    PONV (postoperative nausea and vomiting)     Seasonal allergies     Ventral hernia CURRENTLY       Past Surgical History:   Procedure Laterality Date    ABDOMINAL HYSTERECTOMY      BACK SURGERY      BREAST CYST EXCISION      COLON RESECTION  2020    DR RAMIRES    COLON SURGERY      RELATED TO ABSCESS    COLONOSCOPY  2006, 2019, 2020    COLONOSCOPY N/A 11/08/2022    Procedure: COLONOSCOPY;  Surgeon: Aly Rangel MD;  Location: [facility] ENDOSCOPY;  Service: Gastroenterology;  Laterality: N/A;  DIVERTICULOSIS, ANASTOMOSIS AT 15CM    ENDOSCOPY N/A 11/08/2022    Procedure: ESOPHAGOGASTRODUODENOSCOPY WITH BX;  Surgeon: Juan Carlos  Aly Mcmahon MD;  Location: [facility] ENDOSCOPY;  Service: Gastroenterology;  Laterality: N/A;  HIATAL HERNIA    EXCISION LESION N/A 06/13/2022    Procedure: EXCISION BACK MASS;  Surgeon: Eliseo Cannon MD;  Location: [facility] MAIN OR;  Service: General;  Laterality: N/A;    EXCISION LESION N/A 08/16/2024    Procedure: CHEST WALL MASS EXCISION;  Surgeon: Eliseo Cannon MD;  Location: [facility] MAIN OR;  Service: General;  Laterality: N/A;    HERNIA REPAIR  12/2020    VENTRAL HERNIA     HYSTERECTOMY  1996    OOPHORECTOMY  1999    ROTATOR CUFF REPAIR Left 2011    VENTRAL HERNIA REPAIR N/A 08/02/2021    Procedure: VENTRAL HERNIA REPAIR LAPAROSCOPIC WITH DAVINCI ROBOT;  Surgeon: Eliseo Cannon MD;  Location: [facility] MAIN OR;  Service: Robotics - DaVinci;  Laterality: N/A;    VERTEBROPLASTY  08/07/2020    T11         Current Outpatient Medications:     albuterol sulfate  (90 Base) MCG/ACT inhaler, Inhale 2 puffs Every 4 (Four) Hours As Needed for Wheezing or Shortness of Air (USES FOR ALLEGERIES)., Disp: , Rfl:     Allergy Relief 5 MG tablet, Take 1 tablet by mouth Daily., Disp: 90 tablet, Rfl: 1    allopurinol (Zyloprim) 100 MG tablet, Take 1 tablet by mouth Daily., Disp: 90 tablet, Rfl: 1    azelastine (ASTELIN) 0.1 % nasal spray, SPRAY ONE TO TWO SPRAYS IN each nostril TWICE DAILY, Disp: , Rfl:     benzonatate (TESSALON) 100 MG capsule, Take 1 capsule by mouth 3 (Three) Times a Day As Needed for Cough., Disp: 30 capsule, Rfl: 0    colchicine 0.6 MG tablet, Take 1 tablet by mouth Daily., Disp: 3 tablet, Rfl: 0    dicyclomine (BENTYL) 10 MG capsule, Take 1 capsule by mouth 4 (Four) Times a Day Before Meals & at Bedtime., Disp: 30 capsule, Rfl: 1    EPINEPHrine (EPIPEN) 0.3 MG/0.3ML solution auto-injector injection, Use as directed for acute allergic reaction., Disp: , Rfl:     fenofibrate (TRICOR) 145 MG tablet, Take 1 tablet by mouth Daily., Disp: 90 tablet, Rfl: 1    fluticasone (FLONASE) 50 MCG/ACT nasal  spray, SPRAY TWO SPRAY IN each nostril EVERY DAY, Disp: , Rfl:     furosemide (LASIX) 20 MG tablet, Take 1 tablet by mouth Daily., Disp: 90 tablet, Rfl: 1    indomethacin (INDOCIN) 25 MG capsule, Take 1 capsule by mouth 3 (Three) Times a Day As Needed for Mild Pain., Disp: 15 capsule, Rfl: 0    montelukast (SINGULAIR) 10 MG tablet, Take 1 tablet by mouth Every Night., Disp: 90 tablet, Rfl: 1    olmesartan-hydrochlorothiazide (BENICAR HCT) 20-12.5 MG per tablet, Take 1 tablet by mouth Daily., Disp: 90 tablet, Rfl: 1    ondansetron (Zofran) 4 MG tablet, Take 1 tablet by mouth Every 8 (Eight) Hours As Needed for Nausea or Vomiting., Disp: 30 tablet, Rfl: 0    pantoprazole (PROTONIX) 40 MG EC tablet, Take 1 tablet by mouth Daily., Disp: 90 tablet, Rfl: 1    Pataday 0.7 % solution, Administer 1 drop to both eyes Daily As Needed. NOT USED IN A WHILE, Disp: , Rfl:     potassium chloride (MICRO-K) 10 MEQ CR capsule, Take 1 capsule by mouth 2 (Two) Times a Day., Disp: 180 capsule, Rfl: 1    promethazine-dextromethorphan (PROMETHAZINE-DM) 6.25-15 MG/5ML syrup, Take 5 mL by mouth 4 (Four) Times a Day As Needed for Cough., Disp: 180 mL, Rfl: 0    Tirzepatide (MOUNJARO) 5 MG/0.5ML solution pen-injector pen, Inject 0.5 mL under the skin into the appropriate area as directed 1 (One) Time Per Week., Disp: 4 mL, Rfl: 1    VITAMIN D, ERGOCALCIFEROL, PO, Take  by mouth Daily., Disp: , Rfl:     Current Facility-Administered Medications:     patient supplied allergy injection, 0.5 mL, Subcutaneous, Q28 Days, Lisa Jones MD, 0.5 mL at 07/31/24 1034    patient supplied allergy injection, 0.5 mL, Subcutaneous, Q28 Days, Lisa Jones MD, 0.5 mL at 07/31/24 1033    Allergies   Allergen Reactions    Levofloxacin Nausea And Vomiting     Pt reported also caused her to pass out         Family History   Problem Relation Age of Onset    Heart disease Mother     Heart disease Father     Colon cancer Neg Hx     Malig Hyperthermia Neg  Hx        Social History     Socioeconomic History    Marital status:    Tobacco Use    Smoking status: Never     Passive exposure: Never    Smokeless tobacco: Never   Vaping Use    Vaping status: Never Used   Substance and Sexual Activity    Alcohol use: Yes     Comment: Rarely drinks    Drug use: Never    Sexual activity: Defer        Physical Exam   Physical Exam  No acute distress observed.  Abdomen exhibits softness.  Chest incision is healing well with no signs of infection. It is open and draining from the middle part of the incision. A small amount of mucoid drainage is present, but it is not infected or draining pus.        Result Review :            Results  Laboratory Studies  Pathology report indicates a ruptured epidermal inclusion cyst.         Assessment and Plan    Diagnoses and all orders for this visit:    1. Sebaceous cyst (Primary)          Assessment & Plan  1. Status post excision of epidermal inclusion cyst on the chest.  The incision has opened slightly, but it appears to be healing without any signs of infection. The patient is advised to keep the area clean and dry. She can shower and let soapy water run over the incision. If the incision starts to look red, drains red liquid, or develops a [place] of redness around it, she should contact the office. If the drainage becomes buddy pus or has a foul odor, antibiotics will be prescribed. She is instructed to apply vitamin A lotion to the area once the scab has formed and fallen off to help with swelling and scarring. If the scar becomes problematic, a revision can be considered once the tissue has healed.          Follow Up   No follow-ups on file.  Patient was given instructions and counseling regarding her condition or for health maintenance advice. Please see specific information pulled into the AVS if appropriate.     Patient or patient representative verbalized consent for the use of Ambient Listening during the visit with  Eliseo SANZ  MD Davis for chart documentation. 8/28/2024  18:45 EDT    Eliseo Cannon MD

## 2024-09-11 ENCOUNTER — TELEPHONE (OUTPATIENT)
Dept: INTERNAL MEDICINE | Facility: CLINIC | Age: 69
End: 2024-09-11
Payer: COMMERCIAL

## 2024-09-11 NOTE — TELEPHONE ENCOUNTER
Caller: India Gaytan    Relationship to patient: Self    Best call back number: [phone]     Chief complaint: ALLERGY SHOT    Type of visit: NURSE/MA    Requested date: 9/16/24 AT 9:15 AM     If rescheduling, when is the original appointment: 9/13/24

## 2024-09-23 ENCOUNTER — TELEPHONE (OUTPATIENT)
Dept: INTERNAL MEDICINE | Facility: CLINIC | Age: 69
End: 2024-09-23
Payer: COMMERCIAL

## 2024-10-01 ENCOUNTER — CLINICAL SUPPORT (OUTPATIENT)
Dept: INTERNAL MEDICINE | Facility: CLINIC | Age: 69
End: 2024-10-01
Payer: MEDICARE

## 2024-10-01 DIAGNOSIS — J30.9 ALLERGIC RHINITIS, UNSPECIFIED SEASONALITY, UNSPECIFIED TRIGGER: Primary | ICD-10-CM

## 2024-10-01 PROCEDURE — 95117 IMMUNOTHERAPY INJECTIONS: CPT | Performed by: INTERNAL MEDICINE

## 2024-10-25 ENCOUNTER — TELEPHONE (OUTPATIENT)
Dept: INTERNAL MEDICINE | Facility: CLINIC | Age: 69
End: 2024-10-25
Payer: COMMERCIAL

## 2024-10-25 NOTE — TELEPHONE ENCOUNTER
Caller: India Gaytan    Relationship: Self    Best call back number: [phone]     What medication are you requesting: MOUNJARO 7.5    Have you had these symptoms before:    [x] Yes  [] No    Have you been treated for these symptoms before:   [x] Yes  [] No    If a prescription is needed, what is your preferred pharmacy and phone number: [id number] - [place], [place] - [address] [facility] AT [facility] [address] [facility] & [place] - [phone] [facility] [phone] FX     Additional notes: PATIENT STATES THAT DR DEAN DID NOT RAISE THE DOSE PREVIOUSLY BECAUSE THE PATIENT WAS HAVING DIARRHEA AND NAUSEA BUT SHE HAS NOW GONE WITHOUT THOSE SYMPTOMS FOR THREE MONTHS.

## 2024-10-25 NOTE — TELEPHONE ENCOUNTER
Please call and let her know that I called in the 7.5mg dose for her.  Monitor for nausea/vomiting.

## 2024-11-08 ENCOUNTER — CLINICAL SUPPORT (OUTPATIENT)
Dept: INTERNAL MEDICINE | Facility: CLINIC | Age: 69
End: 2024-11-08
Payer: MEDICARE

## 2024-11-08 DIAGNOSIS — J30.9 ALLERGIC RHINITIS, UNSPECIFIED SEASONALITY, UNSPECIFIED TRIGGER: Primary | ICD-10-CM

## 2024-11-08 PROCEDURE — 95117 IMMUNOTHERAPY INJECTIONS: CPT | Performed by: INTERNAL MEDICINE

## 2024-11-08 NOTE — PROGRESS NOTES
Patient came into office this afternoon for administration of allergy injections x 2; see eMAR for details.  Tolerated well; left immediately following.  No 15 min OBS.    Jo Bhandari RN BSN  [facility]    lower resp tract infection  URI  poss PNA, however, favor post surgical changes on cxr - hx of right lung surgery with Dr. Handy  on emp ABX  will check biomarkers  wbc serially measured

## 2024-12-16 RX ORDER — PANTOPRAZOLE SODIUM 40 MG/1
40 TABLET, DELAYED RELEASE ORAL DAILY
Qty: 90 TABLET | Refills: 1 | Status: SHIPPED | OUTPATIENT
Start: 2024-12-16

## 2024-12-16 NOTE — TELEPHONE ENCOUNTER
Rx Refill Note  Requested Prescriptions     Pending Prescriptions Disp Refills    pantoprazole (PROTONIX) 40 MG EC tablet 90 tablet 1     Sig: Take 1 tablet by mouth Daily.      Last office visit with prescribing clinician: 7/31/2024   Last telemedicine visit with prescribing clinician: Visit date not found   Next office visit with prescribing clinician: 1/31/2025                         Would you like a call back once the refill request has been completed: [] Yes [] No    If the office needs to give you a call back, can they leave a voicemail: [] Yes [] No    Anne Austin MA  12/16/24, 11:53 EST

## 2024-12-17 RX ORDER — FUROSEMIDE 20 MG/1
20 TABLET ORAL DAILY
Qty: 90 TABLET | Refills: 1 | Status: SHIPPED | OUTPATIENT
Start: 2024-12-17

## 2024-12-17 RX ORDER — FENOFIBRATE 145 MG/1
145 TABLET, COATED ORAL DAILY
Qty: 90 TABLET | Refills: 1 | Status: SHIPPED | OUTPATIENT
Start: 2024-12-17

## 2024-12-17 RX ORDER — POTASSIUM CHLORIDE 750 MG/1
10 CAPSULE, EXTENDED RELEASE ORAL 2 TIMES DAILY
Qty: 180 CAPSULE | Refills: 1 | Status: SHIPPED | OUTPATIENT
Start: 2024-12-17

## 2024-12-19 ENCOUNTER — TELEPHONE (OUTPATIENT)
Dept: INTERNAL MEDICINE | Facility: CLINIC | Age: 69
End: 2024-12-19
Payer: COMMERCIAL

## 2024-12-19 NOTE — TELEPHONE ENCOUNTER
Hub staff attempted to follow warm transfer process and was unsuccessful     Caller: India Gaytan    Relationship to patient: Self    Best call back number:     [phone]       Patient is needing: PATIENT CALLED TO CHANGE HER ALLERGY SHOT APPOINTMENT. SHE STATES THAT INSTEAD OF TOMORROW IT SHOULD BE NEXT FRIDAY, 12.27.24 AT 9:15      PATIENT WOULD LIKE A CALL BACK TO CONFIRM THIS CHANGE

## 2024-12-27 ENCOUNTER — CLINICAL SUPPORT (OUTPATIENT)
Dept: INTERNAL MEDICINE | Facility: CLINIC | Age: 69
End: 2024-12-27
Payer: MEDICARE

## 2024-12-27 DIAGNOSIS — J30.9 ALLERGIC RHINITIS, UNSPECIFIED SEASONALITY, UNSPECIFIED TRIGGER: Primary | ICD-10-CM

## 2024-12-27 PROCEDURE — 95117 IMMUNOTHERAPY INJECTIONS: CPT | Performed by: INTERNAL MEDICINE

## 2024-12-30 ENCOUNTER — PRIOR AUTHORIZATION (OUTPATIENT)
Dept: INTERNAL MEDICINE | Facility: CLINIC | Age: 69
End: 2024-12-30
Payer: COMMERCIAL

## 2024-12-30 NOTE — TELEPHONE ENCOUNTER
Mounjaro 2.5MG/0.5ML auto-injectors    We’re pleased to let you know that we’ve approved your or your doctor’s request for coverage  for Mounjaro 2.5mg/0.5mL Prefilled Pen Solution for Injection. You can now fill your  prescription, and it will be covered according to your plan.  As long as you remain covered by your prescription drug plan and there are no changes to your  plan benefits, this request is approved from 12/30/2024 to 12/30/2025. When this approval  expires, please speak to your doctor about your treatment.    
Patient Specific Otc Recommendations (Will Not Stick From Patient To Patient): Recommend starting otc rogaine and/or Nutrafol and/or Zenagen shampoo and serum
Detail Level: Zone

## 2025-01-02 ENCOUNTER — TELEPHONE (OUTPATIENT)
Dept: INTERNAL MEDICINE | Facility: CLINIC | Age: 70
End: 2025-01-02
Payer: COMMERCIAL

## 2025-01-02 NOTE — TELEPHONE ENCOUNTER
Called [facility] this morning #[phone] @ 11:00am and spoke with Susi in the injection room.  Confirmed that the patient's current allergy serum was reformulated and patient would need to come into office for allergy injections 1 - 3 times per week until buildup of 0.5 (maintenance dose).      Called patient to give her above information.  Patient verbalized understanding and voiced that she would like to come off her allergy injections soon but has to schedule a f/u appointment with allergist.  Confirmed appt for tomorrow at 9:15am for next injection.    Jo Bhandari RN BSN  [facility] [place] [facility]

## 2025-01-03 ENCOUNTER — CLINICAL SUPPORT (OUTPATIENT)
Dept: INTERNAL MEDICINE | Facility: CLINIC | Age: 70
End: 2025-01-03
Payer: MEDICARE

## 2025-01-03 DIAGNOSIS — J30.9 ALLERGIC RHINITIS, UNSPECIFIED SEASONALITY, UNSPECIFIED TRIGGER: Primary | ICD-10-CM

## 2025-01-03 PROCEDURE — 95117 IMMUNOTHERAPY INJECTIONS: CPT | Performed by: INTERNAL MEDICINE

## 2025-01-03 NOTE — PROGRESS NOTES
Patient came into office this afternoon for administration of allergy injections x 2; see eMAR for details.  Tolerated well; left immediately following.  No 15 min OBS.      History of Present Illness    Immunotherapy Pre-Injection Questionnaire:         Are you currently pregnant or been diagnosed with a new medical condition? No    1. Have you had increased asthmas symptoms (chest tightness, increased cough, wheezing, or shortness of breath) in the past week? No    2. Have you had increase allergy symptoms (itching eyes or nose, sneezing, runny nose, post-nasal drip, or throat-clearing) in the past week? No    3. Have you had a cold, respiratory tract infection, or flu-like symptoms in the past two weeks? No    4. Did you have any problems such as increased allergy or asthma symptoms, hives, or generalized itching within 12 hours or receiving your last injection? No    5. Are you on any new medications? New eye drops? No    6. Patient confirmed their name and birth date on allergy vials are correct. Yes       Jo Bhandari RN 1/3/2025

## 2025-01-10 ENCOUNTER — CLINICAL SUPPORT (OUTPATIENT)
Dept: INTERNAL MEDICINE | Facility: CLINIC | Age: 70
End: 2025-01-10
Payer: MEDICARE

## 2025-01-10 DIAGNOSIS — J30.9 ALLERGIC RHINITIS, UNSPECIFIED SEASONALITY, UNSPECIFIED TRIGGER: Primary | ICD-10-CM

## 2025-01-10 PROCEDURE — 95117 IMMUNOTHERAPY INJECTIONS: CPT | Performed by: INTERNAL MEDICINE

## 2025-01-10 NOTE — PROGRESS NOTES
Patient came into office this afternoon for administration of allergy injections x 2; see eMAR for details.  Tolerated well; left immediately following.  No 15 min OBS.      History of Present Illness    Immunotherapy Pre-Injection Questionnaire:         Are you currently pregnant or been diagnosed with a new medical condition? No    1. Have you had increased asthmas symptoms (chest tightness, increased cough, wheezing, or shortness of breath) in the past week? No    2. Have you had increase allergy symptoms (itching eyes or nose, sneezing, runny nose, post-nasal drip, or throat-clearing) in the past week? No    3. Have you had a cold, respiratory tract infection, or flu-like symptoms in the past two weeks? No    4. Did you have any problems such as increased allergy or asthma symptoms, hives, or generalized itching within 12 hours or receiving your last injection? No    5. Are you on any new medications? New eye drops? No    6. Patient confirmed their name and birth date on allergy vials are correct. Yes       Jo Bhandari RN 1/10/2025

## 2025-01-17 ENCOUNTER — CLINICAL SUPPORT (OUTPATIENT)
Dept: INTERNAL MEDICINE | Facility: CLINIC | Age: 70
End: 2025-01-17
Payer: MEDICARE

## 2025-01-17 DIAGNOSIS — J30.9 ALLERGIC RHINITIS, UNSPECIFIED SEASONALITY, UNSPECIFIED TRIGGER: Primary | ICD-10-CM

## 2025-01-17 PROCEDURE — 95117 IMMUNOTHERAPY INJECTIONS: CPT | Performed by: INTERNAL MEDICINE

## 2025-01-17 NOTE — PROGRESS NOTES
Patient came into office this afternoon for administration of allergy injections x 2; see eMAR for details.  Tolerated well; left immediately following.  No 15 min OBS.      History of Present Illness    Immunotherapy Pre-Injection Questionnaire:         Are you currently pregnant or been diagnosed with a new medical condition? No    1. Have you had increased asthmas symptoms (chest tightness, increased cough, wheezing, or shortness of breath) in the past week? No    2. Have you had increase allergy symptoms (itching eyes or nose, sneezing, runny nose, post-nasal drip, or throat-clearing) in the past week? No    3. Have you had a cold, respiratory tract infection, or flu-like symptoms in the past two weeks? No    4. Did you have any problems such as increased allergy or asthma symptoms, hives, or generalized itching within 12 hours or receiving your last injection? No    5. Are you on any new medications? New eye drops? No    6. Patient confirmed their name and birth date on allergy vials are correct. Yes         Jo Bhandari RN 1/17/2025

## 2025-01-22 ENCOUNTER — TELEPHONE (OUTPATIENT)
Dept: INTERNAL MEDICINE | Facility: CLINIC | Age: 70
End: 2025-01-22

## 2025-01-22 NOTE — TELEPHONE ENCOUNTER
Hub staff attempted to follow warm transfer process and was unsuccessful     Caller: India Gaytan    Relationship to patient: Self    Best call back number:     [phone]        Patient is needing: PATIENT STATES THAT SHE NEEDS TO RESCHEDULE HER NURSE VISIT FROM FRIDAY TO THURSDAY.     PATIENT WOULD LIKE TO KEEP IT AT THE SAME TIME      PLEASE CALL PATIENT TO CONFIRM NEW APPOINTMENT

## 2025-01-23 ENCOUNTER — CLINICAL SUPPORT (OUTPATIENT)
Dept: INTERNAL MEDICINE | Facility: CLINIC | Age: 70
End: 2025-01-23
Payer: MEDICARE

## 2025-01-23 ENCOUNTER — TELEPHONE (OUTPATIENT)
Dept: INTERNAL MEDICINE | Facility: CLINIC | Age: 70
End: 2025-01-23

## 2025-01-23 DIAGNOSIS — J30.9 ALLERGIC RHINITIS, UNSPECIFIED SEASONALITY, UNSPECIFIED TRIGGER: Primary | ICD-10-CM

## 2025-01-23 PROCEDURE — 95117 IMMUNOTHERAPY INJECTIONS: CPT | Performed by: INTERNAL MEDICINE

## 2025-01-23 NOTE — TELEPHONE ENCOUNTER
OK FOR HUB TO RELAY:    Lvm for patient informing her that appointment for Allergy shot on 1/30 has been cancelled and it has been added to her appointment notes on 1/31 for her to get her allergy shot then.    She is to call back if she has any questions

## 2025-01-23 NOTE — TELEPHONE ENCOUNTER
Hub staff attempted to follow warm transfer process and was unsuccessful     Caller: India Gaytan    Relationship to patient: Self    Best call back number: [phone]     Patient is needing: PATIENT STATES THAT SHE NEEDS TO MOVE HER ALLERGY SHOT FROM 1.30.25 TO BE DURING HER VISIT WITH DR DEAN ON 1.31.25.

## 2025-01-23 NOTE — PROGRESS NOTES
Patient came into office this afternoon for administration of allergy injections x 2; see eMAR for details.  Tolerated well; left immediately following.  No 15 min OBS.      History of Present Illness    Immunotherapy Pre-Injection Questionnaire:         Are you currently pregnant or been diagnosed with a new medical condition? No    1. Have you had increased asthmas symptoms (chest tightness, increased cough, wheezing, or shortness of breath) in the past week? No    2. Have you had increase allergy symptoms (itching eyes or nose, sneezing, runny nose, post-nasal drip, or throat-clearing) in the past week? No    3. Have you had a cold, respiratory tract infection, or flu-like symptoms in the past two weeks? No    4. Did you have any problems such as increased allergy or asthma symptoms, hives, or generalized itching within 12 hours or receiving your last injection? No    5. Are you on any new medications? New eye drops? No    6. Patient confirmed their name and birth date on allergy vials are correct. Yes       Jo hBandari RN 1/23/2025

## 2025-01-31 ENCOUNTER — OFFICE VISIT (OUTPATIENT)
Dept: INTERNAL MEDICINE | Facility: CLINIC | Age: 70
End: 2025-01-31
Payer: MEDICARE

## 2025-01-31 VITALS
HEART RATE: 56 BPM | BODY MASS INDEX: 33.01 KG/M2 | OXYGEN SATURATION: 97 % | DIASTOLIC BLOOD PRESSURE: 58 MMHG | RESPIRATION RATE: 20 BRPM | HEIGHT: 66 IN | TEMPERATURE: 97.1 F | WEIGHT: 205.4 LBS | SYSTOLIC BLOOD PRESSURE: 122 MMHG

## 2025-01-31 DIAGNOSIS — I10 ESSENTIAL HYPERTENSION: ICD-10-CM

## 2025-01-31 DIAGNOSIS — J30.9 ALLERGIC RHINITIS, UNSPECIFIED SEASONALITY, UNSPECIFIED TRIGGER: ICD-10-CM

## 2025-01-31 DIAGNOSIS — M10.9 GOUT, UNSPECIFIED CAUSE, UNSPECIFIED CHRONICITY, UNSPECIFIED SITE: ICD-10-CM

## 2025-01-31 DIAGNOSIS — E11.9 TYPE 2 DIABETES MELLITUS WITHOUT COMPLICATION, WITHOUT LONG-TERM CURRENT USE OF INSULIN: Primary | ICD-10-CM

## 2025-01-31 DIAGNOSIS — Z00.00 ENCOUNTER FOR ANNUAL WELLNESS VISIT (AWV) IN MEDICARE PATIENT: ICD-10-CM

## 2025-01-31 DIAGNOSIS — Z78.0 POSTMENOPAUSE: ICD-10-CM

## 2025-01-31 LAB
ALBUMIN SERPL-MCNC: 3.8 G/DL (ref 3.5–5.2)
ALBUMIN UR-MCNC: <1.2 MG/DL
ALBUMIN/GLOB SERPL: 1.1 G/DL
ALP SERPL-CCNC: 85 U/L (ref 39–117)
ALT SERPL W P-5'-P-CCNC: 20 U/L (ref 1–33)
ANION GAP SERPL CALCULATED.3IONS-SCNC: 11.9 MMOL/L (ref 5–15)
AST SERPL-CCNC: 28 U/L (ref 1–32)
BASOPHILS # BLD AUTO: 0.05 10*3/MM3 (ref 0–0.2)
BASOPHILS NFR BLD AUTO: 0.7 % (ref 0–1.5)
BILIRUB SERPL-MCNC: <0.2 MG/DL (ref 0–1.2)
BUN SERPL-MCNC: 29 MG/DL (ref 8–23)
BUN/CREAT SERPL: 22.8 (ref 7–25)
CALCIUM SPEC-SCNC: 9.9 MG/DL (ref 8.6–10.5)
CHLORIDE SERPL-SCNC: 104 MMOL/L (ref 98–107)
CHOLEST SERPL-MCNC: 141 MG/DL (ref 0–200)
CO2 SERPL-SCNC: 25.1 MMOL/L (ref 22–29)
CREAT SERPL-MCNC: 1.27 MG/DL (ref 0.57–1)
CREAT UR-MCNC: 100.3 MG/DL
DEPRECATED RDW RBC AUTO: 43.9 FL (ref 37–54)
EGFRCR SERPLBLD CKD-EPI 2021: 46.4 ML/MIN/1.73
EOSINOPHIL # BLD AUTO: 0.18 10*3/MM3 (ref 0–0.4)
EOSINOPHIL NFR BLD AUTO: 2.4 % (ref 0.3–6.2)
ERYTHROCYTE [DISTWIDTH] IN BLOOD BY AUTOMATED COUNT: 13.1 % (ref 12.3–15.4)
GLOBULIN UR ELPH-MCNC: 3.4 GM/DL
GLUCOSE SERPL-MCNC: 77 MG/DL (ref 65–99)
HBA1C MFR BLD: 5.4 % (ref 4.8–5.6)
HCT VFR BLD AUTO: 38.8 % (ref 34–46.6)
HDLC SERPL-MCNC: 50 MG/DL (ref 40–60)
HGB BLD-MCNC: 12.1 G/DL (ref 12–15.9)
IMM GRANULOCYTES # BLD AUTO: 0.01 10*3/MM3 (ref 0–0.05)
IMM GRANULOCYTES NFR BLD AUTO: 0.1 % (ref 0–0.5)
LDLC SERPL CALC-MCNC: 70 MG/DL (ref 0–100)
LDLC/HDLC SERPL: 1.36 {RATIO}
LYMPHOCYTES # BLD AUTO: 2.59 10*3/MM3 (ref 0.7–3.1)
LYMPHOCYTES NFR BLD AUTO: 33.9 % (ref 19.6–45.3)
MCH RBC QN AUTO: 28.5 PG (ref 26.6–33)
MCHC RBC AUTO-ENTMCNC: 31.2 G/DL (ref 31.5–35.7)
MCV RBC AUTO: 91.3 FL (ref 79–97)
MICROALBUMIN/CREAT UR: NORMAL MG/G{CREAT}
MONOCYTES # BLD AUTO: 0.63 10*3/MM3 (ref 0.1–0.9)
MONOCYTES NFR BLD AUTO: 8.3 % (ref 5–12)
NEUTROPHILS NFR BLD AUTO: 4.17 10*3/MM3 (ref 1.7–7)
NEUTROPHILS NFR BLD AUTO: 54.6 % (ref 42.7–76)
NRBC BLD AUTO-RTO: 0 /100 WBC (ref 0–0.2)
PLATELET # BLD AUTO: 406 10*3/MM3 (ref 140–450)
PMV BLD AUTO: 10.6 FL (ref 6–12)
POTASSIUM SERPL-SCNC: 4.1 MMOL/L (ref 3.5–5.2)
PROT SERPL-MCNC: 7.2 G/DL (ref 6–8.5)
RBC # BLD AUTO: 4.25 10*6/MM3 (ref 3.77–5.28)
SODIUM SERPL-SCNC: 141 MMOL/L (ref 136–145)
TRIGL SERPL-MCNC: 116 MG/DL (ref 0–150)
TSH SERPL DL<=0.05 MIU/L-ACNC: 1.71 UIU/ML (ref 0.27–4.2)
URATE SERPL-MCNC: 6.7 MG/DL (ref 2.4–5.7)
VLDLC SERPL-MCNC: 21 MG/DL (ref 5–40)
WBC NRBC COR # BLD AUTO: 7.63 10*3/MM3 (ref 3.4–10.8)

## 2025-01-31 PROCEDURE — 80053 COMPREHEN METABOLIC PANEL: CPT | Performed by: INTERNAL MEDICINE

## 2025-01-31 PROCEDURE — 84443 ASSAY THYROID STIM HORMONE: CPT | Performed by: INTERNAL MEDICINE

## 2025-01-31 PROCEDURE — 99214 OFFICE O/P EST MOD 30 MIN: CPT | Performed by: INTERNAL MEDICINE

## 2025-01-31 PROCEDURE — 3074F SYST BP LT 130 MM HG: CPT | Performed by: INTERNAL MEDICINE

## 2025-01-31 PROCEDURE — 95117 IMMUNOTHERAPY INJECTIONS: CPT | Performed by: INTERNAL MEDICINE

## 2025-01-31 PROCEDURE — 83036 HEMOGLOBIN GLYCOSYLATED A1C: CPT | Performed by: INTERNAL MEDICINE

## 2025-01-31 PROCEDURE — 1170F FXNL STATUS ASSESSED: CPT | Performed by: INTERNAL MEDICINE

## 2025-01-31 PROCEDURE — G0439 PPPS, SUBSEQ VISIT: HCPCS | Performed by: INTERNAL MEDICINE

## 2025-01-31 PROCEDURE — 36415 COLL VENOUS BLD VENIPUNCTURE: CPT | Performed by: INTERNAL MEDICINE

## 2025-01-31 PROCEDURE — 85025 COMPLETE CBC W/AUTO DIFF WBC: CPT | Performed by: INTERNAL MEDICINE

## 2025-01-31 PROCEDURE — 84550 ASSAY OF BLOOD/URIC ACID: CPT | Performed by: INTERNAL MEDICINE

## 2025-01-31 PROCEDURE — 82043 UR ALBUMIN QUANTITATIVE: CPT | Performed by: INTERNAL MEDICINE

## 2025-01-31 PROCEDURE — 82570 ASSAY OF URINE CREATININE: CPT | Performed by: INTERNAL MEDICINE

## 2025-01-31 PROCEDURE — 3078F DIAST BP <80 MM HG: CPT | Performed by: INTERNAL MEDICINE

## 2025-01-31 PROCEDURE — 80061 LIPID PANEL: CPT | Performed by: INTERNAL MEDICINE

## 2025-01-31 PROCEDURE — 1126F AMNT PAIN NOTED NONE PRSNT: CPT | Performed by: INTERNAL MEDICINE

## 2025-01-31 NOTE — PROGRESS NOTES
Subjective   The ABCs of the Annual Wellness Visit  Medicare Wellness Visit      India Gaytan is a 69 y.o. patient who presents for a Medicare Wellness Visit.    The following portions of the patient's history were reviewed and   updated as appropriate: allergies, current medications, past family history, past medical history, past social history, past surgical history, and problem list.    Compared to one year ago, the patient's physical   health is better.  Compared to one year ago, the patient's mental   health is the same.    Recent Hospitalizations:  She was not admitted to the hospital during the last year.     Current Medical Providers:  Patient Care Team:  Lisa Jones MD as PCP - General (Internal Medicine)  Aly Rangel MD as Consulting Physician (Gastroenterology)  Georgia Herrera APRN as Nurse Practitioner (Nurse Practitioner)  Lavonne Mcdowell APRN as Nurse Practitioner (Gastroenterology)  Eliseo Cannon MD as Consulting Physician (General Surgery)    Outpatient Medications Prior to Visit   Medication Sig Dispense Refill    albuterol sulfate  (90 Base) MCG/ACT inhaler Inhale 2 puffs Every 4 (Four) Hours As Needed for Wheezing or Shortness of Air (USES FOR ALLEGERIES).      Allergy Relief 5 MG tablet Take 1 tablet by mouth Daily. 90 tablet 1    allopurinol (Zyloprim) 100 MG tablet Take 1 tablet by mouth Daily. 90 tablet 1    azelastine (ASTELIN) 0.1 % nasal spray SPRAY ONE TO TWO SPRAYS IN each nostril TWICE DAILY      dicyclomine (BENTYL) 10 MG capsule Take 1 capsule by mouth 4 (Four) Times a Day Before Meals & at Bedtime. 30 capsule 1    EPINEPHrine (EPIPEN) 0.3 MG/0.3ML solution auto-injector injection Use as directed for acute allergic reaction.      fenofibrate (TRICOR) 145 MG tablet Take 1 tablet by mouth Daily. 90 tablet 1    fluticasone (FLONASE) 50 MCG/ACT nasal spray SPRAY TWO SPRAY IN each nostril EVERY DAY      furosemide (LASIX) 20 MG tablet Take 1 tablet  by mouth Daily. 90 tablet 1    montelukast (SINGULAIR) 10 MG tablet Take 1 tablet by mouth Every Night. 90 tablet 1    olmesartan-hydrochlorothiazide (BENICAR HCT) 20-12.5 MG per tablet Take 1 tablet by mouth Daily. 90 tablet 1    pantoprazole (PROTONIX) 40 MG EC tablet Take 1 tablet by mouth Daily. 90 tablet 1    Pataday 0.7 % solution Administer 1 drop to both eyes Daily As Needed. NOT USED IN A WHILE      potassium chloride (MICRO-K) 10 MEQ CR capsule Take 1 capsule by mouth 2 (Two) Times a Day. 180 capsule 1    VITAMIN D, ERGOCALCIFEROL, PO Take  by mouth Daily.      Tirzepatide (MOUNJARO) 7.5 MG/0.5ML solution pen-injector pen Inject 0.5 mL under the skin into the appropriate area as directed 1 (One) Time Per Week. 6 mL 1    benzonatate (TESSALON) 100 MG capsule Take 1 capsule by mouth 3 (Three) Times a Day As Needed for Cough. (Patient not taking: Reported on 1/31/2025) 30 capsule 0    colchicine 0.6 MG tablet Take 1 tablet by mouth Daily. (Patient not taking: Reported on 1/31/2025) 3 tablet 0    indomethacin (INDOCIN) 25 MG capsule Take 1 capsule by mouth 3 (Three) Times a Day As Needed for Mild Pain. (Patient not taking: Reported on 1/31/2025) 15 capsule 0    ondansetron (Zofran) 4 MG tablet Take 1 tablet by mouth Every 8 (Eight) Hours As Needed for Nausea or Vomiting. (Patient not taking: Reported on 1/31/2025) 30 tablet 0    promethazine-dextromethorphan (PROMETHAZINE-DM) 6.25-15 MG/5ML syrup Take 5 mL by mouth 4 (Four) Times a Day As Needed for Cough. (Patient not taking: Reported on 1/31/2025) 180 mL 0     Facility-Administered Medications Prior to Visit   Medication Dose Route Frequency Provider Last Rate Last Admin    patient supplied allergy injection  0.5 mL Subcutaneous Q28 Days Lisa Jones MD   0.5 mL at 01/31/25 0924    patient supplied allergy injection  0.5 mL Subcutaneous Q28 Days Lisa Jones MD   0.5 mL at 01/31/25 0924     No opioid medication identified on active medication  "list. I have reviewed chart for other potential  high risk medication/s and harmful drug interactions in the elderly.      Aspirin is not on active medication list.  Aspirin use is not indicated based on review of current medical condition/s. Risk of harm outweighs potential benefits.  .    Patient Active Problem List   Diagnosis    Anemia    Essential hypertension    Gout    Lower extremity edema    Mixed hyperlipidemia    Neuralgia, neuritis or radiculitis    Osteoarthritis of hip    Seasonal allergic rhinitis    Incisional hernia, without obstruction or gangrene    Ventral incisional hernia    Status post hernia repair    Heart murmur    Diverticulosis    Personal history of colonic polyps    Encounter for screening for malignant neoplasm of colon    Type 2 diabetes mellitus without complication, without long-term current use of insulin    Scoliosis    Anterolisthesis of lumbar spine    Seborrheic keratosis    Sebaceous cyst    Vitamin D deficiency    Allergic rhinitis    Acute bronchitis    Chest mass     Advance Care Planning Advance Directive is not on file.  ACP discussion was held with the patient during this visit. Patient has an advance directive (not in EMR), copy requested.            Objective   Vitals:    01/31/25 0913   BP: 122/58   BP Location: Right arm   Patient Position: Sitting   Cuff Size: Large Adult   Pulse: 56   Resp: 20   Temp: 97.1 °F (36.2 °C)   TempSrc: Temporal   SpO2: 97%   Weight: 93.2 kg (205 lb 6.4 oz)   Height: 167.7 cm (66.02\")   PainSc: 0-No pain       Estimated body mass index is 33.13 kg/m² as calculated from the following:    Height as of this encounter: 167.7 cm (66.02\").    Weight as of this encounter: 93.2 kg (205 lb 6.4 oz).                Does the patient have evidence of cognitive impairment? No                                                                                                Health  Risk Assessment    Smoking Status:  Social History     Tobacco Use   Smoking " Status Never    Passive exposure: Never   Smokeless Tobacco Never     Alcohol Consumption:  Social History     Substance and Sexual Activity   Alcohol Use Yes    Comment: Rarely drinks       Fall Risk Screen  STEADI Fall Risk Assessment was completed, and patient is at LOW risk for falls.Assessment completed on:2025    Depression Screening   Little interest or pleasure in doing things? Not at all   Feeling down, depressed, or hopeless? Not at all   PHQ-2 Total Score 0      Health Habits and Functional and Cognitive Screenin/31/2025     9:20 AM   Functional & Cognitive Status   Do you have difficulty preparing food and eating? No   Do you have difficulty bathing yourself, getting dressed or grooming yourself? No   Do you have difficulty using the toilet? No   Do you have difficulty moving around from place to place? No   Do you have trouble with steps or getting out of a bed or a chair? No   Current Diet Well Balanced Diet   Dental Exam Up to date   Eye Exam Up to date   Exercise (times per week) 3 times per week   Current Exercises Include Walking;Other   Do you need help using the phone?  No   Are you deaf or do you have serious difficulty hearing?  No   Do you need help to go to places out of walking distance? No   Do you need help shopping? No   Do you need help preparing meals?  No   Do you need help with housework?  No   Do you need help with laundry? No   Do you need help taking your medications? No   Do you need help managing money? No   Do you ever drive or ride in a car without wearing a seat belt? No   Have you felt unusual stress, anger or loneliness in the last month? No   Who do you live with? Alone   If you need help, do you have trouble finding someone available to you? No   Have you been bothered in the last four weeks by sexual problems? No   Do you have difficulty concentrating, remembering or making decisions? No           Age-appropriate Screening Schedule:  Refer to the list below  for future screening recommendations based on patient's age, sex and/or medical conditions. Orders for these recommended tests are listed in the plan section. The patient has been provided with a written plan.    Health Maintenance List  Health Maintenance   Topic Date Due    DIABETIC FOOT EXAM  08/01/2024    COVID-19 Vaccine (4 - 2024-25 season) 09/01/2024    DXA SCAN  01/16/2025    HEMOGLOBIN A1C  01/31/2025    INFLUENZA VACCINE  03/31/2025 (Originally 7/1/2024)    DIABETIC EYE EXAM  05/10/2025    LIPID PANEL  07/31/2025    BMI FOLLOWUP  07/31/2025    ANNUAL WELLNESS VISIT  01/31/2026    MAMMOGRAM  05/16/2026    TDAP/TD VACCINES (2 - Td or Tdap) 08/01/2026    COLORECTAL CANCER SCREENING  11/08/2027    HEPATITIS C SCREENING  Completed    Pneumococcal Vaccine 65+  Completed    ZOSTER VACCINE  Completed    URINE MICROALBUMIN  Discontinued                                                                                                                                                CMS Preventative Services Quick Reference  Risk Factors Identified During Encounter  None Identified    The above risks/problems have been discussed with the patient.  Pertinent information has been shared with the patient in the After Visit Summary.  An After Visit Summary and PPPS were made available to the patient.    Follow Up:   Next Medicare Wellness visit to be scheduled in 1 year.         Additional E&M Note during same encounter follows:  Patient has additional, significant, and separately identifiable condition(s)/problem(s) that require work above and beyond the Medicare Wellness Visit     Chief Complaint  Medicare Wellness-subsequent, Hypertension, Diabetes (6 mo f/u ), and Hyperlipidemia    Subjective    HPI  India is also being seen today for additional medical problem/s.       The patient presents for a Medicare wellness visit.    She reports improved physical health and stable mental health compared to the previous year. She  "has experienced weight loss due to dietary changes and increased physical activity. She is considering increasing her medication dosage to 10 mg, as she has not experienced any side effects at the current dose of 7.5 mg. She reports no chest pain or respiratory difficulties. Swelling occurs only when she rides the bus. She has no memory concerns and has an advanced directive at home. She performs all self-care tasks independently, including toenail trimming. She has received her COVID-19 vaccines without adverse reactions.    She continues to take allopurinol for gout, which she finds effective. Fish triggers gout flare-ups, and protein drinks also exacerbate her condition.    She is on a daily regimen of potassium and has been taking Benicar for several years without noticeable side effects.    MEDICATIONS  - Allopurinol  - Potassium  - Benicar    IMMUNIZATIONS  - Received initial series of COVID-19 vaccines and a booster          Objective   Vital Signs:  /58 (BP Location: Right arm, Patient Position: Sitting, Cuff Size: Large Adult)   Pulse 56   Temp 97.1 °F (36.2 °C) (Temporal)   Resp 20   Ht 167.7 cm (66.02\")   Wt 93.2 kg (205 lb 6.4 oz)   SpO2 97%   BMI 33.13 kg/m²   Physical Exam      Vital Signs: Blood pressure is normal.            Results                Assessment and Plan        Medicare wellness visit  - Blood pressure is normal  - Commendable weight loss  - Due for a DEXA scan to assess for osteoporosis  - Monitor for side effects if medication dosage is increased to 10 mg  - Dietary modifications may help alleviate mild side effects  - Encouraged to consider receiving the COVID-19 vaccine due to age and diabetic status  - Continue daily potassium intake  - Order DEXA scan  - Conduct blood work today  - Perform annual urine test to check for proteinuria  - Monitor feet for numbness or loss of sensation    Gout  - If symptoms persist, consider increasing allopurinol to 200 mg  - Prefer to " maintain the lowest effective dose if stable  - Order uric acid level test    Diabetes  Well controlled  Cont current meds    Hypertension  - Blood pressure well-controlled on current regimen  - Continue current medications, including Benicar  - Inform about potential GI side effects of Benicar and report any symptoms  - Consider medication switch if GI issues develop            Follow Up   No follow-ups on file.  Patient was given instructions and counseling regarding her condition or for health maintenance advice. Please see specific information pulled into the AVS if appropriate.  Patient or patient representative verbalized consent for the use of Ambient Listening during the visit with  Lisa Jones MD for chart documentation. 1/31/2025  10:13 EST

## 2025-01-31 NOTE — PROGRESS NOTES
Patient came into office this afternoon for administration of allergy injections x 2; see eMAR for details.  Tolerated well; left immediately following.  No 15 min OBS.        History of Present Illness    Immunotherapy Pre-Injection Questionnaire:         Are you currently pregnant or been diagnosed with a new medical condition? No    1. Have you had increased asthmas symptoms (chest tightness, increased cough, wheezing, or shortness of breath) in the past week? No    2. Have you had increase allergy symptoms (itching eyes or nose, sneezing, runny nose, post-nasal drip, or throat-clearing) in the past week? No    3. Have you had a cold, respiratory tract infection, or flu-like symptoms in the past two weeks? No    4. Did you have any problems such as increased allergy or asthma symptoms, hives, or generalized itching within 12 hours or receiving your last injection? No    5. Are you on any new medications? New eye drops? No    6. Patient confirmed their name and birth date on allergy vials are correct. Yes       Jo Bhandari RN 1/31/2025

## 2025-02-03 RX ORDER — ALLOPURINOL 100 MG/1
100 TABLET ORAL DAILY
Qty: 90 TABLET | Refills: 1 | Status: SHIPPED | OUTPATIENT
Start: 2025-02-03

## 2025-02-06 ENCOUNTER — CLINICAL SUPPORT (OUTPATIENT)
Dept: INTERNAL MEDICINE | Facility: CLINIC | Age: 70
End: 2025-02-06
Payer: MEDICARE

## 2025-02-06 DIAGNOSIS — J30.9 ALLERGIC RHINITIS, UNSPECIFIED SEASONALITY, UNSPECIFIED TRIGGER: Primary | ICD-10-CM

## 2025-02-06 PROCEDURE — 95117 IMMUNOTHERAPY INJECTIONS: CPT | Performed by: INTERNAL MEDICINE

## 2025-02-06 RX ORDER — FLUTICASONE PROPIONATE 50 MCG
2 SPRAY, SUSPENSION (ML) NASAL DAILY
Qty: 11.1 G | Refills: 1 | Status: SHIPPED | OUTPATIENT
Start: 2025-02-06

## 2025-02-06 RX ORDER — AZELASTINE 1 MG/ML
2 SPRAY, METERED NASAL 2 TIMES DAILY
Qty: 30 ML | Refills: 1 | Status: SHIPPED | OUTPATIENT
Start: 2025-02-06

## 2025-02-06 RX ORDER — MONTELUKAST SODIUM 10 MG/1
10 TABLET ORAL NIGHTLY
Qty: 90 TABLET | Refills: 1 | Status: SHIPPED | OUTPATIENT
Start: 2025-02-06

## 2025-02-06 RX ORDER — LEVOCETIRIZINE DIHYDROCHLORIDE 5 MG/1
5 TABLET, FILM COATED ORAL DAILY
Qty: 90 TABLET | Refills: 1 | Status: SHIPPED | OUTPATIENT
Start: 2025-02-06

## 2025-02-06 NOTE — TELEPHONE ENCOUNTER
Caller: India Gaytan    Relationship: Self    Best call back number: [phone]    What is the best time to reach you: ANYTIME    Who are you requesting to speak with (clinical staff, provider,  specific staff member): NURSE     What was the call regarding: PATIENT IS CALLING CHECKING ON MEDICATION REFILLS, STATED THAT HER [facility] AND HER MOUNJARO NEEDED TO BE SENT TO [facility] AND SO FAR NEITHER ONE HAS RECEIVED ANYTHING.

## 2025-02-06 NOTE — TELEPHONE ENCOUNTER
Spoke with patient and she stated she is needing all her allergy medications refills, I have pended for you.

## 2025-02-25 ENCOUNTER — TELEPHONE (OUTPATIENT)
Dept: INTERNAL MEDICINE | Facility: CLINIC | Age: 70
End: 2025-02-25
Payer: COMMERCIAL

## 2025-02-25 RX ORDER — COLCHICINE 0.6 MG/1
0.6 TABLET ORAL DAILY
Qty: 10 TABLET | Refills: 0 | Status: SHIPPED | OUTPATIENT
Start: 2025-02-25

## 2025-02-25 NOTE — TELEPHONE ENCOUNTER
Yes I believe it was the colchicine that she is referring to.  I have sent that in.  I sent in 10 pills but usually 3 days worth is plenty and she is going to want to take a little bit of food on her stomach and plenty of water with it.  Tell her to also increase her allopurinol to 200 and start taking 200 once a day.

## 2025-02-25 NOTE — TELEPHONE ENCOUNTER
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   "

## 2025-02-25 NOTE — TELEPHONE ENCOUNTER
Called and spoke with pt, explained to pt medication has been sent to the pharmacy, also explained to pt to increase her medication 200 mg per provider. Pt verbalized understanding and had no further questions at this time

## 2025-03-20 DIAGNOSIS — I10 ESSENTIAL HYPERTENSION: ICD-10-CM

## 2025-03-20 RX ORDER — OLMESARTAN MEDOXOMIL AND HYDROCHLOROTHIAZIDE 20/12.5 20; 12.5 MG/1; MG/1
1 TABLET ORAL DAILY
Qty: 90 TABLET | Refills: 1 | Status: SHIPPED | OUTPATIENT
Start: 2025-03-20

## 2025-03-21 ENCOUNTER — CLINICAL SUPPORT (OUTPATIENT)
Dept: INTERNAL MEDICINE | Facility: CLINIC | Age: 70
End: 2025-03-21
Payer: MEDICARE

## 2025-03-21 DIAGNOSIS — J30.9 ALLERGIC RHINITIS, UNSPECIFIED SEASONALITY, UNSPECIFIED TRIGGER: Primary | ICD-10-CM

## 2025-03-21 PROCEDURE — 95117 IMMUNOTHERAPY INJECTIONS: CPT | Performed by: INTERNAL MEDICINE

## 2025-03-21 NOTE — PROGRESS NOTES
Patient came into office this afternoon for administration of allergy injections x 2; see eMAR for details.  Tolerated well; left immediately following.  No 15 min OBS.      History of Present Illness    Immunotherapy Pre-Injection Questionnaire:         Are you currently pregnant or been diagnosed with a new medical condition? No    1. Have you had increased asthmas symptoms (chest tightness, increased cough, wheezing, or shortness of breath) in the past week? No    2. Have you had increase allergy symptoms (itching eyes or nose, sneezing, runny nose, post-nasal drip, or throat-clearing) in the past week? No    3. Have you had a cold, respiratory tract infection, or flu-like symptoms in the past two weeks? No    4. Did you have any problems such as increased allergy or asthma symptoms, hives, or generalized itching within 12 hours or receiving your last injection? No    5. Are you on any new medications? New eye drops? No    6. Patient confirmed their name and birth date on allergy vials are correct. Yes         Jo Bhandari RN 3/21/2025

## 2025-04-09 RX ORDER — ALLOPURINOL 100 MG/1
100 TABLET ORAL DAILY
Qty: 90 TABLET | Refills: 1 | Status: SHIPPED | OUTPATIENT
Start: 2025-04-09

## 2025-04-09 NOTE — TELEPHONE ENCOUNTER
Caller: India Gaytan    Relationship: Self    Best call back number: [phone]    Requested Prescriptions:   Requested Prescriptions     Pending Prescriptions Disp Refills    allopurinol (ZYLOPRIM) 100 MG tablet 90 tablet 1     Sig: Take 1 tablet by mouth Daily.        Pharmacy where request should be sent: [facility] - [place], [place] - [address] [facility] - [phone]  - [phone] FX     Last office visit with prescribing clinician: 1/31/2025   Last telemedicine visit with prescribing clinician: Visit date not found   Next office visit with prescribing clinician: 7/30/2025     Additional details provided by patient: PATIENT STATES THIS MEDICATION IS SUPPOSED TO BE TAKEN TWICE DAILY NOW AND BECAUSE OF THIS SHE IS NEEDING A NEW PRESCRIPTION SENT TO THE PHARMACY WITH THE NEW DOSAGE ON THERE.     Does the patient have less than a 3 day supply:  [x] Yes  [] No    Would you like a call back once the refill request has been completed: [x] Yes [] No    If the office needs to give you a call back, can they leave a voicemail: [x] Yes [] No    John Velásquez Rep   04/09/25 08:37 EDT

## 2025-04-15 ENCOUNTER — TELEPHONE (OUTPATIENT)
Dept: INTERNAL MEDICINE | Facility: CLINIC | Age: 70
End: 2025-04-15
Payer: COMMERCIAL

## 2025-04-15 NOTE — TELEPHONE ENCOUNTER
Patient called office stating she requesting allopurinol refill, she stated pcp told pt to take 2 tablets daily, patient is needing new prescription with new directions sent to [facility] in [place], atient stated she only have 1 pill left, please advise

## 2025-04-16 RX ORDER — ALLOPURINOL 200 MG/1
200 TABLET ORAL DAILY
Qty: 90 TABLET | Refills: 1 | Status: SHIPPED | OUTPATIENT
Start: 2025-04-16

## 2025-04-17 ENCOUNTER — TELEPHONE (OUTPATIENT)
Dept: INTERNAL MEDICINE | Facility: CLINIC | Age: 70
End: 2025-04-17
Payer: COMMERCIAL

## 2025-04-17 NOTE — TELEPHONE ENCOUNTER
Caller: India Gaytan    Relationship: Self    Best call back number: [phone]     Which medication are you concerned about: FREDY    Who prescribed you this medication: DR. DEAN    What are your concerns: PATIENT WANTING TO KNOW IF DR. DEAN THINKS THAT HER MOUNJARO MEDICATION COULD BE CAUSING HER MUSCLE SORENESS AND GOUT FLARE UP. PATIENT STATES THAT SHE WAS PRESCRIBED A MEDICATION TO HELP WITH GOUT BUT IT IS NOT HELPING. PLEASE ADVISE.

## 2025-04-24 NOTE — TELEPHONE ENCOUNTER
I have not seen that happen but it is possible.  Typically Mounjaro causes dehydration which would make gout worse.  She could try drinking more water and see if that helps.  We could always do a round of steroids if the gout is very bad.

## 2025-04-24 NOTE — TELEPHONE ENCOUNTER
Pt return call to the office, explained to pt provider comments, pt verbalized understanding, pt stated the gout is better and is not needing medication at this time.

## 2025-05-01 ENCOUNTER — CLINICAL SUPPORT (OUTPATIENT)
Dept: INTERNAL MEDICINE | Facility: CLINIC | Age: 70
End: 2025-05-01
Payer: MEDICARE

## 2025-05-01 DIAGNOSIS — J30.9 ALLERGIC RHINITIS, UNSPECIFIED SEASONALITY, UNSPECIFIED TRIGGER: Primary | ICD-10-CM

## 2025-05-01 PROCEDURE — 95117 IMMUNOTHERAPY INJECTIONS: CPT | Performed by: INTERNAL MEDICINE

## 2025-05-01 RX ORDER — COLCHICINE 0.6 MG/1
0.6 TABLET ORAL DAILY
Qty: 10 TABLET | Refills: 0 | Status: SHIPPED | OUTPATIENT
Start: 2025-05-01

## 2025-05-01 NOTE — PROGRESS NOTES
Patient came into office this afternoon for administration of allergy injections x 2; see eMAR for details.  Tolerated well; left immediately following.  No 15 min OBS.      History of Present Illness    Immunotherapy Pre-Injection Questionnaire:         Are you currently pregnant or been diagnosed with a new medical condition? No    1. Have you had increased asthmas symptoms (chest tightness, increased cough, wheezing, or shortness of breath) in the past week? No    2. Have you had increase allergy symptoms (itching eyes or nose, sneezing, runny nose, post-nasal drip, or throat-clearing) in the past week? No    3. Have you had a cold, respiratory tract infection, or flu-like symptoms in the past two weeks? No    4. Did you have any problems such as increased allergy or asthma symptoms, hives, or generalized itching within 12 hours or receiving your last injection? No    5. Are you on any new medications? New eye drops? No    6. Patient confirmed their name and birth date on allergy vials are correct. Yes       Jo Bhandari RN 5/1/2025

## 2025-05-01 NOTE — TELEPHONE ENCOUNTER
Caller: GaytanIndia    Relationship: Self    Best call back number: [phone]     Requested Prescriptions:   Requested Prescriptions     Pending Prescriptions Disp Refills    colchicine 0.6 MG tablet 10 tablet 0     Sig: Take 1 tablet by mouth Daily.        Pharmacy where request should be sent: [facility] - [place], [place] - [address] - [phone]  - [phone] FX     Last office visit with prescribing clinician: 1/31/2025   Last telemedicine visit with prescribing clinician: Visit date not found   Next office visit with prescribing clinician: 7/30/2025     Additional details provided by patient: GOUT FLARE UP     Does the patient have less than a 3 day supply:  [x] Yes  [] No    Would you like a call back once the refill request has been completed: [x] Yes [] No    If the office needs to give you a call back, can they leave a voicemail: [x] Yes [] No    John Gabriel Rep   05/01/25 14:07 EDT

## 2025-05-23 ENCOUNTER — TELEPHONE (OUTPATIENT)
Dept: INTERNAL MEDICINE | Facility: CLINIC | Age: 70
End: 2025-05-23
Payer: COMMERCIAL

## 2025-05-23 NOTE — TELEPHONE ENCOUNTER
Pt called into office this morning stating at the end of June she was leaving the country and going to [place], Pt is wanting to know if PCP would be willing to refill pt's gout medication the colchicine and send in an antibiotic for pt just in case something happens out there.

## 2025-05-23 NOTE — TELEPHONE ENCOUNTER
No problem on the colchicine but what does she mean by the antibiotic?  For a UTI or for traveler's diarrhea or something else?

## 2025-05-29 RX ORDER — COLCHICINE 0.6 MG/1
0.6 TABLET ORAL DAILY
Qty: 10 TABLET | Refills: 0 | Status: SHIPPED | OUTPATIENT
Start: 2025-05-29

## 2025-05-29 NOTE — TELEPHONE ENCOUNTER
Caller: India Gaytan    Relationship: Self    Best call back number:   Telephone Information:   Mobile [phone]        Requested Prescriptions:   Requested Prescriptions     Pending Prescriptions Disp Refills    colchicine 0.6 MG tablet 10 tablet 0     Sig: Take 1 tablet by mouth Daily.        Pharmacy where request should be sent: [facility] - [place], [place] - [address] [facility] - [phone]  - [phone] FX     Last office visit with prescribing clinician: 1/31/2025   Last telemedicine visit with prescribing clinician: Visit date not found   Next office visit with prescribing clinician: 7/30/2025         Does the patient have less than a 3 day supply:  [x] Yes  [] No        Summer John Gotti Rep   05/29/25 09:46 EDT

## 2025-06-04 ENCOUNTER — APPOINTMENT (OUTPATIENT)
Dept: CT IMAGING | Facility: HOSPITAL | Age: 70
End: 2025-06-04
Payer: COMMERCIAL

## 2025-06-04 ENCOUNTER — HOSPITAL ENCOUNTER (EMERGENCY)
Facility: HOSPITAL | Age: 70
Discharge: HOME | End: 2025-06-04
Attending: EMERGENCY MEDICINE | Admitting: EMERGENCY MEDICINE
Payer: COMMERCIAL

## 2025-06-04 VITALS
BODY MASS INDEX: 30.93 KG/M2 | SYSTOLIC BLOOD PRESSURE: 142 MMHG | WEIGHT: 192.46 LBS | HEIGHT: 66 IN | HEART RATE: 79 BPM | RESPIRATION RATE: 18 BRPM | OXYGEN SATURATION: 96 % | TEMPERATURE: 98.1 F | DIASTOLIC BLOOD PRESSURE: 78 MMHG

## 2025-06-04 DIAGNOSIS — K80.80 BILIARY CALCULUS OF OTHER SITE WITHOUT OBSTRUCTION: ICD-10-CM

## 2025-06-04 DIAGNOSIS — K52.9 GASTROENTERITIS: Primary | ICD-10-CM

## 2025-06-04 LAB
ALBUMIN SERPL-MCNC: 4.3 G/DL (ref 3.5–5.2)
ALBUMIN/GLOB SERPL: 1.3 G/DL
ALP SERPL-CCNC: 83 U/L (ref 39–117)
ALT SERPL W P-5'-P-CCNC: 47 U/L (ref 1–33)
ANION GAP SERPL CALCULATED.3IONS-SCNC: 13.6 MMOL/L (ref 5–15)
AST SERPL-CCNC: 92 U/L (ref 1–32)
BASOPHILS # BLD AUTO: 0.03 10*3/MM3 (ref 0–0.2)
BASOPHILS NFR BLD AUTO: 0.3 % (ref 0–1.5)
BILIRUB SERPL-MCNC: 0.6 MG/DL (ref 0–1.2)
BILIRUB UR QL STRIP: NEGATIVE
BUN SERPL-MCNC: 31.4 MG/DL (ref 8–23)
BUN/CREAT SERPL: 30.8 (ref 7–25)
CALCIUM SPEC-SCNC: 9.8 MG/DL (ref 8.6–10.5)
CHLORIDE SERPL-SCNC: 102 MMOL/L (ref 98–107)
CLARITY UR: ABNORMAL
CO2 SERPL-SCNC: 23.4 MMOL/L (ref 22–29)
COLOR UR: YELLOW
CREAT SERPL-MCNC: 1.02 MG/DL (ref 0.57–1)
D-LACTATE SERPL-SCNC: 1.3 MMOL/L (ref 0.5–2)
DEPRECATED RDW RBC AUTO: 49.3 FL (ref 37–54)
EGFRCR SERPLBLD CKD-EPI 2021: 60.4 ML/MIN/1.73
EOSINOPHIL # BLD AUTO: 0.01 10*3/MM3 (ref 0–0.4)
EOSINOPHIL NFR BLD AUTO: 0.1 % (ref 0.3–6.2)
ERYTHROCYTE [DISTWIDTH] IN BLOOD BY AUTOMATED COUNT: 14.9 % (ref 12.3–15.4)
GEN 5 1HR TROPONIN T REFLEX: 23 NG/L
GLOBULIN UR ELPH-MCNC: 3.4 GM/DL
GLUCOSE SERPL-MCNC: 126 MG/DL (ref 65–99)
GLUCOSE UR STRIP-MCNC: NEGATIVE MG/DL
HCT VFR BLD AUTO: 38.7 % (ref 34–46.6)
HGB BLD-MCNC: 12.5 G/DL (ref 12–15.9)
HGB UR QL STRIP.AUTO: NEGATIVE
HOLD SPECIMEN: NORMAL
HOLD SPECIMEN: NORMAL
IMM GRANULOCYTES # BLD AUTO: 0.04 10*3/MM3 (ref 0–0.05)
IMM GRANULOCYTES NFR BLD AUTO: 0.4 % (ref 0–0.5)
KETONES UR QL STRIP: NEGATIVE
LEUKOCYTE ESTERASE UR QL STRIP.AUTO: NEGATIVE
LIPASE SERPL-CCNC: 253 U/L (ref 13–60)
LYMPHOCYTES # BLD AUTO: 0.97 10*3/MM3 (ref 0.7–3.1)
LYMPHOCYTES NFR BLD AUTO: 8.5 % (ref 19.6–45.3)
MCH RBC QN AUTO: 28.9 PG (ref 26.6–33)
MCHC RBC AUTO-ENTMCNC: 32.3 G/DL (ref 31.5–35.7)
MCV RBC AUTO: 89.6 FL (ref 79–97)
MONOCYTES # BLD AUTO: 0.2 10*3/MM3 (ref 0.1–0.9)
MONOCYTES NFR BLD AUTO: 1.8 % (ref 5–12)
NEUTROPHILS NFR BLD AUTO: 10.17 10*3/MM3 (ref 1.7–7)
NEUTROPHILS NFR BLD AUTO: 88.9 % (ref 42.7–76)
NITRITE UR QL STRIP: NEGATIVE
NRBC BLD AUTO-RTO: 0 /100 WBC (ref 0–0.2)
PH UR STRIP.AUTO: 7.5 [PH] (ref 5–8)
PLATELET # BLD AUTO: 434 10*3/MM3 (ref 140–450)
PMV BLD AUTO: 10.2 FL (ref 6–12)
POTASSIUM SERPL-SCNC: 3.9 MMOL/L (ref 3.5–5.2)
PROT SERPL-MCNC: 7.7 G/DL (ref 6–8.5)
PROT UR QL STRIP: NEGATIVE
QT INTERVAL: 394 MS
QTC INTERVAL: 423 MS
RBC # BLD AUTO: 4.32 10*6/MM3 (ref 3.77–5.28)
SODIUM SERPL-SCNC: 139 MMOL/L (ref 136–145)
SP GR UR STRIP: 1.02 (ref 1–1.03)
TROPONIN T % DELTA: 44
TROPONIN T NUMERIC DELTA: 7 NG/L
TROPONIN T SERPL HS-MCNC: 16 NG/L
UROBILINOGEN UR QL STRIP: ABNORMAL
WBC NRBC COR # BLD AUTO: 11.42 10*3/MM3 (ref 3.4–10.8)
WHOLE BLOOD HOLD COAG: NORMAL
WHOLE BLOOD HOLD SPECIMEN: NORMAL

## 2025-06-04 PROCEDURE — 96375 TX/PRO/DX INJ NEW DRUG ADDON: CPT

## 2025-06-04 PROCEDURE — 84484 ASSAY OF TROPONIN QUANT: CPT | Performed by: EMERGENCY MEDICINE

## 2025-06-04 PROCEDURE — 25010000002 KETOROLAC TROMETHAMINE PER 15 MG: Performed by: EMERGENCY MEDICINE

## 2025-06-04 PROCEDURE — 83690 ASSAY OF LIPASE: CPT | Performed by: EMERGENCY MEDICINE

## 2025-06-04 PROCEDURE — 25810000003 SODIUM CHLORIDE 0.9 % SOLUTION: Performed by: EMERGENCY MEDICINE

## 2025-06-04 PROCEDURE — 81003 URINALYSIS AUTO W/O SCOPE: CPT

## 2025-06-04 PROCEDURE — 93005 ELECTROCARDIOGRAM TRACING: CPT

## 2025-06-04 PROCEDURE — 96374 THER/PROPH/DIAG INJ IV PUSH: CPT

## 2025-06-04 PROCEDURE — 99285 EMERGENCY DEPT VISIT HI MDM: CPT

## 2025-06-04 PROCEDURE — 25010000002 MORPHINE PER 10 MG: Performed by: EMERGENCY MEDICINE

## 2025-06-04 PROCEDURE — 74177 CT ABD & PELVIS W/CONTRAST: CPT

## 2025-06-04 PROCEDURE — 36415 COLL VENOUS BLD VENIPUNCTURE: CPT

## 2025-06-04 PROCEDURE — 25510000001 IOPAMIDOL PER 1 ML: Performed by: EMERGENCY MEDICINE

## 2025-06-04 PROCEDURE — 93005 ELECTROCARDIOGRAM TRACING: CPT | Performed by: EMERGENCY MEDICINE

## 2025-06-04 PROCEDURE — 85025 COMPLETE CBC W/AUTO DIFF WBC: CPT | Performed by: EMERGENCY MEDICINE

## 2025-06-04 PROCEDURE — 80053 COMPREHEN METABOLIC PANEL: CPT | Performed by: EMERGENCY MEDICINE

## 2025-06-04 PROCEDURE — 83605 ASSAY OF LACTIC ACID: CPT | Performed by: EMERGENCY MEDICINE

## 2025-06-04 PROCEDURE — 25010000002 ONDANSETRON PER 1 MG: Performed by: EMERGENCY MEDICINE

## 2025-06-04 RX ORDER — IOPAMIDOL 755 MG/ML
100 INJECTION, SOLUTION INTRAVASCULAR
Status: DISCONTINUED | OUTPATIENT
Start: 2025-06-04 | End: 2025-06-04 | Stop reason: HOSPADM

## 2025-06-04 RX ORDER — ONDANSETRON 4 MG/1
4 TABLET, ORALLY DISINTEGRATING ORAL EVERY 8 HOURS PRN
Qty: 15 TABLET | Refills: 0 | Status: SHIPPED | OUTPATIENT
Start: 2025-06-04 | End: 2025-06-09

## 2025-06-04 RX ORDER — IOPAMIDOL 755 MG/ML
100 INJECTION, SOLUTION INTRAVASCULAR
Status: COMPLETED | OUTPATIENT
Start: 2025-06-04 | End: 2025-06-04

## 2025-06-04 RX ORDER — ONDANSETRON 2 MG/ML
4 INJECTION INTRAMUSCULAR; INTRAVENOUS ONCE
Status: COMPLETED | OUTPATIENT
Start: 2025-06-04 | End: 2025-06-04

## 2025-06-04 RX ORDER — SODIUM CHLORIDE 0.9 % (FLUSH) 0.9 %
10 SYRINGE (ML) INJECTION AS NEEDED
Status: DISCONTINUED | OUTPATIENT
Start: 2025-06-04 | End: 2025-06-04 | Stop reason: HOSPADM

## 2025-06-04 RX ORDER — KETOROLAC TROMETHAMINE 15 MG/ML
15 INJECTION, SOLUTION INTRAMUSCULAR; INTRAVENOUS ONCE
Status: COMPLETED | OUTPATIENT
Start: 2025-06-04 | End: 2025-06-04

## 2025-06-04 RX ORDER — DICYCLOMINE HCL 20 MG
20 TABLET ORAL EVERY 8 HOURS PRN
Qty: 15 TABLET | Refills: 0 | Status: SHIPPED | OUTPATIENT
Start: 2025-06-04 | End: 2025-06-09

## 2025-06-04 RX ADMIN — MORPHINE SULFATE 4 MG: 4 INJECTION, SOLUTION INTRAMUSCULAR; INTRAVENOUS at 12:16

## 2025-06-04 RX ADMIN — KETOROLAC TROMETHAMINE 15 MG: 15 INJECTION, SOLUTION INTRAMUSCULAR; INTRAVENOUS at 12:16

## 2025-06-04 RX ADMIN — IOPAMIDOL 100 ML: 755 INJECTION, SOLUTION INTRAVENOUS at 11:51

## 2025-06-04 RX ADMIN — SODIUM CHLORIDE 1000 ML: 9 INJECTION, SOLUTION INTRAVENOUS at 12:16

## 2025-06-04 RX ADMIN — ONDANSETRON 4 MG: 2 INJECTION INTRAMUSCULAR; INTRAVENOUS at 12:16

## 2025-06-04 NOTE — Clinical Note
[facility] EMERGENCY ROOM  [address] [place] [address]  Phone: [phone]  Fax: [phone]    India Gaytan was seen and treated in our emergency department on 6/4/2025.  She may return to work on 06/05/2025.         Thank you for choosing [facility].    Milan Garrett, DO

## 2025-06-04 NOTE — ED PROVIDER NOTES
"SHARED VISIT ATTESTATION:    This visit was performed by myself and an APC.  I personally approved the management plan/medical decision making and take responsibility for the patient management.      SHARED VISIT NOTE:    Patient is 69 y.o. year old female that presents to the ED for evaluation of abdominal pain, nausea and vomiting with a history cholelithiasis        ED Course:    /78 (BP Location: Right arm, Patient Position: Lying)   Pulse 79   Temp 98.1 °F (36.7 °C) (Oral)   Resp 18   Ht 167.6 cm (66\")   Wt 87.3 kg (192 lb 7.4 oz)   LMP  (LMP Unknown)   SpO2 96%   BMI 31.06 kg/m²       The following orders were placed and all results were independently analyzed by me:  Orders Placed This Encounter   Procedures    CT Abdomen Pelvis With Contrast    [place] Draw    Comprehensive Metabolic Panel    Lipase    Urinalysis With Microscopic If Indicated (No Culture) - Urine, Clean Catch    Lactic Acid, Plasma    CBC Auto Differential    High Sensitivity Troponin T    High Sensitivity Troponin T 1Hr    Ambulatory Referral to General Surgery    Undress & Gown    ECG 12 Lead Other; Upper Abdominal Pain    CBC & Differential    Green Top (Gel)    Lavender Top    Gold Top - SST    Light Blue Top       Medications Given in the Emergency Department:  Medications   ondansetron (ZOFRAN) injection 4 mg (4 mg Intravenous Given 6/4/25 1216)   morphine injection 4 mg (4 mg Intravenous Given 6/4/25 1216)   ketorolac (TORADOL) injection 15 mg (15 mg Intravenous Given 6/4/25 1216)   sodium chloride 0.9 % bolus 1,000 mL (0 mL Intravenous Stopped 6/4/25 1334)   iopamidol (ISOVUE-370) 76 % injection 100 mL (100 mL Intravenous Given 6/4/25 1151)        ED Course:         Labs:    Lab Results (last 24 hours)       Procedure Component Value Units Date/Time    Urinalysis With Microscopic If Indicated (No Culture) - Urine, Clean Catch [[id number]]  (Abnormal) Collected: 06/04/25 1049    Specimen: Urine, Clean Catch Updated: " 06/04/25 1108     Color, UA Yellow     Appearance, UA Cloudy     pH, UA 7.5     Specific Gravity, UA 1.018     Glucose, UA Negative     Ketones, UA Negative     Bilirubin, UA Negative     Blood, UA Negative     Protein, UA Negative     Leuk Esterase, UA Negative     Nitrite, UA Negative     Urobilinogen, UA 0.2 E.U./dL    Narrative:      Urine microscopic not indicated.    Lactic Acid, Plasma [[id number]]  (Normal) Collected: 06/04/25 1103    Specimen: Blood Updated: 06/04/25 1129     Lactate 1.3 mmol/L     Comprehensive Metabolic Panel [[id number]]  (Abnormal) Collected: 06/04/25 1104    Specimen: Blood Updated: 06/04/25 1136     Glucose 126 mg/dL      BUN 31.4 mg/dL      Creatinine 1.02 mg/dL      Sodium 139 mmol/L      Potassium 3.9 mmol/L      Comment: Slight hemolysis detected by analyzer. Result may be falsely elevated.        Chloride 102 mmol/L      CO2 23.4 mmol/L      Calcium 9.8 mg/dL      Total Protein 7.7 g/dL      Albumin 4.3 g/dL      ALT (SGPT) 47 U/L      AST (SGOT) 92 U/L      Comment: Slight hemolysis detected by analyzer. Result may be falsely elevated.        Alkaline Phosphatase 83 U/L      Total Bilirubin 0.6 mg/dL      Globulin 3.4 gm/dL      A/G Ratio 1.3 g/dL      BUN/Creatinine Ratio 30.8     Anion Gap 13.6 mmol/L      eGFR 60.4 mL/min/1.73     Narrative:      GFR Categories in Chronic Kidney Disease (CKD)              GFR Category          GFR (mL/min/1.73)    Interpretation  G1                    90 or greater        Normal or high (1)  G2                    60-89                Mild decrease (1)  G3a                   45-59                Mild to moderate decrease  G3b                   30-44                Moderate to severe decrease  G4                    15-29                Severe decrease  G5                    14 or less           Kidney failure    (1)In the absence of evidence of kidney disease, neither GFR category G1 or G2 fulfill the criteria for CKD.    eGFR calculation 2021  CKD-EPI creatinine equation, which does not include race as a factor    Lipase [[id number]]  (Abnormal) Collected: 06/04/25 1104    Specimen: Blood Updated: 06/04/25 1130     Lipase 253 U/L     High Sensitivity Troponin T [[id number]]  (Abnormal) Collected: 06/04/25 1104    Specimen: Blood Updated: 06/04/25 1130     HS Troponin T 16 ng/L     Narrative:      High Sensitive Troponin T Reference Range:  <14.0 ng/L- Negative Female for AMI  <22.0 ng/L- Negative Male for AMI  >=14 - Abnormal Female indicating possible myocardial injury.  >=22 - Abnormal Male indicating possible myocardial injury.   Clinicians would have to utilize clinical acumen, EKG, Troponin, and serial changes to determine if it is an Acute Myocardial Infarction or myocardial injury due to an underlying chronic condition.         CBC & Differential [[id number]]  (Abnormal) Collected: 06/04/25 1109    Specimen: Blood Updated: 06/04/25 1311    Narrative:      The following orders were created for panel order CBC & Differential.  Procedure                               Abnormality         Status                     ---------                               -----------         ------                     CBC Auto Differential[[id number]]        Abnormal            Final result                 Please view results for these tests on the individual orders.    CBC Auto Differential [[id number]]  (Abnormal) Collected: 06/04/25 1109    Specimen: Blood Updated: 06/04/25 1311     WBC 11.42 10*3/mm3      RBC 4.32 10*6/mm3      Hemoglobin 12.5 g/dL      Hematocrit 38.7 %      MCV 89.6 fL      MCH 28.9 pg      MCHC 32.3 g/dL      RDW 14.9 %      RDW-SD 49.3 fl      MPV 10.2 fL      Platelets 434 10*3/mm3      Neutrophil % 88.9 %      Lymphocyte % 8.5 %      Monocyte % 1.8 %      Eosinophil % 0.1 %      Basophil % 0.3 %      Immature Grans % 0.4 %      Neutrophils, Absolute 10.17 10*3/mm3      Lymphocytes, Absolute 0.97 10*3/mm3      Monocytes, Absolute 0.20 10*3/mm3       Eosinophils, Absolute 0.01 10*3/mm3      Basophils, Absolute 0.03 10*3/mm3      Immature Grans, Absolute 0.04 10*3/mm3      nRBC 0.0 /100 WBC     High Sensitivity Troponin T 1Hr [[id number]]  (Abnormal) Collected: 06/04/25 1244    Specimen: Blood Updated: 06/04/25 1315     HS Troponin T 23 ng/L      Troponin T Numeric Delta 7 ng/L      Troponin T % Delta 44    Narrative:      High Sensitive Troponin T Reference Range:  <14.0 ng/L- Negative Female for AMI  <22.0 ng/L- Negative Male for AMI  >=14 - Abnormal Female indicating possible myocardial injury.  >=22 - Abnormal Male indicating possible myocardial injury.   Clinicians would have to utilize clinical acumen, EKG, Troponin, and serial changes to determine if it is an Acute Myocardial Infarction or myocardial injury due to an underlying chronic condition.                  Imaging:    CT Abdomen Pelvis With Contrast  Result Date: 6/4/2025  CT ABDOMEN PELVIS W CONTRAST Date of Exam: 6/4/2025 12:02 PM EDT Indication: epigastric abd pain with vomiting. Comparison: CT of the abdomen and pelvis dated 8/16/2021 Technique: Axial CT images were obtained of the abdomen and pelvis after the uneventful intravenous administration of iodinated contrast. Reconstructed coronal and sagittal images were also obtained. Automated exposure control and iterative construction methods were used. Findings: Liver: The liver is unremarkable in morphology and decreased in attenuation. No focal liver lesion is seen. No biliary dilation is seen. Gallbladder: Cholelithiasis Pancreas: Unremarkable. Spleen: Several splenic granulomas. Adrenal glands: Unremarkable. Genitourinary tract: Small left renal cyst. Additional subcentimeter renal hypodensities which are too small to characterize. No hydronephrosis is seen. The visualized portions of the ureters and urinary bladder appear unremarkable. Status post hysterectomy. Gastrointestinal tract: Colonic diverticulosis. Surgical changes of the  rectosigmoid colon. Hollow viscera appear otherwise unremarkable. There is no evidence of bowel obstruction. Appendix: No findings to suggest acute appendicitis. Other findings: No free air or free fluid is identified. No pathologically enlarged lymph nodes are seen. The abdominal aorta and IVC appear unremarkable. Bones and soft tissues: No acute osseous lesion is identified. There are degenerative changes within the spine. There is grade 1 anterolisthesis at L4-L5. Status post kyphoplasty of a compression fracture of T11. Superficial soft tissues demonstrate no acute abnormality. Postsurgical changes of ventral abdominal wall hernia repair. Lung bases: The visualized lung bases are clear.     Impression: 1.No acute abnormality identified within the abdomen or pelvis. 2.Hepatic steatosis. 3.Cholelithiasis. 4.Colonic diverticulosis with surgical changes of the rectosigmoid colon. 5.Additional findings as detailed above. Electronically Signed: Darrell Arce MD  6/4/2025 12:30 PM EDT  Workstation ID: [id number]      MDM:    Procedures                         Milan Garrett DO  17:39 EDT  06/04/25         Milan Garrett DO  06/04/25 1739

## 2025-06-04 NOTE — ED PROVIDER NOTES
Time: 1:23 PM EDT  Date of encounter:  6/4/2025  Independent Historian/Clinical History and Information was obtained by:   Patient    History is limited by: N/A    Chief Complaint: abdominal pain       History of Present Illness:  Patient is a 69 y.o. year old female who presents to the emergency department for evaluation of epigastric abdominal pain with associated nausea/vomiting that began this morning.  Patient denies chest pain shortness of breath.  Patient denies recent travel or new medications.      Patient Care Team  Primary Care Provider: Lisa Jones MD    Past Medical History:     Allergies   Allergen Reactions    Levofloxacin Nausea And Vomiting     Pt reported also caused her to pass out      Past Medical History:   Diagnosis Date    Anemia     ASYMPTOMATIC    Anesthesia     SLOW TO WAKE UP POSTOP     Benign essential hypertension     Chronic allergic rhinitis     Essential hypertension     Gout 05/30/2019    greatly improved    High cholesterol     Hyperlipidemia     Lower extremity edema 09/18/2017    cont compression stockings can do lymphedema therapy if she wishes in the future    Lumbar back pain with radiculopathy affecting right lower extremity 06/12/2015    Mixed hyperlipidemia     Osteoarthritis of right hip 06/12/2015    Pain of right hip joint 09/18/2017    has chronic arthritis of that hip with tendonitits seen previously in MRI however she doesn't wish to do pt at this time, will try stretches that she has done in the past at home currently    PONV (postoperative nausea and vomiting)     Seasonal allergies     Ventral hernia CURRENTLY     Past Surgical History:   Procedure Laterality Date    ABDOMINAL HYSTERECTOMY      BACK SURGERY      BREAST CYST EXCISION      COLON RESECTION  2020    DR RAMIRES    COLON SURGERY      RELATED TO ABSCESS    COLONOSCOPY  2006, 2019, 2020    COLONOSCOPY N/A 11/08/2022    Procedure: COLONOSCOPY;  Surgeon: Aly Rangel MD;  Location: [facility]  ENDOSCOPY;  Service: Gastroenterology;  Laterality: N/A;  DIVERTICULOSIS, ANASTOMOSIS AT 15CM    ENDOSCOPY N/A 11/08/2022    Procedure: ESOPHAGOGASTRODUODENOSCOPY WITH BX;  Surgeon: Aly Rangel MD;  Location: [facility] ENDOSCOPY;  Service: Gastroenterology;  Laterality: N/A;  HIATAL HERNIA    EXCISION LESION N/A 06/13/2022    Procedure: EXCISION BACK MASS;  Surgeon: Eliseo Cannon MD;  Location: [facility] MAIN OR;  Service: General;  Laterality: N/A;    EXCISION LESION N/A 08/16/2024    Procedure: CHEST WALL MASS EXCISION;  Surgeon: Eliseo Cannon MD;  Location: [facility] MAIN OR;  Service: General;  Laterality: N/A;    HERNIA REPAIR  12/2020    VENTRAL HERNIA     HYSTERECTOMY  1996    OOPHORECTOMY  1999    ROTATOR CUFF REPAIR Left 2011    VENTRAL HERNIA REPAIR N/A 08/02/2021    Procedure: VENTRAL HERNIA REPAIR LAPAROSCOPIC WITH DAVINCI ROBOT;  Surgeon: Eliseo Cannon MD;  Location: [facility] MAIN OR;  Service: Robotics - DaVinci;  Laterality: N/A;    VERTEBROPLASTY  08/07/2020    T11     Family History   Problem Relation Age of Onset    Heart disease Mother     Heart disease Father     Colon cancer Neg Hx     Malig Hyperthermia Neg Hx        Home Medications:  Prior to Admission medications    Medication Sig Start Date End Date Taking? Authorizing Provider   albuterol sulfate  (90 Base) MCG/ACT inhaler Inhale 2 puffs Every 4 (Four) Hours As Needed for Wheezing or Shortness of Air (USES FOR ALLEGERIES). 5/19/21   Provider, MD Anderson   Allergy Relief 5 MG tablet Take 1 tablet by mouth Daily. 2/6/25   Lisa Jones MD   allopurinol 200 MG tablet Take 200 mg by mouth Daily. 4/16/25   Lisa Jones MD   azelastine (ASTELIN) 0.1 % nasal spray Administer 2 sprays into the nostril(s) as directed by provider 2 (Two) Times a Day. Use in each nostril as directed 2/6/25   Lisa Jones MD   colchicine 0.6 MG tablet Take 1 tablet by mouth Daily. 5/29/25   Lisa Jones MD    dicyclomine (BENTYL) 10 MG capsule Take 1 capsule by mouth 4 (Four) Times a Day Before Meals & at Bedtime. 8/1/23   Lisa Jones MD   EPINEPHrine (EPIPEN) 0.3 MG/0.3ML solution auto-injector injection Use as directed for acute allergic reaction. 3/29/22   Anderson Garcia MD   fenofibrate (TRICOR) 145 MG tablet Take 1 tablet by mouth Daily. 12/17/24   Lisa Jones MD   fluticasone (FLONASE) 50 MCG/ACT nasal spray Administer 2 sprays into the nostril(s) as directed by provider Daily. 2/6/25   Lisa Jones MD   furosemide (LASIX) 20 MG tablet Take 1 tablet by mouth Daily. 12/17/24   Lisa Jones MD   montelukast (SINGULAIR) 10 MG tablet Take 1 tablet by mouth Every Night. 2/6/25   Lisa Jones MD   olmesartan-hydrochlorothiazide (BENICAR HCT) 20-12.5 MG per tablet TAKE ONE TABLET BY MOUTH EVERY DAY 3/20/25   Lisa Jones MD   pantoprazole (PROTONIX) 40 MG EC tablet Take 1 tablet by mouth Daily. 12/16/24   Lisa Jones MD   Pataday 0.7 % solution Administer 1 drop to both eyes Daily As Needed. NOT USED IN A WHILE 5/19/21   Anderson Garcia MD   potassium chloride (MICRO-K) 10 MEQ CR capsule Take 1 capsule by mouth 2 (Two) Times a Day. 12/17/24   Lisa Jones MD   Tirzepatide 10 MG/0.5ML solution auto-injector Inject 10 mg under the skin into the appropriate area as directed 1 (One) Time Per Week. 2/6/25   Lisa Jones MD   VITAMIN D, ERGOCALCIFEROL, PO Take  by mouth Daily.    Anderson Garcia MD        Social History:   Social History     Tobacco Use    Smoking status: Never     Passive exposure: Never    Smokeless tobacco: Never   Vaping Use    Vaping status: Never Used   Substance Use Topics    Alcohol use: Yes     Comment: Rarely drinks    Drug use: Never         Review of Systems:  Review of Systems   Constitutional:  Negative for chills and fever.   HENT:  Negative for congestion, rhinorrhea and sore throat.    Eyes:  Negative for  "pain and visual disturbance.   Respiratory:  Negative for apnea, cough, chest tightness and shortness of breath.    Cardiovascular:  Negative for chest pain and palpitations.   Gastrointestinal:  Positive for abdominal pain, nausea and vomiting. Negative for diarrhea.   Genitourinary:  Negative for difficulty urinating and dysuria.   Musculoskeletal:  Negative for joint swelling and myalgias.   Skin:  Negative for color change.   Neurological:  Negative for seizures and headaches.   Psychiatric/Behavioral: Negative.     All other systems reviewed and are negative.       Physical Exam:  /80   Pulse 72   Temp 98.3 °F (36.8 °C) (Oral)   Resp 18   Ht 167.6 cm (66\")   Wt 87.3 kg (192 lb 7.4 oz)   LMP  (LMP Unknown)   SpO2 99%   BMI 31.06 kg/m²     Physical Exam  Vitals and nursing note reviewed.   Constitutional:       General: She is not in acute distress.     Appearance: Normal appearance. She is not toxic-appearing.   HENT:      Head: Normocephalic and atraumatic.      Jaw: There is normal jaw occlusion.   Eyes:      General: Lids are normal.      Extraocular Movements: Extraocular movements intact.      Conjunctiva/sclera: Conjunctivae normal.      Pupils: Pupils are equal, round, and reactive to light.   Cardiovascular:      Rate and Rhythm: Normal rate and regular rhythm.      Pulses: Normal pulses.      Heart sounds: Normal heart sounds.   Pulmonary:      Effort: Pulmonary effort is normal. No respiratory distress.      Breath sounds: Normal breath sounds. No wheezing or rhonchi.   Abdominal:      General: Abdomen is flat.      Palpations: Abdomen is soft.      Tenderness: There is abdominal tenderness in the epigastric area. There is no guarding or rebound.   Musculoskeletal:         General: Normal range of motion.      Cervical back: Normal range of motion and neck supple.      Right lower leg: No edema.      Left lower leg: No edema.   Skin:     General: Skin is warm and dry.   Neurological:      " Mental Status: She is alert and oriented to person, place, and time. Mental status is at baseline.   Psychiatric:         Mood and Affect: Mood normal.                    Medical Decision Making:      Comorbidities that affect care:    Hypertension, hyperlipidemia    External Notes reviewed:          The following orders were placed and all results were independently analyzed by me:  Orders Placed This Encounter   Procedures    CT Abdomen Pelvis With Contrast    Rice Draw    Comprehensive Metabolic Panel    Lipase    Urinalysis With Microscopic If Indicated (No Culture) - Urine, Clean Catch    Lactic Acid, Plasma    CBC Auto Differential    High Sensitivity Troponin T    High Sensitivity Troponin T 1Hr    Ambulatory Referral to General Surgery    NPO Diet NPO Type: Strict NPO    Undress & Gown    ECG 12 Lead Other; Upper Abdominal Pain    Insert Peripheral IV    CBC & Differential    Green Top (Gel)    Lavender Top    Gold Top - SST    Light Blue Top       Medications Given in the Emergency Department:  Medications   sodium chloride 0.9 % flush 10 mL (has no administration in time range)   iopamidol (ISOVUE-370) 76 % injection 100 mL (100 mL Intravenous Not Given 6/4/25 1202)   ondansetron (ZOFRAN) injection 4 mg (4 mg Intravenous Given 6/4/25 1216)   morphine injection 4 mg (4 mg Intravenous Given 6/4/25 1216)   ketorolac (TORADOL) injection 15 mg (15 mg Intravenous Given 6/4/25 1216)   sodium chloride 0.9 % bolus 1,000 mL (1,000 mL Intravenous New Bag 6/4/25 1216)   iopamidol (ISOVUE-370) 76 % injection 100 mL (100 mL Intravenous Given 6/4/25 1151)        ED Course:         Labs:    Lab Results (last 24 hours)       Procedure Component Value Units Date/Time    Urinalysis With Microscopic If Indicated (No Culture) - Urine, Clean Catch [[id number]]  (Abnormal) Collected: 06/04/25 1049    Specimen: Urine, Clean Catch Updated: 06/04/25 1108     Color, UA Yellow     Appearance, UA Cloudy     pH, UA 7.5     Specific  Gravity, UA 1.018     Glucose, UA Negative     Ketones, UA Negative     Bilirubin, UA Negative     Blood, UA Negative     Protein, UA Negative     Leuk Esterase, UA Negative     Nitrite, UA Negative     Urobilinogen, UA 0.2 E.U./dL    Narrative:      Urine microscopic not indicated.    Lactic Acid, Plasma [[id number]]  (Normal) Collected: 06/04/25 1103    Specimen: Blood Updated: 06/04/25 1129     Lactate 1.3 mmol/L     Comprehensive Metabolic Panel [[id number]]  (Abnormal) Collected: 06/04/25 1104    Specimen: Blood Updated: 06/04/25 1136     Glucose 126 mg/dL      BUN 31.4 mg/dL      Creatinine 1.02 mg/dL      Sodium 139 mmol/L      Potassium 3.9 mmol/L      Comment: Slight hemolysis detected by analyzer. Result may be falsely elevated.        Chloride 102 mmol/L      CO2 23.4 mmol/L      Calcium 9.8 mg/dL      Total Protein 7.7 g/dL      Albumin 4.3 g/dL      ALT (SGPT) 47 U/L      AST (SGOT) 92 U/L      Comment: Slight hemolysis detected by analyzer. Result may be falsely elevated.        Alkaline Phosphatase 83 U/L      Total Bilirubin 0.6 mg/dL      Globulin 3.4 gm/dL      A/G Ratio 1.3 g/dL      BUN/Creatinine Ratio 30.8     Anion Gap 13.6 mmol/L      eGFR 60.4 mL/min/1.73     Narrative:      GFR Categories in Chronic Kidney Disease (CKD)              GFR Category          GFR (mL/min/1.73)    Interpretation  G1                    90 or greater        Normal or high (1)  G2                    60-89                Mild decrease (1)  G3a                   45-59                Mild to moderate decrease  G3b                   30-44                Moderate to severe decrease  G4                    15-29                Severe decrease  G5                    14 or less           Kidney failure    (1)In the absence of evidence of kidney disease, neither GFR category G1 or G2 fulfill the criteria for CKD.    eGFR calculation 2021 CKD-EPI creatinine equation, which does not include race as a factor    Lipase [[id number]]   (Abnormal) Collected: 06/04/25 1104    Specimen: Blood Updated: 06/04/25 1130     Lipase 253 U/L     High Sensitivity Troponin T [[id number]]  (Abnormal) Collected: 06/04/25 1104    Specimen: Blood Updated: 06/04/25 1130     HS Troponin T 16 ng/L     Narrative:      High Sensitive Troponin T Reference Range:  <14.0 ng/L- Negative Female for AMI  <22.0 ng/L- Negative Male for AMI  >=14 - Abnormal Female indicating possible myocardial injury.  >=22 - Abnormal Male indicating possible myocardial injury.   Clinicians would have to utilize clinical acumen, EKG, Troponin, and serial changes to determine if it is an Acute Myocardial Infarction or myocardial injury due to an underlying chronic condition.         CBC & Differential [[id number]]  (Abnormal) Collected: 06/04/25 1109    Specimen: Blood Updated: 06/04/25 1311    Narrative:      The following orders were created for panel order CBC & Differential.  Procedure                               Abnormality         Status                     ---------                               -----------         ------                     CBC Auto Differential[[id number]]        Abnormal            Final result                 Please view results for these tests on the individual orders.    CBC Auto Differential [[id number]]  (Abnormal) Collected: 06/04/25 1109    Specimen: Blood Updated: 06/04/25 1311     WBC 11.42 10*3/mm3      RBC 4.32 10*6/mm3      Hemoglobin 12.5 g/dL      Hematocrit 38.7 %      MCV 89.6 fL      MCH 28.9 pg      MCHC 32.3 g/dL      RDW 14.9 %      RDW-SD 49.3 fl      MPV 10.2 fL      Platelets 434 10*3/mm3      Neutrophil % 88.9 %      Lymphocyte % 8.5 %      Monocyte % 1.8 %      Eosinophil % 0.1 %      Basophil % 0.3 %      Immature Grans % 0.4 %      Neutrophils, Absolute 10.17 10*3/mm3      Lymphocytes, Absolute 0.97 10*3/mm3      Monocytes, Absolute 0.20 10*3/mm3      Eosinophils, Absolute 0.01 10*3/mm3      Basophils, Absolute 0.03 10*3/mm3      Immature  Grans, Absolute 0.04 10*3/mm3      nRBC 0.0 /100 WBC     High Sensitivity Troponin T 1Hr [[id number]]  (Abnormal) Collected: 06/04/25 1244    Specimen: Blood Updated: 06/04/25 1315     HS Troponin T 23 ng/L      Troponin T Numeric Delta 7 ng/L      Troponin T % Delta 44    Narrative:      High Sensitive Troponin T Reference Range:  <14.0 ng/L- Negative Female for AMI  <22.0 ng/L- Negative Male for AMI  >=14 - Abnormal Female indicating possible myocardial injury.  >=22 - Abnormal Male indicating possible myocardial injury.   Clinicians would have to utilize clinical acumen, EKG, Troponin, and serial changes to determine if it is an Acute Myocardial Infarction or myocardial injury due to an underlying chronic condition.                  Imaging:    CT Abdomen Pelvis With Contrast  Result Date: 6/4/2025  CT ABDOMEN PELVIS W CONTRAST Date of Exam: 6/4/2025 12:02 PM EDT Indication: epigastric abd pain with vomiting. Comparison: CT of the abdomen and pelvis dated 8/16/2021 Technique: Axial CT images were obtained of the abdomen and pelvis after the uneventful intravenous administration of iodinated contrast. Reconstructed coronal and sagittal images were also obtained. Automated exposure control and iterative construction methods were used. Findings: Liver: The liver is unremarkable in morphology and decreased in attenuation. No focal liver lesion is seen. No biliary dilation is seen. Gallbladder: Cholelithiasis Pancreas: Unremarkable. Spleen: Several splenic granulomas. Adrenal glands: Unremarkable. Genitourinary tract: Small left renal cyst. Additional subcentimeter renal hypodensities which are too small to characterize. No hydronephrosis is seen. The visualized portions of the ureters and urinary bladder appear unremarkable. Status post hysterectomy. Gastrointestinal tract: Colonic diverticulosis. Surgical changes of the rectosigmoid colon. Hollow viscera appear otherwise unremarkable. There is no evidence of bowel  obstruction. Appendix: No findings to suggest acute appendicitis. Other findings: No free air or free fluid is identified. No pathologically enlarged lymph nodes are seen. The abdominal aorta and IVC appear unremarkable. Bones and soft tissues: No acute osseous lesion is identified. There are degenerative changes within the spine. There is grade 1 anterolisthesis at L4-L5. Status post kyphoplasty of a compression fracture of T11. Superficial soft tissues demonstrate no acute abnormality. Postsurgical changes of ventral abdominal wall hernia repair. Lung bases: The visualized lung bases are clear.     Impression: 1.No acute abnormality identified within the abdomen or pelvis. 2.Hepatic steatosis. 3.Cholelithiasis. 4.Colonic diverticulosis with surgical changes of the rectosigmoid colon. 5.Additional findings as detailed above. Electronically Signed: Darrell Arce MD  6/4/2025 12:30 PM EDT  Workstation ID: [id number]        Differential Diagnosis and Discussion:    Abdominal Pain: Based on the patient's signs and symptoms, I considered abdominal aortic aneurysm, small bowel obstruction, pancreatitis, acute cholecystitis, acute appendecitis, peptic ulcer disease, gastritis, colitis, endocrine disorders, irritable bowel syndrome and other differential diagnosis an etiology of the patient's abdominal pain.    PROCEDURES:    Labs were collected in the emergency department and all labs were reviewed and interpreted by me.  CT scan was performed in the emergency department and the CT scan radiology impression was interpreted by me.    ECG 12 Lead Other; Upper Abdominal Pain   Preliminary Result   HEART RATE=69  bpm   RR Kfexcoud=549  ms   OK Jbwgwrdr=168  ms   P Horizontal Axis=-11  deg   P Front Axis=43  deg   QRSD Zuparklz=095  ms   QT Cxnobnfd=092  ms   NXuL=788  ms   QRS Axis=16  deg   T Wave Axis=28  deg   - BORDERLINE ECG -   Unknown rhythm, irregular rate   Minimal ST elevation, inferior leads   Date and Time of  Study:2025-06-04 10:49:41          Procedures    MDM     Amount and/or Complexity of Data Reviewed  Clinical lab tests: reviewed  Tests in the radiology section of CPT®: reviewed  Tests in the medicine section of CPT®: reviewed  Decide to obtain previous medical records or to obtain history from someone other than the patient: yes       CT abdomen shows no acute abnormality.  There is evidence of cholelithiasis.  But no bile duct dilatation.  Bilirubin within normal limits.  Lipase 253.  I gave patient a liter of fluids.  Second troponin was 23.  I reviewed case with Dr. Garrett and since patient is feeling much better at this time and she denies chest pain he states we can discharge patient provide ambulatory referral to general surgery for follow-up.  I instructed patient to return to ED if she develops any new or worsening symptoms.  Patient is resting comfortably at this time and states she understands and agrees with plan of care.                Patient Care Considerations:          Consultants/Shared Management Plan:    None    Social Determinants of Health:    Patient is independent, reliable, and has access to care.       Disposition and Care Coordination:    Discharged: The patient is suitable and stable for discharge with no need for consideration of admission.    I have explained the patient´s condition, diagnoses and treatment plan based on the information available to me at this time. I have answered questions and addressed any concerns. The patient has a good  understanding of the patient´s diagnosis, condition, and treatment plan as can be expected at this point. The vital signs have been stable. The patient´s condition is stable and appropriate for discharge from the emergency department.      The patient will pursue further outpatient evaluation with the primary care physician or other designated or consulting physician as outlined in the discharge instructions. They are agreeable to this plan of  care and follow-up instructions have been explained in detail. The patient has received these instructions in written format and has expressed an understanding of the discharge instructions. The patient is aware that any significant change in condition or worsening of symptoms should prompt an immediate return to this or the closest emergency department or call to 911.  I have explained discharge medications and the need for follow up with the patient/caretakers. This was also printed in the discharge instructions. Patient was discharged with the following medications and follow up:      Medication List        New Prescriptions      dicyclomine 20 MG tablet  Commonly known as: BENTYL  Take 1 tablet by mouth Every 8 (Eight) Hours As Needed for Abdominal Cramping for up to 5 days.  Replaces: dicyclomine 10 MG capsule     ondansetron ODT 4 MG disintegrating tablet  Commonly known as: ZOFRAN-ODT  Place 1 tablet on the tongue Every 8 (Eight) Hours As Needed for Nausea or Vomiting for up to 5 days.            Stop      dicyclomine 10 MG capsule  Commonly known as: BENTYL  Replaced by: dicyclomine 20 MG tablet               Where to Get Your Medications        These medications were sent to [id number] - [place], [place] - [address] - [phone]  - [phone] FX  [address] [facility] [place] [address]      Phone: [phone]   dicyclomine 20 MG tablet  ondansetron ODT 4 MG disintegrating tablet      Seb Morataya MD  [address]  [facility] [address] [phone]    Call in 1 day  To schedule follow-up       Final diagnoses:   Gastroenteritis   Biliary calculus of other site without obstruction        ED Disposition       ED Disposition   Discharge    Condition   Stable    Comment   --               This medical record created using voice recognition software.             Eder Mayers PA-C  06/04/25 [id number]

## 2025-06-05 ENCOUNTER — CLINICAL SUPPORT (OUTPATIENT)
Dept: INTERNAL MEDICINE | Facility: CLINIC | Age: 70
End: 2025-06-05
Payer: MEDICARE

## 2025-06-05 DIAGNOSIS — J30.9 ALLERGIC RHINITIS, UNSPECIFIED SEASONALITY, UNSPECIFIED TRIGGER: Primary | ICD-10-CM

## 2025-06-05 PROCEDURE — 95117 IMMUNOTHERAPY INJECTIONS: CPT | Performed by: INTERNAL MEDICINE

## 2025-06-05 NOTE — PROGRESS NOTES
Patient came into office this afternoon for administration of allergy injections x 2; see eMAR for details.  Tolerated well; left immediately following.  No 15 min OBS.      History of Present Illness    Immunotherapy Pre-Injection Questionnaire:         Are you currently pregnant or been diagnosed with a new medical condition? No    1. Have you had increased asthmas symptoms (chest tightness, increased cough, wheezing, or shortness of breath) in the past week? No    2. Have you had increase allergy symptoms (itching eyes or nose, sneezing, runny nose, post-nasal drip, or throat-clearing) in the past week? No    3. Have you had a cold, respiratory tract infection, or flu-like symptoms in the past two weeks? No    4. Did you have any problems such as increased allergy or asthma symptoms, hives, or generalized itching within 12 hours or receiving your last injection? No    5. Are you on any new medications? New eye drops? No    6. Patient confirmed their name and birth date on allergy vials are correct. Yes         Jo Bhandari RN 6/5/2025

## 2025-06-11 ENCOUNTER — OFFICE VISIT (OUTPATIENT)
Dept: SURGERY | Facility: CLINIC | Age: 70
End: 2025-06-11
Payer: MEDICARE

## 2025-06-11 ENCOUNTER — PREP FOR SURGERY (OUTPATIENT)
Dept: OTHER | Facility: HOSPITAL | Age: 70
End: 2025-06-11
Payer: COMMERCIAL

## 2025-06-11 ENCOUNTER — OFFICE VISIT (OUTPATIENT)
Dept: INTERNAL MEDICINE | Facility: CLINIC | Age: 70
End: 2025-06-11
Payer: MEDICARE

## 2025-06-11 VITALS
DIASTOLIC BLOOD PRESSURE: 69 MMHG | HEART RATE: 69 BPM | RESPIRATION RATE: 18 BRPM | WEIGHT: 188 LBS | BODY MASS INDEX: 30.22 KG/M2 | HEIGHT: 66 IN | SYSTOLIC BLOOD PRESSURE: 115 MMHG

## 2025-06-11 VITALS
WEIGHT: 189.8 LBS | DIASTOLIC BLOOD PRESSURE: 68 MMHG | BODY MASS INDEX: 30.5 KG/M2 | TEMPERATURE: 98 F | OXYGEN SATURATION: 97 % | SYSTOLIC BLOOD PRESSURE: 110 MMHG | HEIGHT: 66 IN | HEART RATE: 67 BPM

## 2025-06-11 DIAGNOSIS — I10 ESSENTIAL HYPERTENSION: ICD-10-CM

## 2025-06-11 DIAGNOSIS — R07.9 CHEST PAIN, UNSPECIFIED TYPE: ICD-10-CM

## 2025-06-11 DIAGNOSIS — M10.9 GOUT, UNSPECIFIED CAUSE, UNSPECIFIED CHRONICITY, UNSPECIFIED SITE: ICD-10-CM

## 2025-06-11 DIAGNOSIS — E78.2 MIXED HYPERLIPIDEMIA: ICD-10-CM

## 2025-06-11 DIAGNOSIS — E11.9 TYPE 2 DIABETES MELLITUS WITHOUT COMPLICATION, WITHOUT LONG-TERM CURRENT USE OF INSULIN: Primary | ICD-10-CM

## 2025-06-11 DIAGNOSIS — K80.20 CALCULUS OF GALLBLADDER WITHOUT CHOLECYSTITIS WITHOUT OBSTRUCTION: ICD-10-CM

## 2025-06-11 DIAGNOSIS — K80.20 CALCULUS OF GALLBLADDER WITHOUT CHOLECYSTITIS WITHOUT OBSTRUCTION: Primary | ICD-10-CM

## 2025-06-11 PROBLEM — J30.9 ALLERGIC RHINITIS: Status: RESOLVED | Noted: 2024-05-08 | Resolved: 2025-06-11

## 2025-06-11 PROBLEM — J20.9 ACUTE BRONCHITIS: Status: RESOLVED | Noted: 2024-05-08 | Resolved: 2025-06-11

## 2025-06-11 LAB
ALBUMIN SERPL-MCNC: 4.1 G/DL (ref 3.5–5.2)
ALBUMIN/GLOB SERPL: 1.2 G/DL
ALP SERPL-CCNC: 95 U/L (ref 39–117)
ALT SERPL W P-5'-P-CCNC: 43 U/L (ref 1–33)
ANION GAP SERPL CALCULATED.3IONS-SCNC: 11.8 MMOL/L (ref 5–15)
AST SERPL-CCNC: 28 U/L (ref 1–32)
BASOPHILS # BLD AUTO: 0.06 10*3/MM3 (ref 0–0.2)
BASOPHILS NFR BLD AUTO: 0.7 % (ref 0–1.5)
BILIRUB SERPL-MCNC: 0.3 MG/DL (ref 0–1.2)
BUN SERPL-MCNC: 37 MG/DL (ref 8–23)
BUN/CREAT SERPL: 28.2 (ref 7–25)
CALCIUM SPEC-SCNC: 10.7 MG/DL (ref 8.6–10.5)
CHLORIDE SERPL-SCNC: 104 MMOL/L (ref 98–107)
CHOLEST SERPL-MCNC: 144 MG/DL (ref 0–200)
CO2 SERPL-SCNC: 26.2 MMOL/L (ref 22–29)
CREAT SERPL-MCNC: 1.31 MG/DL (ref 0.57–1)
DEPRECATED RDW RBC AUTO: 48 FL (ref 37–54)
EGFRCR SERPLBLD CKD-EPI 2021: 44.7 ML/MIN/1.73
EOSINOPHIL # BLD AUTO: 0.21 10*3/MM3 (ref 0–0.4)
EOSINOPHIL NFR BLD AUTO: 2.4 % (ref 0.3–6.2)
ERYTHROCYTE [DISTWIDTH] IN BLOOD BY AUTOMATED COUNT: 14.3 % (ref 12.3–15.4)
GLOBULIN UR ELPH-MCNC: 3.4 GM/DL
GLUCOSE SERPL-MCNC: 106 MG/DL (ref 65–99)
HBA1C MFR BLD: 5.3 % (ref 4.8–5.6)
HCT VFR BLD AUTO: 36.7 % (ref 34–46.6)
HDLC SERPL-MCNC: 48 MG/DL (ref 40–60)
HGB BLD-MCNC: 11.9 G/DL (ref 12–15.9)
IMM GRANULOCYTES # BLD AUTO: 0.03 10*3/MM3 (ref 0–0.05)
IMM GRANULOCYTES NFR BLD AUTO: 0.3 % (ref 0–0.5)
LDLC SERPL CALC-MCNC: 72 MG/DL (ref 0–100)
LDLC/HDLC SERPL: 1.44 {RATIO}
LYMPHOCYTES # BLD AUTO: 2.23 10*3/MM3 (ref 0.7–3.1)
LYMPHOCYTES NFR BLD AUTO: 25.2 % (ref 19.6–45.3)
MCH RBC QN AUTO: 29.6 PG (ref 26.6–33)
MCHC RBC AUTO-ENTMCNC: 32.4 G/DL (ref 31.5–35.7)
MCV RBC AUTO: 91.3 FL (ref 79–97)
MONOCYTES # BLD AUTO: 0.65 10*3/MM3 (ref 0.1–0.9)
MONOCYTES NFR BLD AUTO: 7.3 % (ref 5–12)
NEUTROPHILS NFR BLD AUTO: 5.68 10*3/MM3 (ref 1.7–7)
NEUTROPHILS NFR BLD AUTO: 64.1 % (ref 42.7–76)
NRBC BLD AUTO-RTO: 0 /100 WBC (ref 0–0.2)
PLATELET # BLD AUTO: 421 10*3/MM3 (ref 140–450)
PMV BLD AUTO: 10.5 FL (ref 6–12)
POTASSIUM SERPL-SCNC: 3.9 MMOL/L (ref 3.5–5.2)
PROT SERPL-MCNC: 7.5 G/DL (ref 6–8.5)
RBC # BLD AUTO: 4.02 10*6/MM3 (ref 3.77–5.28)
SODIUM SERPL-SCNC: 142 MMOL/L (ref 136–145)
TRIGL SERPL-MCNC: 134 MG/DL (ref 0–150)
TSH SERPL DL<=0.05 MIU/L-ACNC: 2.1 UIU/ML (ref 0.27–4.2)
URATE SERPL-MCNC: 5.4 MG/DL (ref 2.4–5.7)
VLDLC SERPL-MCNC: 24 MG/DL (ref 5–40)
WBC NRBC COR # BLD AUTO: 8.86 10*3/MM3 (ref 3.4–10.8)

## 2025-06-11 PROCEDURE — 80061 LIPID PANEL: CPT | Performed by: INTERNAL MEDICINE

## 2025-06-11 PROCEDURE — 83036 HEMOGLOBIN GLYCOSYLATED A1C: CPT | Performed by: INTERNAL MEDICINE

## 2025-06-11 PROCEDURE — 80053 COMPREHEN METABOLIC PANEL: CPT | Performed by: INTERNAL MEDICINE

## 2025-06-11 PROCEDURE — 85025 COMPLETE CBC W/AUTO DIFF WBC: CPT | Performed by: INTERNAL MEDICINE

## 2025-06-11 PROCEDURE — 84550 ASSAY OF BLOOD/URIC ACID: CPT | Performed by: INTERNAL MEDICINE

## 2025-06-11 PROCEDURE — 84443 ASSAY THYROID STIM HORMONE: CPT | Performed by: INTERNAL MEDICINE

## 2025-06-11 RX ORDER — SODIUM CHLORIDE 9 MG/ML
40 INJECTION, SOLUTION INTRAVENOUS AS NEEDED
OUTPATIENT
Start: 2025-06-11

## 2025-06-11 RX ORDER — INDOCYANINE GREEN AND WATER 25 MG
2.5 KIT INJECTION ONCE
OUTPATIENT
Start: 2025-06-11 | End: 2025-06-11

## 2025-06-11 RX ORDER — SODIUM CHLORIDE 0.9 % (FLUSH) 0.9 %
10 SYRINGE (ML) INJECTION AS NEEDED
OUTPATIENT
Start: 2025-06-11

## 2025-06-11 RX ORDER — SODIUM CHLORIDE 0.9 % (FLUSH) 0.9 %
10 SYRINGE (ML) INJECTION EVERY 12 HOURS SCHEDULED
OUTPATIENT
Start: 2025-06-11

## 2025-06-11 NOTE — ASSESSMENT & PLAN NOTE
Orders:    CBC & Differential    Comprehensive Metabolic Panel    Lipid Panel    Hemoglobin A1c    TSH Rfx On Abnormal To Free T4; Future

## 2025-06-11 NOTE — PROGRESS NOTES
Chief Complaint: Cholelithiasis (CT Done)    Subjective       History of Present Illness    India Gaytan is a 69 y.o. female presents to [facility] GENERAL SURGERY   History of Present Illness  The patient presents for evaluation of gallstones.    She has a known history of gallstones and experienced an episode of upper abdominal pain last week, which was a new symptom for her. The pain radiated to her back and was accompanied by nausea and vomiting. She is planning a trip to [place] in the coming weeks and is concerned about the timing of potential surgery. She also inquires about post-surgical driving restrictions. She reports that narcotic medications induce nausea in her. The discomfort was alleviated with pain medication.    FAMILY HISTORY  - Mother: Gallbladder removal  - Father: Gallbladder removal  - Sister: Gallbladder removal  - Sister: Gallbladder removal  Objective     Past Medical History:   Diagnosis Date    Anemia     ASYMPTOMATIC    Anesthesia     SLOW TO WAKE UP POSTOP     Benign essential hypertension     Chronic allergic rhinitis     Essential hypertension     Gout 05/30/2019    greatly improved    High cholesterol     Hyperlipidemia     Lower extremity edema 09/18/2017    cont compression stockings can do lymphedema therapy if she wishes in the future    Lumbar back pain with radiculopathy affecting right lower extremity 06/12/2015    Mixed hyperlipidemia     Osteoarthritis of right hip 06/12/2015    Pain of right hip joint 09/18/2017    has chronic arthritis of that hip with tendonitits seen previously in MRI however she doesn't wish to do pt at this time, will try stretches that she has done in the past at home currently    PONV (postoperative nausea and vomiting)     Seasonal allergies     Ventral hernia CURRENTLY       Past Surgical History:   Procedure Laterality Date    ABDOMINAL HYSTERECTOMY      BACK SURGERY      BREAST CYST EXCISION      COLON RESECTION  2020      KEYLA    COLON SURGERY      RELATED TO ABSCESS    COLONOSCOPY  2006, 2019, 2020    COLONOSCOPY N/A 11/08/2022    Procedure: COLONOSCOPY;  Surgeon: Aly Rangel MD;  Location: [facility] ENDOSCOPY;  Service: Gastroenterology;  Laterality: N/A;  DIVERTICULOSIS, ANASTOMOSIS AT 15CM    ENDOSCOPY N/A 11/08/2022    Procedure: ESOPHAGOGASTRODUODENOSCOPY WITH BX;  Surgeon: Aly Rangel MD;  Location: [facility] ENDOSCOPY;  Service: Gastroenterology;  Laterality: N/A;  HIATAL HERNIA    EXCISION LESION N/A 06/13/2022    Procedure: EXCISION BACK MASS;  Surgeon: Eliseo Cannon MD;  Location: [facility] MAIN OR;  Service: General;  Laterality: N/A;    EXCISION LESION N/A 08/16/2024    Procedure: CHEST WALL MASS EXCISION;  Surgeon: Eliseo Canonn MD;  Location: [facility] MAIN OR;  Service: General;  Laterality: N/A;    HERNIA REPAIR  12/2020    VENTRAL HERNIA     HYSTERECTOMY  1996    OOPHORECTOMY  1999    ROTATOR CUFF REPAIR Left 2011    VENTRAL HERNIA REPAIR N/A 08/02/2021    Procedure: VENTRAL HERNIA REPAIR LAPAROSCOPIC WITH DAVINCI ROBOT;  Surgeon: Eliseo Cannon MD;  Location: [facility] MAIN OR;  Service: Robotics - DaVinci;  Laterality: N/A;    VERTEBROPLASTY  08/07/2020    T11         Current Outpatient Medications:     albuterol sulfate  (90 Base) MCG/ACT inhaler, Inhale 2 puffs Every 4 (Four) Hours As Needed for Wheezing or Shortness of Air (USES FOR ALLEGERIES)., Disp: , Rfl:     Allergy Relief 5 MG tablet, Take 1 tablet by mouth Daily., Disp: 90 tablet, Rfl: 1    allopurinol 200 MG tablet, Take 200 mg by mouth Daily., Disp: 90 tablet, Rfl: 1    azelastine (ASTELIN) 0.1 % nasal spray, Administer 2 sprays into the nostril(s) as directed by provider 2 (Two) Times a Day. Use in each nostril as directed, Disp: 30 mL, Rfl: 1    colchicine 0.6 MG tablet, Take 1 tablet by mouth Daily., Disp: 10 tablet, Rfl: 0    fenofibrate (TRICOR) 145 MG tablet, Take 1 tablet by mouth Daily., Disp: 90 tablet, Rfl:  1    fluticasone (FLONASE) 50 MCG/ACT nasal spray, Administer 2 sprays into the nostril(s) as directed by provider Daily., Disp: 11.1 g, Rfl: 1    furosemide (LASIX) 20 MG tablet, Take 1 tablet by mouth Daily., Disp: 90 tablet, Rfl: 1    montelukast (SINGULAIR) 10 MG tablet, Take 1 tablet by mouth Every Night., Disp: 90 tablet, Rfl: 1    olmesartan-hydrochlorothiazide (BENICAR HCT) 20-12.5 MG per tablet, TAKE ONE TABLET BY MOUTH EVERY DAY, Disp: 90 tablet, Rfl: 1    pantoprazole (PROTONIX) 40 MG EC tablet, Take 1 tablet by mouth Daily., Disp: 90 tablet, Rfl: 1    Pataday 0.7 % solution, Administer 1 drop to both eyes Daily As Needed. NOT USED IN A WHILE, Disp: , Rfl:     potassium chloride (MICRO-K) 10 MEQ CR capsule, Take 1 capsule by mouth 2 (Two) Times a Day., Disp: 180 capsule, Rfl: 1    Tirzepatide 10 MG/0.5ML solution auto-injector, Inject 10 mg under the skin into the appropriate area as directed 1 (One) Time Per Week., Disp: 6 mL, Rfl: 1    VITAMIN D, ERGOCALCIFEROL, PO, Take  by mouth Daily., Disp: , Rfl:     Current Facility-Administered Medications:     patient supplied allergy injection, 0.5 mL, Subcutaneous, Q28 Days, Lisa Jones MD, 0.5 mL at 06/05/25 1020    patient supplied allergy injection, 0.5 mL, Subcutaneous, Q28 Days, Lisa Jones MD, 0.5 mL at 06/05/25 1020    Allergies   Allergen Reactions    Levofloxacin Nausea And Vomiting     Pt reported also caused her to pass out         Family History   Problem Relation Age of Onset    Heart disease Mother     Heart disease Father     Colon cancer Neg Hx     Malig Hyperthermia Neg Hx        Social History     Socioeconomic History    Marital status:    Tobacco Use    Smoking status: Never     Passive exposure: Never    Smokeless tobacco: Never   Vaping Use    Vaping status: Never Used   Substance and Sexual Activity    Alcohol use: Yes     Comment: Rarely drinks    Drug use: Never    Sexual activity: Defer       Vital Signs:   BP  "115/69   Pulse 69   Resp 18   Ht 167.6 cm (65.98\")   Wt 85.3 kg (188 lb)   BMI 30.36 kg/m²      Physical Exam   Physical Exam  General: Patient is in no acute distress.  Respiratory: Breathing is nonlabored.  Gastrointestinal: Abdomen is soft.      Result Review :         Procedures   Results  Imaging   - CAT scan: No acute abnormality, presence of gallstones, old diverticulosis, some spine issues, hernia repair evidence, and a small cyst in the kidney.           Assessment and Plan   Diagnoses and all orders for this visit:    1. Calculus of gallbladder without cholecystitis without obstruction (Primary)          Assessment & Plan  1. Symptomatic cholelithiasis.  A comprehensive discussion was held regarding the potential risks, benefits, and alternatives associated with robotic cholecystectomy. The primary risks include bleeding, infection, and injury to surrounding structures such as the bowel, colon, stomach, liver, and bile ducts. Specific risks include cutting the smaller artery that runs to the gallbladder and the potential for injuring the main bile duct, which occurs in approximately 1 in 5000 surgeries in the [place]. The benefits include the resolution of symptoms and prevention of future gallbladder attacks. Alternatives discussed include monitoring and managing symptoms without surgery, though this carries the risk of worsening symptoms and complications such as cholecystitis or a stone stuck in the common duct. The procedure is typically outpatient, with restrictions including no heavy lifting >15 pounds and no swimming or bathing for 2 weeks post-surgery. Most patients return to normal eating and drinking habits, although some may experience diarrhea, especially after consuming fatty or greasy foods. Pain medication will be provided for her upcoming trip to [place]. All queries were addressed, and she expressed understanding and agreement with the proposed plan. A robotic cholecystectomy is " planned for the future. She was advised to avoid fatty and greasy foods, which can trigger gallbladder attacks.       Follow Up   No follow-ups on file.  Patient was given instructions and counseling regarding her condition or for health maintenance advice. Please see specific information pulled into the AVS if appropriate.     Discussed with the patient - all questions were answered they voiced understanding and agreed to proceed with above plan    Patient or patient representative verbalized consent for the use of Ambient Listening during the visit with  Eliseo Cannon MD for chart documentation. 6/11/2025  15:26 EDT    Eliseo Cannon MD

## 2025-06-11 NOTE — PROGRESS NOTES
Chief Complaint  ER Follow Up (Patient was seen in the ED on 6/4/2025 for Gastroenteritis, patient seen General Surgery today has surgery on 7/21/2025/)      Subjective      History of Present Illness  The patient presents for evaluation of abdominal pain, a potential bunion, and diabetes management.    She reports nightly awakenings at 2 AM with an unusual stomach sensation. She attempted to induce vomiting but was initially unsuccessful. Subsequently, she experienced sharp, persistent pain that escalated in intensity. Despite taking a muscle relaxer, the pain worsened. At the ER, she received IV fluids. Her heart rate was 105, and her blood pressure was 161/80. A CT scan revealed gallstones. She was treated with morphine and antiemetics for dry heaves. Referred to Dr. Cannon recommended cholecystectomy due to gallstones. She has a scheduled trip to [place] and has been advised to avoid fatty foods and alcohol. She declined narcotic pain medication due to previous adverse reactions. She administers her Mounjaro injection on Fridays and experienced the attack on a Tuesday after consuming spaghetti the night before. No issues since her last Mounjaro injection. She continues to feel something moving in her abdomen. No exacerbation of pain with physical activity, no associated nausea or sweating, and no chest pain. Good exercise tolerance, particularly after recent weight loss. Capable of using a treadmill.    She reports occasional foot pain, suspecting a developing bunion. History of gout, currently well-controlled.    Uncertain about current blood sugar levels but reports decreased intake of sugary foods. On Mounjaro. No major nausea or vomiting. Occasionally takes MiraLAX for constipation.    Eye exam on 05/20/2025 at [facility] in [place] was normal.             Objective   Vital Signs:   Vitals:    06/11/25 1339   BP: 110/68   Pulse: 67   Temp: 98 °F (36.7 °C)   SpO2: 97%   Weight: 86.1 kg (189 lb 12.8 oz)  "  Height: 167.6 cm (65.98\")     Body mass index is 30.65 kg/m².    Wt Readings from Last 3 Encounters:   06/11/25 86.1 kg (189 lb 12.8 oz)   06/11/25 85.3 kg (188 lb)   06/04/25 87.3 kg (192 lb 7.4 oz)     BP Readings from Last 3 Encounters:   06/11/25 110/68   06/11/25 115/69   06/04/25 142/78       Health Maintenance   Topic Date Due    DIABETIC FOOT EXAM  08/01/2024    COVID-19 Vaccine (4 - 2024-25 season) 09/01/2024    DXA SCAN  01/16/2025    DIABETIC EYE EXAM  05/10/2025    HEMOGLOBIN A1C  07/31/2025    INFLUENZA VACCINE  07/01/2025    ANNUAL WELLNESS VISIT  01/31/2026    LIPID PANEL  01/31/2026    URINE MICROALBUMIN-CREATININE RATIO (uACR)  01/31/2026    MAMMOGRAM  05/16/2026    TDAP/TD VACCINES (2 - Td or Tdap) 08/01/2026    COLORECTAL CANCER SCREENING  11/08/2027    HEPATITIS C SCREENING  Completed    Pneumococcal Vaccine 50+  Completed    ZOSTER VACCINE  Completed       Physical Exam  Vitals reviewed.   Constitutional:       Appearance: Normal appearance. She is well-developed. She is obese.   HENT:      Head: Normocephalic and atraumatic.      Right Ear: External ear normal.      Left Ear: External ear normal.   Eyes:      Conjunctiva/sclera: Conjunctivae normal.      Pupils: Pupils are equal, round, and reactive to light.   Cardiovascular:      Rate and Rhythm: Normal rate and regular rhythm.      Heart sounds: No murmur heard.     No friction rub. No gallop.   Pulmonary:      Effort: Pulmonary effort is normal.      Breath sounds: Normal breath sounds. No wheezing or rhonchi.   Skin:     General: Skin is warm and dry.   Neurological:      Mental Status: She is alert and oriented to person, place, and time.   Psychiatric:         Mood and Affect: Affect normal.         Behavior: Behavior normal.         Thought Content: Thought content normal.        Physical Exam  Musculoskeletal: Possible bunion formation on the foot      Result Review :  The following data was reviewed by: Lisa Jones MD on " 06/11/2025:         Results  - Labs:  Reviewed recent labs from the ER that showed mildly elevated troponins x 2    Reviewed recent CT abdomen    - Diagnostic Testing:    - EKG: Minimal ST elevation            Procedures            Assessment & Plan  Type 2 diabetes mellitus without complication, without long-term current use of insulin      Orders:    CBC & Differential    Comprehensive Metabolic Panel    Lipid Panel    Hemoglobin A1c    TSH Rfx On Abnormal To Free T4; Future    Calculus of gallbladder without cholecystitis without obstruction         Mixed hyperlipidemia       Orders:    CBC & Differential    Comprehensive Metabolic Panel    Lipid Panel    Hemoglobin A1c    TSH Rfx On Abnormal To Free T4; Future    Essential hypertension      Orders:    CBC & Differential    Comprehensive Metabolic Panel    Lipid Panel    Hemoglobin A1c    TSH Rfx On Abnormal To Free T4; Future    Chest pain, unspecified type    Orders:    Stress test with myocardial perfusion; Future    Gout, unspecified cause, unspecified chronicity, unspecified site    Orders:    Uric acid; Future         Assessment & Plan  Abdominal pain  - Likely due to gallstones  - Advised to undergo cholecystectomy, scheduled for 07/21/2025  - Counseled to avoid fatty and greasy foods and limit alcohol during [place] trip  - Basic blood work today to assess kidney function  - Stress test ordered prior to gallbladder surgery due to minimal ST elevation on EKG and positive troponin result  - Seek immediate medical attention at ER if recurrent pain    Bunion  - Intermittent foot pain suggestive of bunion, possibly related to gout history  - Physical exam indicates bunion  - Advised to wear larger shoes to alleviate pressure  - Referral to podiatrist if condition worsens or becomes more bothersome  - Uric acid level test today    Diabetes mellitus  - On Mounjaro, no major issues with nausea or vomiting  - Occasionally takes MiraLAX for constipation  - Blood  sugar levels reportedly stable  - Advised to continue current regimen and monitor blood sugar levels, especially while traveling  - Annual eye exam completed on 05/2025 with normal results    Patient or patient representative verbalized consent for the use of Ambient Listening during the visit with  Lisa Jones MD for chart documentation. 6/11/2025  14:37 EDT      FOLLOW UP  No follow-ups on file.  Patient was given instructions and counseling regarding her condition or for health maintenance advice. Please see specific information pulled into the AVS if appropriate.     Lisa Jones MD  06/11/25  14:48 EDT    CURRENT & DISCONTINUED MEDICATIONS  Current Outpatient Medications   Medication Instructions    albuterol sulfate  (90 Base) MCG/ACT inhaler 2 puffs, Every 4 Hours PRN    Allergy Relief 5 mg, Oral, Daily    Allopurinol 200 mg, Oral, Daily    azelastine (ASTELIN) 0.1 % nasal spray 2 sprays, Nasal, 2 Times Daily, Use in each nostril as directed    colchicine 0.6 mg, Oral, Daily    fenofibrate (TRICOR) 145 mg, Oral, Daily    fluticasone (FLONASE) 50 MCG/ACT nasal spray 2 sprays, Nasal, Daily    furosemide (LASIX) 20 mg, Oral, Daily    montelukast (SINGULAIR) 10 mg, Oral, Nightly    olmesartan-hydrochlorothiazide (BENICAR HCT) 20-12.5 MG per tablet 1 tablet, Oral, Daily    pantoprazole (PROTONIX) 40 mg, Oral, Daily    Pataday 0.7 % solution 1 drop, Daily PRN    potassium chloride (MICRO-K) 10 MEQ CR capsule 10 mEq, Oral, 2 Times Daily    Tirzepatide 10 mg, Subcutaneous, Weekly    VITAMIN D, ERGOCALCIFEROL, PO Daily       Medications Discontinued During This Encounter   Medication Reason    EPINEPHrine (EPIPEN) 0.3 MG/0.3ML solution auto-injector injection

## 2025-06-12 ENCOUNTER — TELEPHONE (OUTPATIENT)
Dept: INTERNAL MEDICINE | Facility: CLINIC | Age: 70
End: 2025-06-12
Payer: COMMERCIAL

## 2025-06-12 RX ORDER — FLUCONAZOLE 100 MG/1
100 TABLET ORAL DAILY
Qty: 3 TABLET | Refills: 0 | Status: SHIPPED | OUTPATIENT
Start: 2025-06-12

## 2025-06-12 NOTE — TELEPHONE ENCOUNTER
Patient called stated she forgot to mention at her appointment on 6/10.     She had contrast with her CT scan on 6/4 and patient stated it always gives her a yeast infection. Patient was wondering if you would be able to call anything in for it.    Please advise

## 2025-06-12 NOTE — TELEPHONE ENCOUNTER
Called and spoke with pt, explained to pt that medication has been sent to the pharmacy, pt verbalized understanding and had no further questions at this time.

## 2025-06-16 DIAGNOSIS — I10 ESSENTIAL HYPERTENSION: ICD-10-CM

## 2025-06-16 RX ORDER — POTASSIUM CHLORIDE 750 MG/1
10 CAPSULE, EXTENDED RELEASE ORAL 2 TIMES DAILY
Qty: 180 CAPSULE | Refills: 1 | Status: SHIPPED | OUTPATIENT
Start: 2025-06-16

## 2025-06-16 RX ORDER — FUROSEMIDE 20 MG/1
20 TABLET ORAL DAILY
Qty: 90 TABLET | Refills: 1 | Status: SHIPPED | OUTPATIENT
Start: 2025-06-16

## 2025-06-16 RX ORDER — OLMESARTAN MEDOXOMIL AND HYDROCHLOROTHIAZIDE 20/12.5 20; 12.5 MG/1; MG/1
1 TABLET ORAL DAILY
Qty: 90 TABLET | Refills: 1 | Status: SHIPPED | OUTPATIENT
Start: 2025-06-16

## 2025-06-16 RX ORDER — PANTOPRAZOLE SODIUM 40 MG/1
40 TABLET, DELAYED RELEASE ORAL DAILY
Qty: 90 TABLET | Refills: 1 | Status: SHIPPED | OUTPATIENT
Start: 2025-06-16

## 2025-06-16 RX ORDER — FENOFIBRATE 145 MG/1
145 TABLET, FILM COATED ORAL DAILY
Qty: 90 TABLET | Refills: 1 | Status: SHIPPED | OUTPATIENT
Start: 2025-06-16

## 2025-06-16 RX ORDER — ALLOPURINOL 200 MG/1
200 TABLET ORAL DAILY
Qty: 90 TABLET | Refills: 1 | Status: SHIPPED | OUTPATIENT
Start: 2025-06-16

## 2025-06-16 NOTE — TELEPHONE ENCOUNTER
Caller: India Gaytan    Relationship: Self    Best call back number: [phone]    Requested Prescriptions:   Requested Prescriptions     Pending Prescriptions Disp Refills    fenofibrate (TRICOR) 145 MG tablet 90 tablet 1     Sig: Take 1 tablet by mouth Daily.    olmesartan-hydrochlorothiazide (BENICAR HCT) 20-12.5 MG per tablet 90 tablet 1     Sig: Take 1 tablet by mouth Daily.    pantoprazole (PROTONIX) 40 MG EC tablet 90 tablet 1     Sig: Take 1 tablet by mouth Daily.    furosemide (LASIX) 20 MG tablet 90 tablet 1     Sig: Take 1 tablet by mouth Daily.    Allopurinol 200 MG tablet 90 tablet 1     Sig: Take 200 mg by mouth Daily.    potassium chloride (MICRO-K) 10 MEQ CR capsule 180 capsule 1     Sig: Take 1 capsule by mouth 2 (Two) Times a Day.        Pharmacy where request should be sent: [facility] - [place], [place] - [address] [facility] - [phone]  - [phone]     Last office visit with prescribing clinician: 6/11/2025   Last telemedicine visit with prescribing clinician: Visit date not found   Next office visit with prescribing clinician: 7/30/2025     Additional details provided by patient: PATIENT WILL BE LEAVING [facility] 06/20/2025, AND WILL NEED TO PICK THESE REFILLS UP BY THEN.     Does the patient have less than a 3 day supply:  [] Yes  [x] No    Would you like a call back once the refill request has been completed: [] Yes [] No    If the office needs to give you a call back, can they leave a voicemail: [] Yes [] No    John Grace   06/16/25 09:50 EDT

## 2025-06-19 LAB
QT INTERVAL: 394 MS
QTC INTERVAL: 423 MS

## 2025-07-02 ENCOUNTER — HOSPITAL ENCOUNTER (OUTPATIENT)
Facility: HOSPITAL | Age: 70
Discharge: HOME | End: 2025-07-02
Payer: MEDICARE

## 2025-07-02 DIAGNOSIS — R07.9 CHEST PAIN, UNSPECIFIED TYPE: ICD-10-CM

## 2025-07-02 LAB
BH CV IMMEDIATE POST RECOVERY TECH DATA SYMPTOMS: NORMAL
BH CV IMMEDIATE POST TECH DATA BLOOD PRESSURE: NORMAL MMHG
BH CV IMMEDIATE POST TECH DATA HEART RATE: 100 BPM
BH CV IMMEDIATE POST TECH DATA OXYGEN SATS: 98 %
BH CV REST NUCLEAR ISOTOPE DOSE: 9.7 MCI
BH CV SIX MINUTE RECOVERY TECH DATA BLOOD PRESSURE: NORMAL
BH CV SIX MINUTE RECOVERY TECH DATA HEART RATE: 65 BPM
BH CV SIX MINUTE RECOVERY TECH DATA OXYGEN SATURATION: 97 %
BH CV SIX MINUTE RECOVERY TECH DATA SYMPTOMS: NORMAL
BH CV STRESS BP STAGE 1: NORMAL
BH CV STRESS BP STAGE 2: NORMAL
BH CV STRESS BP STAGE 3: NORMAL
BH CV STRESS DURATION MIN STAGE 1: 3
BH CV STRESS DURATION MIN STAGE 2: 3
BH CV STRESS DURATION MIN STAGE 3: 0
BH CV STRESS DURATION SEC STAGE 1: 0
BH CV STRESS DURATION SEC STAGE 2: 0
BH CV STRESS DURATION SEC STAGE 3: 45
BH CV STRESS GRADE STAGE 1: 10
BH CV STRESS GRADE STAGE 2: 12
BH CV STRESS GRADE STAGE 3: 14
BH CV STRESS HR STAGE 1: 108
BH CV STRESS HR STAGE 2: 133
BH CV STRESS HR STAGE 3: 136
BH CV STRESS METS STAGE 1: 5
BH CV STRESS METS STAGE 2: 7.5
BH CV STRESS METS STAGE 3: 10
BH CV STRESS NUCLEAR ISOTOPE DOSE: 38 MCI
BH CV STRESS O2 STAGE 1: 97
BH CV STRESS O2 STAGE 2: 98
BH CV STRESS O2 STAGE 3: 97
BH CV STRESS PROTOCOL 1: NORMAL
BH CV STRESS RECOVERY BP: NORMAL MMHG
BH CV STRESS RECOVERY HR: 65 BPM
BH CV STRESS RECOVERY O2: 97 %
BH CV STRESS SPEED STAGE 1: 1.7
BH CV STRESS SPEED STAGE 2: 2.5
BH CV STRESS SPEED STAGE 3: 3.4
BH CV STRESS STAGE 1: 1
BH CV STRESS STAGE 2: 2
BH CV STRESS STAGE 3: 3
BH CV THREE MINUTE POST TECH DATA BLOOD PRESSURE: NORMAL MMHG
BH CV THREE MINUTE POST TECH DATA HEART RATE: 77 BPM
BH CV THREE MINUTE POST TECH DATA OXYGEN SATURATION: 98 %
MAXIMAL PREDICTED HEART RATE: 153 BPM
PERCENT MAX PREDICTED HR: 95.42 %
SPECT HRT GATED+EF W RNC IV: 57 %
STRESS BASELINE BP: NORMAL MMHG
STRESS BASELINE HR: 50 BPM
STRESS O2 SAT REST: 99 %
STRESS PERCENT HR: 112 %
STRESS POST ESTIMATED WORKLOAD: 7.4 METS
STRESS POST EXERCISE DUR MIN: 6 MIN
STRESS POST EXERCISE DUR SEC: 45 SEC
STRESS POST O2 SAT PEAK: 98 %
STRESS POST PEAK BP: NORMAL MMHG
STRESS POST PEAK HR: 146 BPM
STRESS TARGET HR: 130 BPM

## 2025-07-02 PROCEDURE — 34310000005 TECHNETIUM TETROFOSMIN KIT: Performed by: INTERNAL MEDICINE

## 2025-07-02 PROCEDURE — 93017 CV STRESS TEST TRACING ONLY: CPT

## 2025-07-02 PROCEDURE — 78452 HT MUSCLE IMAGE SPECT MULT: CPT

## 2025-07-02 PROCEDURE — A9502 TC99M TETROFOSMIN: HCPCS | Performed by: INTERNAL MEDICINE

## 2025-07-02 RX ADMIN — TETROFOSMIN 1 DOSE: 1.38 INJECTION, POWDER, LYOPHILIZED, FOR SOLUTION INTRAVENOUS at 10:40

## 2025-07-02 RX ADMIN — TETROFOSMIN 1 DOSE: 1.38 INJECTION, POWDER, LYOPHILIZED, FOR SOLUTION INTRAVENOUS at 08:22

## 2025-07-03 DIAGNOSIS — I49.3 PVC (PREMATURE VENTRICULAR CONTRACTION): ICD-10-CM

## 2025-07-03 DIAGNOSIS — R94.39 ABNORMAL STRESS TEST: Primary | ICD-10-CM

## 2025-07-17 ENCOUNTER — TELEPHONE (OUTPATIENT)
Dept: SURGERY | Facility: CLINIC | Age: 70
End: 2025-07-17
Payer: COMMERCIAL

## 2025-07-17 NOTE — TELEPHONE ENCOUNTER
KADI CHOWDHURY CALLED FROM [facility].  PATIENT IS SCHEDULED FOR SURGERY ON 07/21/25.  SHE HAS AN URGENT REFERRAL FOR AN ABNORMAL STRESS TEST, AND HER APPOINTMENT WITH DR. HORTA IS 07/31/25.  SHE WILL HAVE TO HAVE CLEARANCE.    CB#[id number]

## 2025-07-17 NOTE — TELEPHONE ENCOUNTER
SPOKE TO THE PATIENT AND LET HER KNOW THAT WE WILL HAVE TO CX HER SURGERY AND TO CALL US BACK ONCE SHE SEE'S DR HORTA AND WE CAN GET CARDIAC CLEARANCE AND RESCHEDULE HER SURGERY. SHE STATES SCHOOL WILL BE STARTING BACK AND SHE DOES NOT KNOW WHEN SHE WILL BE ABLE TO DO THE SURGERY. I TOLD HER WE CAN WORK AROUND HER SCHEDULE. PATIENT VOICED UNDERSTANDING. CALLED CHON AND CXD THE SURGERY.

## 2025-07-18 ENCOUNTER — CLINICAL SUPPORT (OUTPATIENT)
Dept: INTERNAL MEDICINE | Facility: CLINIC | Age: 70
End: 2025-07-18
Payer: MEDICARE

## 2025-07-18 DIAGNOSIS — J30.89 ENVIRONMENTAL AND SEASONAL ALLERGIES: Primary | ICD-10-CM

## 2025-07-18 PROCEDURE — 95117 IMMUNOTHERAPY INJECTIONS: CPT | Performed by: INTERNAL MEDICINE

## 2025-07-18 NOTE — PROGRESS NOTES
Patient came into office this afternoon for administration of allergy injections x 2; see eMAR for details.  Tolerated well; left immediately following.  No 15 min OBS.      History of Present Illness    Immunotherapy Pre-Injection Questionnaire:         Are you currently pregnant or been diagnosed with a new medical condition? No    1. Have you had increased asthmas symptoms (chest tightness, increased cough, wheezing, or shortness of breath) in the past week? No    2. Have you had increase allergy symptoms (itching eyes or nose, sneezing, runny nose, post-nasal drip, or throat-clearing) in the past week? No    3. Have you had a cold, respiratory tract infection, or flu-like symptoms in the past two weeks? No    4. Did you have any problems such as increased allergy or asthma symptoms, hives, or generalized itching within 12 hours or receiving your last injection? No    5. Are you on any new medications? New eye drops? No    6. Patient confirmed their name and birth date on allergy vials are correct. Yes       Jo Bhandari RN 7/18/2025

## 2025-07-18 NOTE — TELEPHONE ENCOUNTER
PATIENT CALLED AND WANTS TO KNOW IF SHE CAN STILL HAVE SURGERY WITH DR. RAMIRES ON 07/21/25 IF DR. HORTA RELEASES OR CLEARS OR TODAY.  SHE SAID SHE IS TALKING WITH THAT OFFICE NOW.    #[phone]

## 2025-07-30 ENCOUNTER — OFFICE VISIT (OUTPATIENT)
Dept: INTERNAL MEDICINE | Facility: CLINIC | Age: 70
End: 2025-07-30
Payer: COMMERCIAL

## 2025-07-30 VITALS
BODY MASS INDEX: 29.09 KG/M2 | RESPIRATION RATE: 18 BRPM | HEART RATE: 59 BPM | SYSTOLIC BLOOD PRESSURE: 124 MMHG | WEIGHT: 181 LBS | HEIGHT: 66 IN | TEMPERATURE: 96.4 F | OXYGEN SATURATION: 97 % | DIASTOLIC BLOOD PRESSURE: 74 MMHG

## 2025-07-30 DIAGNOSIS — E78.2 MIXED HYPERLIPIDEMIA: ICD-10-CM

## 2025-07-30 DIAGNOSIS — E11.9 TYPE 2 DIABETES MELLITUS WITHOUT COMPLICATION, WITHOUT LONG-TERM CURRENT USE OF INSULIN: ICD-10-CM

## 2025-07-30 DIAGNOSIS — I10 ESSENTIAL HYPERTENSION: Primary | ICD-10-CM

## 2025-07-30 RX ORDER — AZELASTINE 1 MG/ML
2 SPRAY, METERED NASAL 2 TIMES DAILY
Qty: 30 ML | Refills: 1 | Status: SHIPPED | OUTPATIENT
Start: 2025-07-30

## 2025-07-30 RX ORDER — LEVOCETIRIZINE DIHYDROCHLORIDE 5 MG/1
5 TABLET, FILM COATED ORAL DAILY
Qty: 90 TABLET | Refills: 1 | Status: SHIPPED | OUTPATIENT
Start: 2025-07-30

## 2025-07-30 RX ORDER — COLCHICINE 0.6 MG/1
0.6 TABLET ORAL DAILY PRN
Qty: 10 TABLET | Refills: 0 | Status: SHIPPED | OUTPATIENT
Start: 2025-07-30

## 2025-07-30 RX ORDER — FLUTICASONE PROPIONATE 50 MCG
2 SPRAY, SUSPENSION (ML) NASAL DAILY
Qty: 11.1 G | Refills: 1 | Status: SHIPPED | OUTPATIENT
Start: 2025-07-30

## 2025-07-30 RX ORDER — MONTELUKAST SODIUM 10 MG/1
10 TABLET ORAL NIGHTLY
Qty: 90 TABLET | Refills: 1 | Status: SHIPPED | OUTPATIENT
Start: 2025-07-30

## 2025-07-30 NOTE — PROGRESS NOTES
"Chief Complaint  Diabetes (6 mo f/u ), Hypertension, and Hyperlipidemia      Subjective      History of Present Illness  The patient presents for a preoperative evaluation.    She was unable to undergo her scheduled cholecystectomy due to the anesthesiologist's requirement for a cardiology consultation with Dr. Taylor. A stress test in July revealed occasional PVCs. She has an appointment with Dr. Taylor tomorrow. She reports no chest pain and has never experienced it before. The stress test was ordered following an EKG and a slightly positive troponin level.    She tolerates her current medication regimen well, with no side effects such as constipation or nausea. She maintains a healthy diet, including fruits and vegetables, but avoids certain proteins due to gout. She experiences difficulty digesting peanut butter, attributed to a hiatal hernia. She engages in regular physical activity, walking nightly with a  named Daly. Her mental health is stable.    She takes colchicine as needed for gout flares, which she finds effective without causing diarrhea. She also takes allopurinol daily to prevent flares.    She uses Flonase nasal spray and montelukast, administering one in the morning and the other at night.    She recently had a fall while on vacation in [place], resulting in facial injuries and a visit to the ER.         Objective   Vital Signs:   Vitals:    07/30/25 0924   BP: 124/74   BP Location: Left arm   Patient Position: Sitting   Cuff Size: Adult   Pulse: 59   Resp: 18   Temp: 96.4 °F (35.8 °C)   TempSrc: Temporal   SpO2: 97%   Weight: 82.1 kg (181 lb)   Height: 167.6 cm (66\")     Body mass index is 29.21 kg/m².    Wt Readings from Last 3 Encounters:   07/30/25 82.1 kg (181 lb)   07/01/25 85.7 kg (188 lb 14.4 oz)   06/11/25 86.1 kg (189 lb 12.8 oz)     BP Readings from Last 3 Encounters:   07/30/25 124/74   07/01/25 137/68   06/11/25 110/68       Health Maintenance   Topic Date Due    DIABETIC FOOT " EXAM  08/01/2024    DXA SCAN  01/16/2025    COVID-19 Vaccine (4 - 2024-25 season) 08/12/2025 (Originally 9/1/2024)    INFLUENZA VACCINE  10/01/2025    HEMOGLOBIN A1C  12/11/2025    ANNUAL WELLNESS VISIT  01/31/2026    URINE MICROALBUMIN-CREATININE RATIO (uACR)  01/31/2026    MAMMOGRAM  05/16/2026    DIABETIC EYE EXAM  05/20/2026    LIPID PANEL  06/11/2026    TDAP/TD VACCINES (2 - Td or Tdap) 08/01/2026    COLORECTAL CANCER SCREENING  11/08/2027    HEPATITIS C SCREENING  Completed    Pneumococcal Vaccine 50+  Completed    ZOSTER VACCINE  Completed       Physical Exam  Vitals reviewed.   Constitutional:       Appearance: Normal appearance. She is well-developed.   HENT:      Head: Normocephalic and atraumatic.      Right Ear: External ear normal.      Left Ear: External ear normal.   Eyes:      Conjunctiva/sclera: Conjunctivae normal.      Pupils: Pupils are equal, round, and reactive to light.   Cardiovascular:      Rate and Rhythm: Normal rate and regular rhythm.      Heart sounds: No murmur heard.     No friction rub. No gallop.   Pulmonary:      Effort: Pulmonary effort is normal.      Breath sounds: Normal breath sounds. No wheezing or rhonchi.   Skin:     General: Skin is warm and dry.   Neurological:      Mental Status: She is alert and oriented to person, place, and time.   Psychiatric:         Mood and Affect: Affect normal.         Behavior: Behavior normal.         Thought Content: Thought content normal.        Physical Exam        Result Review :  The following data was reviewed by: Lisa Jones MD on 07/30/2025:         Results    Reviewed recent labs and stress test          Procedures            Assessment & Plan  Essential hypertension           Mixed hyperlipidemia            Type 2 diabetes mellitus without complication, without long-term current use of insulin                Assessment & Plan  Diabetes/hypertension/HLD  - Normotensive today  - Continues effective weight loss; recent labs,  including A1c, satisfactory  - Advised to maintain a balanced diet rich in fruits and vegetables, and monitor protein intake  - Instructed to discontinue Mounjaro 1-2 weeks prior to surgery and resume at a lower dose if discontinuation exceeds 10 to 14 days    Gout  - Advised to take colchicine as needed for gout flares and continue allopurinol daily to prevent flares  - Discussed potential need for monitoring kidney function while on colchicine    Allergic rhinitis  - Refills for Flonase nasal spray and montelukast provided  - Uses one nasal spray in the morning and the other at night    Abnormal stress test  Will await appointment with cardiology    Choledocholithiasis  Await surgery after cardiac clearance    Follow-up  - Scheduled in 3 months    Patient or patient representative verbalized consent for the use of Ambient Listening during the visit with  Lisa Jones MD for chart documentation. 7/30/2025  09:48 EDT      FOLLOW UP  Return in about 3 months (around 10/30/2025).  Patient was given instructions and counseling regarding her condition or for health maintenance advice. Please see specific information pulled into the AVS if appropriate.     Lisa Jones MD  07/30/25  10:10 EDT    CURRENT & DISCONTINUED MEDICATIONS  Current Outpatient Medications   Medication Instructions    albuterol sulfate  (90 Base) MCG/ACT inhaler 2 puffs, Every 4 Hours PRN    Allergy Relief 5 mg, Oral, Daily    Allopurinol 200 mg, Oral, Daily    azelastine (ASTELIN) 0.1 % nasal spray 2 sprays, Nasal, 2 Times Daily, Use in each nostril as directed    colchicine 0.6 mg, Oral, Daily PRN    fenofibrate (TRICOR) 145 mg, Oral, Daily    fluticasone (FLONASE) 50 MCG/ACT nasal spray 2 sprays, Nasal, Daily    furosemide (LASIX) 20 mg, Oral, Daily    montelukast (SINGULAIR) 10 mg, Oral, Nightly    olmesartan-hydrochlorothiazide (BENICAR HCT) 20-12.5 MG per tablet 1 tablet, Oral, Daily    pantoprazole (PROTONIX) 40 mg, Oral,  Daily    Pataday 0.7 % solution 1 drop, Daily PRN    potassium chloride (MICRO-K) 10 MEQ CR capsule 10 mEq, Oral, 2 Times Daily    Tirzepatide 10 mg, Subcutaneous, Weekly    VITAMIN D, ERGOCALCIFEROL, PO Daily       Medications Discontinued During This Encounter   Medication Reason    montelukast (SINGULAIR) 10 MG tablet Reorder    azelastine (ASTELIN) 0.1 % nasal spray Reorder    fluticasone (FLONASE) 50 MCG/ACT nasal spray Reorder    Allergy Relief 5 MG tablet Reorder    colchicine 0.6 MG tablet Reorder

## 2025-07-31 ENCOUNTER — OFFICE VISIT (OUTPATIENT)
Dept: CARDIOLOGY | Facility: CLINIC | Age: 70
End: 2025-07-31
Payer: COMMERCIAL

## 2025-07-31 ENCOUNTER — TELEPHONE (OUTPATIENT)
Dept: CARDIOLOGY | Facility: CLINIC | Age: 70
End: 2025-07-31

## 2025-07-31 VITALS
WEIGHT: 178 LBS | HEIGHT: 66 IN | DIASTOLIC BLOOD PRESSURE: 52 MMHG | SYSTOLIC BLOOD PRESSURE: 93 MMHG | HEART RATE: 67 BPM | BODY MASS INDEX: 28.61 KG/M2

## 2025-07-31 DIAGNOSIS — R07.2 CHEST PAIN, PRECORDIAL: Primary | ICD-10-CM

## 2025-07-31 DIAGNOSIS — I10 ESSENTIAL HYPERTENSION: ICD-10-CM

## 2025-07-31 PROCEDURE — 99204 OFFICE O/P NEW MOD 45 MIN: CPT | Performed by: INTERNAL MEDICINE

## 2025-07-31 RX ORDER — SODIUM CHLORIDE 0.9 % (FLUSH) 0.9 %
10 SYRINGE (ML) INJECTION AS NEEDED
OUTPATIENT
Start: 2025-07-31

## 2025-07-31 RX ORDER — METOPROLOL TARTRATE 50 MG
TABLET ORAL
Qty: 2 TABLET | Refills: 0 | Status: SHIPPED | OUTPATIENT
Start: 2025-07-31

## 2025-07-31 RX ORDER — METOPROLOL TARTRATE 50 MG
50 TABLET ORAL
OUTPATIENT
Start: 2025-07-31

## 2025-07-31 RX ORDER — NITROGLYCERIN 0.4 MG/1
0.4 TABLET SUBLINGUAL
OUTPATIENT
Start: 2025-07-31 | End: 2025-07-31

## 2025-07-31 RX ORDER — METOPROLOL TARTRATE 1 MG/ML
5 INJECTION, SOLUTION INTRAVENOUS
OUTPATIENT
Start: 2025-07-31

## 2025-07-31 RX ORDER — SODIUM CHLORIDE 0.9 % (FLUSH) 0.9 %
10 SYRINGE (ML) INJECTION EVERY 12 HOURS SCHEDULED
OUTPATIENT
Start: 2025-07-31

## 2025-07-31 RX ORDER — METOPROLOL TARTRATE 25 MG/1
50 TABLET, FILM COATED ORAL ONCE
OUTPATIENT
Start: 2025-07-31

## 2025-07-31 RX ORDER — METOPROLOL TARTRATE 25 MG/1
100 TABLET, FILM COATED ORAL ONCE
OUTPATIENT
Start: 2025-07-31

## 2025-07-31 RX ORDER — METOPROLOL TARTRATE 25 MG/1
200 TABLET, FILM COATED ORAL ONCE
OUTPATIENT
Start: 2025-07-31 | End: 2025-07-31

## 2025-07-31 RX ORDER — METOPROLOL TARTRATE 25 MG/1
150 TABLET, FILM COATED ORAL ONCE
OUTPATIENT
Start: 2025-07-31

## 2025-07-31 RX ORDER — SODIUM CHLORIDE 9 MG/ML
40 INJECTION, SOLUTION INTRAVENOUS AS NEEDED
OUTPATIENT
Start: 2025-07-31

## 2025-07-31 RX ORDER — NITROGLYCERIN 0.4 MG/1
0.8 TABLET SUBLINGUAL
OUTPATIENT
Start: 2025-07-31

## 2025-07-31 NOTE — TELEPHONE ENCOUNTER
Caller: India Gaytan    Relationship: Self    Best call back number: [phone]      What is the best time to reach you: ANYTIME     Who are you requesting to speak with (clinical staff, provider,  specific staff member): CLINICAL        What was the call regarding: PATIENT CALLING TO SEE IF METOPROLOL IS A BLOOD PRESSURE MEDICATION AND IF IT WILL INTERFERE WITH ANY OTHER OF HER CURRENT BLOOD PRESSURE MEDICATION.     Is it okay if the provider responds through MyChart: NO PREFERS A CALL BACK.

## 2025-07-31 NOTE — PROGRESS NOTES
Chief Complaint  Establish Care (Refer from Breana Branch. Abnormal stress test on 6/11/25), Hyperlipidemia, Hypertension, and Heart Murmur      History of Present Illness  India Gaytan presents to [facility] CARDIOLOGY  Patient is a 70-year-old female with a prior history of diabetes type 2, hypertension, and dyslipidemia who was recently seen in the emergency room in June where she developed some epigastric/chest discomfort rating to her right side at that time she was found to have evidence of gallstones and elevated LFTs consistent with cholecystitis.  She was also noted to have a mildly elevated troponin which did trend up but no acute EKG changes.  She subsequently has had a stress test which was mildly abnormal revealing some possible ischemia in the inferior territory.  She is not reporting recurrence of discomfort symptoms.  She has not had any changes in her breathing ability    Past Medical History:   Diagnosis Date    Anemia     ASYMPTOMATIC    Anesthesia     SLOW TO WAKE UP POSTOP     Benign essential hypertension     Chronic allergic rhinitis     Essential hypertension     Gout 05/30/2019    greatly improved    High cholesterol     Hyperlipidemia     Lower extremity edema 09/18/2017    cont compression stockings can do lymphedema therapy if she wishes in the future    Lumbar back pain with radiculopathy affecting right lower extremity 06/12/2015    Mixed hyperlipidemia     Osteoarthritis of right hip 06/12/2015    Pain of right hip joint 09/18/2017    has chronic arthritis of that hip with tendonitits seen previously in MRI however she doesn't wish to do pt at this time, will try stretches that she has done in the past at home currently    PONV (postoperative nausea and vomiting)     Seasonal allergies     Ventral hernia CURRENTLY         Current Outpatient Medications:     albuterol sulfate  (90 Base) MCG/ACT inhaler, Inhale 2 puffs Every 4 (Four) Hours As Needed for  Wheezing or Shortness of Air (USES FOR ALLEGERIES)., Disp: , Rfl:     Allergy Relief 5 MG tablet, Take 1 tablet by mouth Daily., Disp: 90 tablet, Rfl: 1    Allopurinol 200 MG tablet, Take 200 mg by mouth Daily., Disp: 90 tablet, Rfl: 1    azelastine (ASTELIN) 0.1 % nasal spray, Administer 2 sprays into the nostril(s) as directed by provider 2 (Two) Times a Day. Use in each nostril as directed, Disp: 30 mL, Rfl: 1    colchicine 0.6 MG tablet, Take 1 tablet by mouth Daily As Needed for Muscle / Joint Pain (gout flares)., Disp: 10 tablet, Rfl: 0    fenofibrate (TRICOR) 145 MG tablet, Take 1 tablet by mouth Daily., Disp: 90 tablet, Rfl: 1    fluticasone (FLONASE) 50 MCG/ACT nasal spray, Administer 2 sprays into the nostril(s) as directed by provider Daily., Disp: 11.1 g, Rfl: 1    furosemide (LASIX) 20 MG tablet, Take 1 tablet by mouth Daily., Disp: 90 tablet, Rfl: 1    montelukast (SINGULAIR) 10 MG tablet, Take 1 tablet by mouth Every Night., Disp: 90 tablet, Rfl: 1    olmesartan-hydrochlorothiazide (BENICAR HCT) 20-12.5 MG per tablet, Take 1 tablet by mouth Daily., Disp: 90 tablet, Rfl: 1    pantoprazole (PROTONIX) 40 MG EC tablet, Take 1 tablet by mouth Daily., Disp: 90 tablet, Rfl: 1    Pataday 0.7 % solution, Administer 1 drop to both eyes Daily As Needed. NOT USED IN A WHILE, Disp: , Rfl:     potassium chloride (MICRO-K) 10 MEQ CR capsule, Take 1 capsule by mouth 2 (Two) Times a Day. (Patient taking differently: Take 1 capsule by mouth Daily.), Disp: 180 capsule, Rfl: 1    Tirzepatide 10 MG/0.5ML solution auto-injector, Inject 10 mg under the skin into the appropriate area as directed 1 (One) Time Per Week., Disp: 6 mL, Rfl: 1    VITAMIN D, ERGOCALCIFEROL, PO, Take  by mouth Daily., Disp: , Rfl:     metoprolol tartrate (LOPRESSOR) 50 MG tablet, Take 50 mg at Bedtime the Night Before Coronary CTA Appointment and In the Morning 1 Hour Prior to Coronary CTA Appointment. Do not take if heart rate less than 60., Disp:  "2 tablet, Rfl: 0    Current Facility-Administered Medications:     patient supplied allergy injection, 0.5 mL, Subcutaneous, Q28 Days, Lisa Jones MD, 0.5 mL at 07/18/25 0808    patient supplied allergy injection, 0.5 mL, Subcutaneous, Q28 Days, Lisa Jones MD, 0.5 mL at 07/18/25 0807    There are no discontinued medications.  Allergies   Allergen Reactions    Levofloxacin Nausea And Vomiting     Pt reported also caused her to pass out         Social History     Tobacco Use    Smoking status: Never     Passive exposure: Never    Smokeless tobacco: Never   Vaping Use    Vaping status: Never Used   Substance Use Topics    Alcohol use: Yes     Comment: Rarely drinks    Drug use: Never       Family History   Problem Relation Age of Onset    Heart disease Mother     Heart disease Father     Colon cancer Neg Hx     Malig Hyperthermia Neg Hx         Objective     BP 93/52   Pulse 67   Ht 167.6 cm (65.98\")   Wt 80.7 kg (178 lb)   BMI 28.74 kg/m²       Physical Exam    General Appearance:   no acute distress  Alert and oriented x3  HENT:   lips not cyanotic  Atraumatic  Neck:  No jvd   supple  Respiratory:  no respiratory distress  normal breath sounds  no rales  Cardiovascular:  Regular rate and rhythm  no S3, no S4   no murmur  no rub  Extremities  No cyanosis  lower extremity edema: none    Skin:   warm, dry  No rashes    Result Review :     No results found for: \"PROBNP\"  CMP          1/31/2025    10:23 6/4/2025    11:04 6/11/2025    15:02   CMP   Glucose 77  126  106    BUN 29  31.4  37.0    Creatinine 1.27  1.02  1.31    EGFR 46.4  60.4  44.7    Sodium 141  139  142    Potassium 4.1  3.9  3.9    Chloride 104  102  104    Calcium 9.9  9.8  10.7    Total Protein 7.2  7.7  7.5    Albumin 3.8  4.3  4.1    Globulin 3.4  3.4  3.4    Total Bilirubin <0.2  0.6  0.3    Alkaline Phosphatase 85  83  95    AST (SGOT) 28  92  28    ALT (SGPT) 20  47  43    Albumin/Globulin Ratio 1.1  1.3  1.2    BUN/Creatinine " "Ratio 22.8  30.8  28.2    Anion Gap 11.9  13.6  11.8      CBC w/diff          1/31/2025    10:23 6/4/2025    11:09 6/11/2025    15:02   CBC w/Diff   WBC 7.63  11.42  8.86    RBC 4.25  4.32  4.02    Hemoglobin 12.1  12.5  11.9    Hematocrit 38.8  38.7  36.7    MCV 91.3  89.6  91.3    MCH 28.5  28.9  29.6    MCHC 31.2  32.3  32.4    RDW 13.1  14.9  14.3    Platelets 406  434  421    Neutrophil Rel % 54.6  88.9  64.1    Immature Granulocyte Rel % 0.1  0.4  0.3    Lymphocyte Rel % 33.9  8.5  25.2    Monocyte Rel % 8.3  1.8  7.3    Eosinophil Rel % 2.4  0.1  2.4    Basophil Rel % 0.7  0.3  0.7       Lab Results   Component Value Date    TSH 2.100 06/11/2025      Lab Results   Component Value Date    FREET4 1.7 03/26/2020      No results found for: \"DDIMERQUANT\"  Magnesium   Date Value Ref Range Status   03/25/2020 2.18 1.60 - 2.30 mg/dL Final      No results found for: \"DIGOXIN\"   Lab Results   Component Value Date    TROPONINT 23 (H) 06/04/2025      No results found for: \"POCTROP\"(       Lipid Panel          1/31/2025    10:23 6/11/2025    15:02   Lipid Panel   Total Cholesterol 141  144    Triglycerides 116  134    HDL Cholesterol 50  48    VLDL Cholesterol 21  24    LDL Cholesterol  70  72    LDL/HDL Ratio 1.36  1.44             No results found for this or any previous visit.     Results for orders placed during the hospital encounter of 07/02/25    Stress test with myocardial perfusion 07/02/2025  2:15 PM    Interpretation Summary    Left ventricular ejection fraction is normal (Calculated EF = 57%).    Stress electrocardiogram showed wandering baseline.  There was no obvious significant ischemic changes.  She did have occasional PVC at rest.  She had occasional PVC, couplet and triplet with stress.    Myocardial perfusion images shows a possible inferoapical reversible defect.  There is increased gut uptake noted.    Feel this represents an abnormal treadmill Cardiolite with possible inferoapical ischemia although " this can be due to increased gut uptake of radioisotope.  She had occasional PVC at rest.  On the treadmill she had occasional PVC, couplet and triplet.        Results for orders placed during the hospital encounter of 11/04/22    Duplex Venous Lower Extremity - Right CV-READ 11/04/2022  7:41 PM    Interpretation Summary    Normal right lower extremity venous duplex scan.    The 10-year ASCVD risk score (Ray ABREU, et al., 2019) is: 9.5%    Values used to calculate the score:      Age: 70 years      Sex: Female      Is Non- : No      Diabetic: Yes      Tobacco smoker: No      Systolic Blood Pressure: 93 mmHg      Is BP treated: Yes      HDL Cholesterol: 48 mg/dL      Total Cholesterol: 144 mg/dL     Diagnoses and all orders for this visit:    1. Chest pain, precordial (Primary)  Assessment & Plan:  Patient with a episode of chest/epigastric pain suspect most likely related to gallbladder disease however she did have a mildly elevated uptrending troponin level along with a recent stress test with some possible inferior apical ischemic changes with a good exercise capacity and no anginal symptoms.  Recommended proceeding with CT angiogram to help with exclude occlusive disease prior to surgery along with would favor preventatively aspirin 81 mg once a day dosing given her current risk assessment score which can be held prior to surgery 5 to 7 days    Orders:  -     CT Angiogram Coronary; Future  -     CT Angiogram Coronary-Cardiology Interpretation; Future  -     metoprolol tartrate (LOPRESSOR) tablet 200 mg  -     metoprolol tartrate (LOPRESSOR) tablet 150 mg  -     metoprolol tartrate (LOPRESSOR) tablet 100 mg  -     metoprolol tartrate (LOPRESSOR) tablet 50 mg  -     metoprolol tartrate (LOPRESSOR) tablet 50 mg  -     metoprolol tartrate (LOPRESSOR) injection 5 mg  -     nitroglycerin (NITROSTAT) SL tablet 0.4 mg  -     nitroglycerin (NITROSTAT) SL tablet 0.8 mg  -     No Caffeine or  Nicotine 4 Hours Prior to CTA Appointment  -     Nothing to Eat or Drink 4 Hours Prior to CTA Appointment  -     Do Not Take Phosphodiasterase Inhibitors in the 72 Hours Prior to Coronary CTA  -     Obtain Informed Consent - Computed Tomography Angiography of Chest - Angiogram of Coronary Arteries; Standing  -     Vital Signs Upon Arrival; Standing  -     Cardiac Monitoring; Standing  -     Verify NPO Status - Patient to Be NPO at Least 4 Hours Prior to CTA; Standing  -     Notify CT After Administration of metoprolol tartrate (LOPRESSOR) tablet; Standing  -     Notify Provider If Total Metoprolol Given Equals 300mg & Heart Rate Not At Goal; Standing  -     Notify Provider Prior to Administration of Nitroglycerin if Patient SBP <80; Standing  -     POC Creatinine; Standing  -     Insert Peripheral IV; Standing  -     Saline Lock & Maintain IV Access; Standing  -     sodium chloride 0.9 % flush 10 mL  -     sodium chloride 0.9 % flush 10 mL  -     sodium chloride 0.9 % infusion 40 mL  -     Vital Signs - See Instructions; Standing  -     Hold Medication Metformin (Glucophage, Glucophage XR, Fortament, Glumetza);  Metglip (metformin/glipizide);  Glucovance (metformin/glyburide); Avandamet (metformin/rosiglitazone); Standing  -     Patient May Discharge Home After Procedure Complete (If Stable); Standing  -     metoprolol tartrate (LOPRESSOR) 50 MG tablet; Take 50 mg at Bedtime the Night Before Coronary CTA Appointment and In the Morning 1 Hour Prior to Coronary CTA Appointment. Do not take if heart rate less than 60.  Dispense: 2 tablet; Refill: 0    2. Essential hypertension  Assessment & Plan:                Follow Up     No follow-ups on file.          Patient was given instructions and counseling regarding her condition or for health maintenance advice. Please see specific information pulled into the AVS if appropriate.

## 2025-07-31 NOTE — ASSESSMENT & PLAN NOTE
Patient with a episode of chest/epigastric pain suspect most likely related to gallbladder disease however she did have a mildly elevated uptrending troponin level along with a recent stress test with some possible inferior apical ischemic changes with a good exercise capacity and no anginal symptoms.  Recommended proceeding with CT angiogram to help with exclude occlusive disease prior to surgery along with would favor preventatively aspirin 81 mg once a day dosing given her current risk assessment score which can be held prior to surgery 5 to 7 days

## 2025-08-01 NOTE — TELEPHONE ENCOUNTER
SW patient Advised metoprolol is to make her HR low prior to CTA. Advised okay with other medications

## 2025-08-05 DIAGNOSIS — R07.2 CHEST PAIN, PRECORDIAL: ICD-10-CM

## 2025-08-05 RX ORDER — METOPROLOL TARTRATE 50 MG
TABLET ORAL
Qty: 2 TABLET | Refills: 0 | Status: SHIPPED | OUTPATIENT
Start: 2025-08-05

## 2025-08-11 ENCOUNTER — RESULTS FOLLOW-UP (OUTPATIENT)
Dept: CARDIOLOGY | Facility: CLINIC | Age: 70
End: 2025-08-11
Payer: COMMERCIAL

## 2025-08-11 DIAGNOSIS — R07.2 CHEST PAIN, PRECORDIAL: ICD-10-CM

## 2025-08-12 ENCOUNTER — TELEPHONE (OUTPATIENT)
Dept: CARDIOLOGY | Facility: CLINIC | Age: 70
End: 2025-08-12
Payer: COMMERCIAL

## 2025-08-12 RX ORDER — ASPIRIN 81 MG/1
81 TABLET ORAL DAILY
Start: 2025-08-12

## 2025-08-29 ENCOUNTER — CLINICAL SUPPORT (OUTPATIENT)
Dept: INTERNAL MEDICINE | Facility: CLINIC | Age: 70
End: 2025-08-29
Payer: MEDICARE

## 2025-08-29 DIAGNOSIS — J30.89 ENVIRONMENTAL AND SEASONAL ALLERGIES: Primary | ICD-10-CM

## 2025-08-29 PROCEDURE — 95117 IMMUNOTHERAPY INJECTIONS: CPT | Performed by: INTERNAL MEDICINE

## (undated) DEVICE — GLOVE,SURG,SENSICARE SLT,LF,PF,6.5: Brand: MEDLINE

## (undated) DEVICE — SOL IRR NACL 0.9PCT BT 1000ML

## (undated) DEVICE — ANTIBACTERIAL UNDYED BRAIDED (POLYGLACTIN 910), SYNTHETIC ABSORBABLE SUTURE: Brand: COATED VICRYL

## (undated) DEVICE — GLOVE,SURG,SENSICARE SLT,LF,PF,7.5: Brand: MEDLINE

## (undated) DEVICE — INTENDED FOR TISSUE SEPARATION, AND OTHER PROCEDURES THAT REQUIRE A SHARP SURGICAL BLADE TO PUNCTURE OR CUT.: Brand: BARD-PARKER ® CARBON RIB-BACK BLADES

## (undated) DEVICE — SINGLE-USE BIOPSY FORCEPS: Brand: RADIAL JAW 4

## (undated) DEVICE — BLCK/BITE BLOX WO/DENTL/RIM W/STRAP 54F

## (undated) DEVICE — CONN JET HYDRA H20 AUXILIARY DISP

## (undated) DEVICE — PENCL SMOKE/EVAC MEGADYNE TELESCP 10FT

## (undated) DEVICE — TROCAR: Brand: KII FIOS FIRST ENTRY

## (undated) DEVICE — APPL CHLORAPREP HI/LITE 26ML ORNG

## (undated) DEVICE — GLV SURG SENSICARE SLT PF LF 6.5 STRL

## (undated) DEVICE — GOWN,REINFRCE,POLY,SIRUS,BREATH SLV,XXLG: Brand: MEDLINE

## (undated) DEVICE — SOL ANTISTICK CAUTRY ELECTROLUBE LF

## (undated) DEVICE — SUT VIC 3/0 SH 27IN J416H

## (undated) DEVICE — ADHS SKIN SURG TISS VISC PREMIERPRO EXOFIN HI/VISC FAST/DRY

## (undated) DEVICE — LINER SURG CANSTR SXN S/RIGD 1500CC

## (undated) DEVICE — SOL IRRG H2O PL/BG 1000ML STRL

## (undated) DEVICE — 3 RING SUTURE PASSER - 16 CM: Brand: 3 RING SUTURE PASSER - 16 CM

## (undated) DEVICE — GLV SURG SENSICARE SLT PF LF 7.5 STRL

## (undated) DEVICE — TIP COVER ACCESSORY

## (undated) DEVICE — ARM DRAPE

## (undated) DEVICE — MAJOR-LF: Brand: MEDLINE INDUSTRIES, INC.

## (undated) DEVICE — ENDOPATH PNEUMONEEDLE INSUFFLATION NEEDLES WITH LUER LOCK CONNECTORS 120MM: Brand: ENDOPATH

## (undated) DEVICE — SOLIDIFIER LIQLOC PLS 1500CC BT

## (undated) DEVICE — CANNULA SEAL

## (undated) DEVICE — PENCL E/S SMOKEEVAC W/TELESCP CANN

## (undated) DEVICE — STERILE POLYISOPRENE POWDER-FREE SURGICAL GLOVES WITH EMOLLIENT COATING: Brand: PROTEXIS

## (undated) DEVICE — Device: Brand: DEFENDO AIR/WATER/SUCTION AND BIOPSY VALVE

## (undated) DEVICE — Device

## (undated) DEVICE — SLV SCD LEG COMFORT KENDALLSCD MD REPROC

## (undated) DEVICE — DAVINCI-LF: Brand: MEDLINE INDUSTRIES, INC.

## (undated) DEVICE — VISUALIZATION SYSTEM: Brand: CLEARIFY

## (undated) DEVICE — SUT VIC PLS CTD BR 0 TIE 18IN VIL